# Patient Record
Sex: MALE | Race: WHITE | NOT HISPANIC OR LATINO | Employment: OTHER | URBAN - METROPOLITAN AREA
[De-identification: names, ages, dates, MRNs, and addresses within clinical notes are randomized per-mention and may not be internally consistent; named-entity substitution may affect disease eponyms.]

---

## 2019-03-05 ENCOUNTER — HOSPITAL ENCOUNTER (OUTPATIENT)
Dept: LAB | Facility: HOSPITAL | Age: 84
Discharge: HOME | End: 2019-03-05
Attending: INTERNAL MEDICINE

## 2019-03-05 LAB
ANION GAP SERPL CALC-SCNC: 21 MMOL/L (ref 8–19)
BUN SERPL-MCNC: 30 MG/DL (ref 5–25)
BUN/CREAT SERPL: 17 {RATIO} (ref 6–20)
CALCIUM SERPL-MCNC: 8.9 MG/DL (ref 8.7–10.4)
CHLORIDE SERPL-SCNC: 99 MMOL/L (ref 99–111)
CONV CO2: 21 MMOL/L (ref 22–32)
CREAT UR-MCNC: 1.77 MG/DL (ref 0.7–1.2)
EST. AVERAGE GLUCOSE BLD GHB EST-MCNC: 209 MG/DL
GFR SERPLBLD BASED ON 1.73 SQ M-ARVRAT: 34 ML/MIN/{1.73_M2}
GLUCOSE SERPL-MCNC: 332 MG/DL (ref 70–99)
HBA1C MFR BLD: 8.9 % (ref 3.5–5.7)
OSMOLALITY SERPL CALC.SUM OF ELEC: 301 MOSM/KG (ref 273–304)
POTASSIUM SERPL-SCNC: 4.5 MMOL/L (ref 3.5–5.3)
SODIUM SERPL-SCNC: 136 MMOL/L (ref 135–147)

## 2019-04-15 ENCOUNTER — HOSPITAL ENCOUNTER (OUTPATIENT)
Dept: LAB | Facility: HOSPITAL | Age: 84
Discharge: HOME | End: 2019-04-15
Attending: INTERNAL MEDICINE

## 2019-04-15 LAB
ANION GAP SERPL CALC-SCNC: 22 MMOL/L (ref 8–19)
BUN SERPL-MCNC: 27 MG/DL (ref 5–25)
BUN/CREAT SERPL: 15 {RATIO} (ref 6–20)
CALCIUM SERPL-MCNC: 9.1 MG/DL (ref 8.7–10.4)
CHLORIDE SERPL-SCNC: 100 MMOL/L (ref 99–111)
CONV CO2: 21 MMOL/L (ref 22–32)
CREAT UR-MCNC: 1.77 MG/DL (ref 0.7–1.2)
EST. AVERAGE GLUCOSE BLD GHB EST-MCNC: 200 MG/DL
GFR SERPLBLD BASED ON 1.73 SQ M-ARVRAT: 34 ML/MIN/{1.73_M2}
GLUCOSE SERPL-MCNC: 301 MG/DL (ref 70–99)
HBA1C MFR BLD: 8.6 % (ref 3.5–5.7)
OSMOLALITY SERPL CALC.SUM OF ELEC: 302 MOSM/KG (ref 273–304)
POTASSIUM SERPL-SCNC: 4.8 MMOL/L (ref 3.5–5.3)
SODIUM SERPL-SCNC: 138 MMOL/L (ref 135–147)

## 2019-04-16 LAB — FRUCTOSAMINE SERPL-SCNC: 270 UMOL/L (ref 0–285)

## 2019-05-06 ENCOUNTER — HOSPITAL ENCOUNTER (OUTPATIENT)
Dept: LAB | Facility: HOSPITAL | Age: 84
Discharge: HOME | End: 2019-05-06
Attending: INTERNAL MEDICINE

## 2019-05-06 LAB
ALBUMIN SERPL-MCNC: 4 G/DL (ref 3.5–5)
ALBUMIN/GLOB SERPL: 1.2 {RATIO} (ref 1.4–2.6)
ALP SERPL-CCNC: 76 U/L (ref 56–155)
ALT SERPL-CCNC: 17 U/L (ref 10–40)
ANION GAP SERPL CALC-SCNC: 23 MMOL/L (ref 8–19)
AST SERPL-CCNC: 40 U/L (ref 15–50)
BILIRUB SERPL-MCNC: 0.52 MG/DL (ref 0.2–1.3)
BUN SERPL-MCNC: 26 MG/DL (ref 5–25)
BUN/CREAT SERPL: 15 {RATIO} (ref 6–20)
CALCIUM SERPL-MCNC: 8.9 MG/DL (ref 8.7–10.4)
CHLORIDE SERPL-SCNC: 105 MMOL/L (ref 99–111)
CHOLEST SERPL-MCNC: 143 MG/DL (ref 107–200)
CHOLEST/HDLC SERPL: 3.3 {RATIO} (ref 3–6)
CONV CO2: 18 MMOL/L (ref 22–32)
CONV TOTAL PROTEIN: 7.3 G/DL (ref 6.3–8.2)
CREAT UR-MCNC: 1.68 MG/DL (ref 0.7–1.2)
GFR SERPLBLD BASED ON 1.73 SQ M-ARVRAT: 37 ML/MIN/{1.73_M2}
GLOBULIN UR ELPH-MCNC: 3.3 G/DL (ref 2–3.5)
GLUCOSE SERPL-MCNC: 201 MG/DL (ref 70–99)
HDLC SERPL-MCNC: 43 MG/DL (ref 40–60)
LDLC SERPL CALC-MCNC: 33 MG/DL (ref 70–100)
OSMOLALITY SERPL CALC.SUM OF ELEC: 302 MOSM/KG (ref 273–304)
POTASSIUM SERPL-SCNC: 4.6 MMOL/L (ref 3.5–5.3)
SODIUM SERPL-SCNC: 141 MMOL/L (ref 135–147)
TRIGL SERPL-MCNC: 335 MG/DL (ref 40–150)
VLDLC SERPL-MCNC: 67 MG/DL (ref 5–37)

## 2019-06-03 ENCOUNTER — HOSPITAL ENCOUNTER (OUTPATIENT)
Dept: LAB | Facility: HOSPITAL | Age: 84
Discharge: HOME | End: 2019-06-03
Attending: INTERNAL MEDICINE

## 2019-06-03 LAB
25(OH)D3 SERPL-MCNC: 26.7 NG/ML (ref 30–100)
ALBUMIN SERPL-MCNC: 3.9 G/DL (ref 3.5–5)
ANION GAP SERPL CALC-SCNC: 21 MMOL/L (ref 8–19)
ANION GAP SERPL CALC-SCNC: 24 MMOL/L (ref 8–19)
BASOPHILS # BLD AUTO: 0.07 10*3/UL (ref 0–0.2)
BASOPHILS NFR BLD AUTO: 0.9 % (ref 0–3)
BUN SERPL-MCNC: 27 MG/DL (ref 5–25)
BUN SERPL-MCNC: 27 MG/DL (ref 5–25)
BUN/CREAT SERPL: 14 {RATIO} (ref 6–20)
BUN/CREAT SERPL: 15 {RATIO} (ref 6–20)
CALCIUM SERPL-MCNC: 8.5 MG/DL (ref 8.7–10.4)
CALCIUM SERPL-MCNC: 8.6 MG/DL (ref 8.7–10.4)
CHLORIDE SERPL-SCNC: 104 MMOL/L (ref 99–111)
CHLORIDE SERPL-SCNC: 106 MMOL/L (ref 99–111)
CONV ABS IMM GRAN: 0.02 10*3/UL (ref 0–0.2)
CONV CO2: 16 MMOL/L (ref 22–32)
CONV CO2: 18 MMOL/L (ref 22–32)
CONV IMMATURE GRAN: 0.3 % (ref 0–1.8)
CREAT UR-MCNC: 1.85 MG/DL (ref 0.7–1.2)
CREAT UR-MCNC: 1.88 MG/DL (ref 0.7–1.2)
DEPRECATED RDW RBC AUTO: 52.1 FL (ref 35.1–43.9)
EOSINOPHIL # BLD AUTO: 0.35 10*3/UL (ref 0–0.7)
EOSINOPHIL # BLD AUTO: 4.7 % (ref 0–7)
ERYTHROCYTE [DISTWIDTH] IN BLOOD BY AUTOMATED COUNT: 15.4 % (ref 11.6–14.4)
EST. AVERAGE GLUCOSE BLD GHB EST-MCNC: 180 MG/DL
GFR SERPLBLD BASED ON 1.73 SQ M-ARVRAT: 32 ML/MIN/{1.73_M2}
GFR SERPLBLD BASED ON 1.73 SQ M-ARVRAT: 33 ML/MIN/{1.73_M2}
GLUCOSE SERPL-MCNC: 304 MG/DL (ref 70–99)
GLUCOSE SERPL-MCNC: 307 MG/DL (ref 70–99)
HBA1C MFR BLD: 11.6 G/DL (ref 14–18)
HBA1C MFR BLD: 7.9 % (ref 3.5–5.7)
HCT VFR BLD AUTO: 37.7 % (ref 42–52)
LYMPHOCYTES # BLD AUTO: 2.56 10*3/UL (ref 1–5)
MCH RBC QN AUTO: 28.4 PG (ref 27–31)
MCHC RBC AUTO-ENTMCNC: 30.8 G/DL (ref 33–37)
MCV RBC AUTO: 92.2 FL (ref 80–96)
MONOCYTES # BLD AUTO: 0.57 10*3/UL (ref 0.2–1.2)
MONOCYTES NFR BLD AUTO: 7.6 % (ref 3–10)
NEUTROPHILS # BLD AUTO: 3.91 10*3/UL (ref 2–8)
NEUTROPHILS NFR BLD AUTO: 52.3 % (ref 30–85)
NRBC CBCN: 0 % (ref 0–0.7)
OSMOLALITY SERPL CALC.SUM OF ELEC: 303 MOSM/KG (ref 273–304)
PHOSPHATE SERPL-MCNC: 3.1 MG/DL (ref 2.4–4.5)
PLATELET # BLD AUTO: 234 10*3/UL (ref 130–400)
PMV BLD AUTO: 9.8 FL (ref 9.4–12.4)
POTASSIUM SERPL-SCNC: 5 MMOL/L (ref 3.5–5.3)
POTASSIUM SERPL-SCNC: 5.1 MMOL/L (ref 3.5–5.3)
RBC # BLD AUTO: 4.09 10*6/UL (ref 4.7–6.1)
SODIUM SERPL-SCNC: 138 MMOL/L (ref 135–147)
SODIUM SERPL-SCNC: 141 MMOL/L (ref 135–147)
VARIANT LYMPHS NFR BLD MANUAL: 34.2 % (ref 20–45)
WBC # BLD AUTO: 7.48 10*3/UL (ref 4.8–10.8)

## 2019-06-04 LAB — FRUCTOSAMINE SERPL-SCNC: 251 UMOL/L (ref 0–285)

## 2019-09-10 ENCOUNTER — HOSPITAL ENCOUNTER (OUTPATIENT)
Dept: LAB | Facility: HOSPITAL | Age: 84
Discharge: HOME | End: 2019-09-10
Attending: INTERNAL MEDICINE

## 2019-09-10 LAB
ANION GAP SERPL CALC-SCNC: 28 MMOL/L (ref 8–19)
BASOPHILS # BLD AUTO: 0.05 10*3/UL (ref 0–0.2)
BASOPHILS NFR BLD AUTO: 0.6 % (ref 0–3)
BUN SERPL-MCNC: 28 MG/DL (ref 5–25)
BUN/CREAT SERPL: 16 {RATIO} (ref 6–20)
CALCIUM SERPL-MCNC: 9.6 MG/DL (ref 8.7–10.4)
CHLORIDE SERPL-SCNC: 110 MMOL/L (ref 99–111)
CHOLEST SERPL-MCNC: 146 MG/DL (ref 107–200)
CHOLEST/HDLC SERPL: 3.6 {RATIO} (ref 3–6)
CONV ABS IMM GRAN: 0.04 10*3/UL (ref 0–0.2)
CONV CO2: 15 MMOL/L (ref 22–32)
CONV IMMATURE GRAN: 0.5 % (ref 0–1.8)
CREAT UR-MCNC: 1.76 MG/DL (ref 0.7–1.2)
DEPRECATED RDW RBC AUTO: 47.5 FL (ref 35.1–43.9)
EOSINOPHIL # BLD AUTO: 0.43 10*3/UL (ref 0–0.7)
EOSINOPHIL # BLD AUTO: 5.5 % (ref 0–7)
ERYTHROCYTE [DISTWIDTH] IN BLOOD BY AUTOMATED COUNT: 14.5 % (ref 11.6–14.4)
EST. AVERAGE GLUCOSE BLD GHB EST-MCNC: 186 MG/DL
FERRITIN SERPL-MCNC: 168 NG/ML (ref 30–300)
GFR SERPLBLD BASED ON 1.73 SQ M-ARVRAT: 35 ML/MIN/{1.73_M2}
GLUCOSE SERPL-MCNC: 169 MG/DL (ref 70–99)
HBA1C MFR BLD: 8.1 % (ref 3.5–5.7)
HCT VFR BLD AUTO: 39.3 % (ref 42–52)
HDLC SERPL-MCNC: 41 MG/DL (ref 40–60)
HGB BLD-MCNC: 12.5 G/DL (ref 14–18)
IRON SATN MFR SERPL: 19 % (ref 20–55)
IRON SERPL-MCNC: 77 UG/DL (ref 70–180)
LDLC SERPL CALC-MCNC: 38 MG/DL (ref 70–100)
LYMPHOCYTES # BLD AUTO: 3.1 10*3/UL (ref 1–5)
LYMPHOCYTES NFR BLD AUTO: 39.4 % (ref 20–45)
MCH RBC QN AUTO: 28.8 PG (ref 27–31)
MCHC RBC AUTO-ENTMCNC: 31.8 G/DL (ref 33–37)
MCV RBC AUTO: 90.6 FL (ref 80–96)
MONOCYTES # BLD AUTO: 0.6 10*3/UL (ref 0.2–1.2)
MONOCYTES NFR BLD AUTO: 7.6 % (ref 3–10)
NEUTROPHILS # BLD AUTO: 3.65 10*3/UL (ref 2–8)
NEUTROPHILS NFR BLD AUTO: 46.4 % (ref 30–85)
NRBC CBCN: 0 % (ref 0–0.7)
OSMOLALITY SERPL CALC.SUM OF ELEC: 315 MOSM/KG (ref 273–304)
PLATELET # BLD AUTO: 246 10*3/UL (ref 130–400)
PMV BLD AUTO: 9.4 FL (ref 9.4–12.4)
POTASSIUM SERPL-SCNC: 4.8 MMOL/L (ref 3.5–5.3)
RBC # BLD AUTO: 4.34 10*6/UL (ref 4.7–6.1)
SODIUM SERPL-SCNC: 148 MMOL/L (ref 135–147)
TIBC SERPL-MCNC: 399 UG/DL (ref 245–450)
TRANSFERRIN SERPL-MCNC: 279 MG/DL (ref 215–365)
TRIGL SERPL-MCNC: 335 MG/DL (ref 40–150)
VLDLC SERPL-MCNC: 67 MG/DL (ref 5–37)
WBC # BLD AUTO: 7.87 10*3/UL (ref 4.8–10.8)

## 2019-12-03 ENCOUNTER — HOSPITAL ENCOUNTER (OUTPATIENT)
Dept: LAB | Facility: HOSPITAL | Age: 84
Discharge: HOME | End: 2019-12-03
Attending: INTERNAL MEDICINE

## 2019-12-03 LAB
25(OH)D3 SERPL-MCNC: 28.4 NG/ML (ref 30–100)
ALBUMIN SERPL-MCNC: 3.7 G/DL (ref 3.5–5)
ANION GAP SERPL CALC-SCNC: 18 MMOL/L (ref 8–19)
ANION GAP SERPL CALC-SCNC: 21 MMOL/L (ref 8–19)
APPEARANCE UR: CLEAR
BASOPHILS # BLD AUTO: 0.05 10*3/UL (ref 0–0.2)
BASOPHILS NFR BLD AUTO: 0.6 % (ref 0–3)
BILIRUB UR QL: NEGATIVE
BUN SERPL-MCNC: 28 MG/DL (ref 5–25)
BUN SERPL-MCNC: 28 MG/DL (ref 5–25)
BUN/CREAT SERPL: 15 {RATIO} (ref 6–20)
BUN/CREAT SERPL: 17 {RATIO} (ref 6–20)
CALCIUM SERPL-MCNC: 9.1 MG/DL (ref 8.7–10.4)
CALCIUM SERPL-MCNC: 9.4 MG/DL (ref 8.7–10.4)
CHLORIDE SERPL-SCNC: 102 MMOL/L (ref 99–111)
CHLORIDE SERPL-SCNC: 102 MMOL/L (ref 99–111)
COLOR UR: YELLOW
CONV ABS IMM GRAN: 0.04 10*3/UL (ref 0–0.2)
CONV CO2: 21 MMOL/L (ref 22–32)
CONV CO2: 21 MMOL/L (ref 22–32)
CONV COLLECTION SOURCE (UA): ABNORMAL
CONV CREATININE URINE, RANDOM: 247.3 MG/DL (ref 10–300)
CONV IMMATURE GRAN: 0.5 % (ref 0–1.8)
CONV UROBILINOGEN IN URINE BY AUTOMATED TEST STRIP: 0.2 {EHRLICHU}/DL (ref 0.1–1)
CREAT UR-MCNC: 1.64 MG/DL (ref 0.7–1.2)
CREAT UR-MCNC: 1.85 MG/DL (ref 0.7–1.2)
DEPRECATED RDW RBC AUTO: 47.2 FL (ref 35.1–43.9)
EOSINOPHIL # BLD AUTO: 0.4 10*3/UL (ref 0–0.7)
EOSINOPHIL # BLD AUTO: 4.8 % (ref 0–7)
ERYTHROCYTE [DISTWIDTH] IN BLOOD BY AUTOMATED COUNT: 14.5 % (ref 11.6–14.4)
EST. AVERAGE GLUCOSE BLD GHB EST-MCNC: 212 MG/DL
GFR SERPLBLD BASED ON 1.73 SQ M-ARVRAT: 33 ML/MIN/{1.73_M2}
GFR SERPLBLD BASED ON 1.73 SQ M-ARVRAT: 38 ML/MIN/{1.73_M2}
GLUCOSE SERPL-MCNC: 241 MG/DL (ref 70–99)
GLUCOSE SERPL-MCNC: 243 MG/DL (ref 70–99)
GLUCOSE UR QL: 100 MG/DL
HBA1C MFR BLD: 9 % (ref 3.5–5.7)
HCT VFR BLD AUTO: 38.7 % (ref 42–52)
HGB BLD-MCNC: 12.2 G/DL (ref 14–18)
HGB UR QL STRIP: NEGATIVE
KETONES UR QL STRIP: ABNORMAL MG/DL
LEUKOCYTE ESTERASE UR QL STRIP: NEGATIVE
LYMPHOCYTES # BLD AUTO: 2.11 10*3/UL (ref 1–5)
LYMPHOCYTES NFR BLD AUTO: 25.5 % (ref 20–45)
MCH RBC QN AUTO: 28.3 PG (ref 27–31)
MCHC RBC AUTO-ENTMCNC: 31.5 G/DL (ref 33–37)
MCV RBC AUTO: 89.8 FL (ref 80–96)
MONOCYTES # BLD AUTO: 0.54 10*3/UL (ref 0.2–1.2)
MONOCYTES NFR BLD AUTO: 6.5 % (ref 3–10)
NEUTROPHILS # BLD AUTO: 5.15 10*3/UL (ref 2–8)
NEUTROPHILS NFR BLD AUTO: 62.1 % (ref 30–85)
NITRITE UR QL STRIP: NEGATIVE
NRBC CBCN: 0 % (ref 0–0.7)
OSMOLALITY SERPL CALC.SUM OF ELEC: 297 MOSM/KG (ref 273–304)
PH UR STRIP.AUTO: 5 [PH] (ref 5–8)
PHOSPHATE SERPL-MCNC: 3.7 MG/DL (ref 2.4–4.5)
PLATELET # BLD AUTO: 242 10*3/UL (ref 130–400)
PMV BLD AUTO: 9.5 FL (ref 9.4–12.4)
POTASSIUM SERPL-SCNC: 4.4 MMOL/L (ref 3.5–5.3)
POTASSIUM SERPL-SCNC: 4.5 MMOL/L (ref 3.5–5.3)
PROT UR QL: ABNORMAL MG/DL
PROT UR-MCNC: 21.6 MG/DL
PTH-INTACT SERPL-MCNC: 60 PG/ML (ref 11.1–79.5)
RBC # BLD AUTO: 4.31 10*6/UL (ref 4.7–6.1)
SODIUM SERPL-SCNC: 137 MMOL/L (ref 135–147)
SODIUM SERPL-SCNC: 139 MMOL/L (ref 135–147)
SP GR UR: 1.02 (ref 1–1.03)
WBC # BLD AUTO: 8.29 10*3/UL (ref 4.8–10.8)

## 2019-12-12 ENCOUNTER — HOSPITAL ENCOUNTER (OUTPATIENT)
Dept: GENERAL RADIOLOGY | Facility: HOSPITAL | Age: 84
Discharge: HOME | End: 2019-12-12
Attending: INTERNAL MEDICINE

## 2020-01-06 ENCOUNTER — HOSPITAL ENCOUNTER (OUTPATIENT)
Dept: LAB | Facility: HOSPITAL | Age: 85
Discharge: HOME | End: 2020-01-06
Attending: INTERNAL MEDICINE

## 2020-01-06 LAB
ALBUMIN SERPL-MCNC: 4 G/DL (ref 3.5–5)
ALBUMIN/GLOB SERPL: 1.2 {RATIO} (ref 1.4–2.6)
ALP SERPL-CCNC: 82 U/L (ref 56–155)
ALT SERPL-CCNC: 13 U/L (ref 10–40)
ANION GAP SERPL CALC-SCNC: 19 MMOL/L (ref 8–19)
AST SERPL-CCNC: 23 U/L (ref 15–50)
BILIRUB SERPL-MCNC: 0.33 MG/DL (ref 0.2–1.3)
BUN SERPL-MCNC: 25 MG/DL (ref 5–25)
BUN/CREAT SERPL: 17 {RATIO} (ref 6–20)
CALCIUM SERPL-MCNC: 9.2 MG/DL (ref 8.7–10.4)
CHLORIDE SERPL-SCNC: 109 MMOL/L (ref 99–111)
CHOLEST SERPL-MCNC: 153 MG/DL (ref 107–200)
CHOLEST/HDLC SERPL: 3.4 {RATIO} (ref 3–6)
CONV CO2: 17 MMOL/L (ref 22–32)
CONV TOTAL PROTEIN: 7.3 G/DL (ref 6.3–8.2)
CREAT UR-MCNC: 1.51 MG/DL (ref 0.7–1.2)
GFR SERPLBLD BASED ON 1.73 SQ M-ARVRAT: 42 ML/MIN/{1.73_M2}
GLOBULIN UR ELPH-MCNC: 3.3 G/DL (ref 2–3.5)
GLUCOSE SERPL-MCNC: 85 MG/DL (ref 70–99)
HDLC SERPL-MCNC: 45 MG/DL (ref 40–60)
LDLC SERPL CALC-MCNC: 57 MG/DL (ref 70–100)
OSMOLALITY SERPL CALC.SUM OF ELEC: 294 MOSM/KG (ref 273–304)
POTASSIUM SERPL-SCNC: 5.1 MMOL/L (ref 3.5–5.3)
SODIUM SERPL-SCNC: 140 MMOL/L (ref 135–147)
TRIGL SERPL-MCNC: 253 MG/DL (ref 40–150)
VLDLC SERPL-MCNC: 51 MG/DL (ref 5–37)

## 2020-04-27 ENCOUNTER — HOSPITAL ENCOUNTER (OUTPATIENT)
Dept: LAB | Facility: HOSPITAL | Age: 85
Discharge: HOME | End: 2020-04-27
Attending: INTERNAL MEDICINE

## 2020-04-27 LAB
ANION GAP SERPL CALC-SCNC: 23 MMOL/L (ref 8–19)
BUN SERPL-MCNC: 29 MG/DL (ref 5–25)
BUN/CREAT SERPL: 17 {RATIO} (ref 6–20)
CALCIUM SERPL-MCNC: 9.1 MG/DL (ref 8.7–10.4)
CHLORIDE SERPL-SCNC: 104 MMOL/L (ref 99–111)
CONV CO2: 18 MMOL/L (ref 22–32)
CREAT UR-MCNC: 1.73 MG/DL (ref 0.7–1.2)
EST. AVERAGE GLUCOSE BLD GHB EST-MCNC: 212 MG/DL
GFR SERPLBLD BASED ON 1.73 SQ M-ARVRAT: 35 ML/MIN/{1.73_M2}
GLUCOSE SERPL-MCNC: 279 MG/DL (ref 70–99)
HBA1C MFR BLD: 9 % (ref 3.5–5.7)
OSMOLALITY SERPL CALC.SUM OF ELEC: 302 MOSM/KG (ref 273–304)
POTASSIUM SERPL-SCNC: 6.5 MMOL/L (ref 3.5–5.3)
SODIUM SERPL-SCNC: 138 MMOL/L (ref 135–147)

## 2020-04-28 ENCOUNTER — HOSPITAL ENCOUNTER (OUTPATIENT)
Dept: LAB | Facility: HOSPITAL | Age: 85
Discharge: HOME | End: 2020-04-28
Attending: INTERNAL MEDICINE

## 2020-04-28 LAB
ANION GAP SERPL CALC-SCNC: 19 MMOL/L (ref 8–19)
BUN SERPL-MCNC: 34 MG/DL (ref 5–25)
BUN/CREAT SERPL: 17 {RATIO} (ref 6–20)
CALCIUM SERPL-MCNC: 9.4 MG/DL (ref 8.7–10.4)
CHLORIDE SERPL-SCNC: 104 MMOL/L (ref 99–111)
CONV CO2: 21 MMOL/L (ref 22–32)
CREAT UR-MCNC: 2.04 MG/DL (ref 0.7–1.2)
GFR SERPLBLD BASED ON 1.73 SQ M-ARVRAT: 29 ML/MIN/{1.73_M2}
GLUCOSE SERPL-MCNC: 208 MG/DL (ref 70–99)
OSMOLALITY SERPL CALC.SUM OF ELEC: 300 MOSM/KG (ref 273–304)
POTASSIUM SERPL-SCNC: 6.2 MMOL/L (ref 3.5–5.3)
SODIUM SERPL-SCNC: 138 MMOL/L (ref 135–147)

## 2020-05-13 ENCOUNTER — HOSPITAL ENCOUNTER (OUTPATIENT)
Dept: LAB | Facility: HOSPITAL | Age: 85
Discharge: HOME | End: 2020-05-13
Attending: INTERNAL MEDICINE

## 2020-05-13 LAB
ANION GAP SERPL CALC-SCNC: 28 MMOL/L (ref 8–19)
BUN SERPL-MCNC: 38 MG/DL (ref 5–25)
BUN/CREAT SERPL: 17 {RATIO} (ref 6–20)
CALCIUM SERPL-MCNC: 9.4 MG/DL (ref 8.7–10.4)
CHLORIDE SERPL-SCNC: 103 MMOL/L (ref 99–111)
CONV CO2: 19 MMOL/L (ref 22–32)
CREAT UR-MCNC: 2.26 MG/DL (ref 0.7–1.2)
GFR SERPLBLD BASED ON 1.73 SQ M-ARVRAT: 25 ML/MIN/{1.73_M2}
GLUCOSE SERPL-MCNC: 407 MG/DL (ref 70–99)
OSMOLALITY SERPL CALC.SUM OF ELEC: 326 MOSM/KG (ref 273–304)
POTASSIUM SERPL-SCNC: 4.7 MMOL/L (ref 3.5–5.3)
SODIUM SERPL-SCNC: 145 MMOL/L (ref 135–147)

## 2020-06-05 ENCOUNTER — HOSPITAL ENCOUNTER (OUTPATIENT)
Dept: LAB | Facility: HOSPITAL | Age: 85
Discharge: HOME | End: 2020-06-05
Attending: INTERNAL MEDICINE

## 2020-06-05 LAB
25(OH)D3 SERPL-MCNC: 28.1 NG/ML (ref 30–100)
ALBUMIN SERPL-MCNC: 3.8 G/DL (ref 3.5–5)
ANION GAP SERPL CALC-SCNC: 21 MMOL/L (ref 8–19)
APPEARANCE UR: CLEAR
BASOPHILS # BLD AUTO: 0.06 10*3/UL (ref 0–0.2)
BASOPHILS NFR BLD AUTO: 0.8 % (ref 0–3)
BILIRUB UR QL: NEGATIVE
BUN SERPL-MCNC: 26 MG/DL (ref 5–25)
BUN/CREAT SERPL: 13 {RATIO} (ref 6–20)
CALCIUM SERPL-MCNC: 9.2 MG/DL (ref 8.7–10.4)
CHLORIDE SERPL-SCNC: 100 MMOL/L (ref 99–111)
COLOR UR: YELLOW
CONV ABS IMM GRAN: 0.03 10*3/UL (ref 0–0.2)
CONV CO2: 20 MMOL/L (ref 22–32)
CONV COLLECTION SOURCE (UA): ABNORMAL
CONV CREATININE URINE, RANDOM: 59.8 MG/DL (ref 10–300)
CONV IMMATURE GRAN: 0.4 % (ref 0–1.8)
CONV UROBILINOGEN IN URINE BY AUTOMATED TEST STRIP: 0.2 {EHRLICHU}/DL (ref 0.1–1)
CREAT UR-MCNC: 1.93 MG/DL (ref 0.7–1.2)
DEPRECATED RDW RBC AUTO: 48.2 FL (ref 35.1–43.9)
EOSINOPHIL # BLD AUTO: 0.35 10*3/UL (ref 0–0.7)
EOSINOPHIL # BLD AUTO: 4.4 % (ref 0–7)
ERYTHROCYTE [DISTWIDTH] IN BLOOD BY AUTOMATED COUNT: 14.2 % (ref 11.6–14.4)
GFR SERPLBLD BASED ON 1.73 SQ M-ARVRAT: 31 ML/MIN/{1.73_M2}
GLUCOSE SERPL-MCNC: 345 MG/DL (ref 70–99)
GLUCOSE UR QL: >=1000 MG/DL
HCT VFR BLD AUTO: 39.1 % (ref 42–52)
HGB BLD-MCNC: 12.1 G/DL (ref 14–18)
HGB UR QL STRIP: NEGATIVE
KETONES UR QL STRIP: NEGATIVE MG/DL
LEUKOCYTE ESTERASE UR QL STRIP: NEGATIVE
LYMPHOCYTES # BLD AUTO: 2.72 10*3/UL (ref 1–5)
LYMPHOCYTES NFR BLD AUTO: 34 % (ref 20–45)
MCH RBC QN AUTO: 28.9 PG (ref 27–31)
MCHC RBC AUTO-ENTMCNC: 30.9 G/DL (ref 33–37)
MCV RBC AUTO: 93.3 FL (ref 80–96)
MONOCYTES # BLD AUTO: 0.61 10*3/UL (ref 0.2–1.2)
MONOCYTES NFR BLD AUTO: 7.6 % (ref 3–10)
NEUTROPHILS # BLD AUTO: 4.22 10*3/UL (ref 2–8)
NEUTROPHILS NFR BLD AUTO: 52.8 % (ref 30–85)
NITRITE UR QL STRIP: NEGATIVE
NRBC CBCN: 0 % (ref 0–0.7)
PH UR STRIP.AUTO: 5 [PH] (ref 5–8)
PHOSPHATE SERPL-MCNC: 3.8 MG/DL (ref 2.4–4.5)
PLATELET # BLD AUTO: 222 10*3/UL (ref 130–400)
PMV BLD AUTO: 10.1 FL (ref 9.4–12.4)
POTASSIUM SERPL-SCNC: 4.4 MMOL/L (ref 3.5–5.3)
PROT UR QL: NEGATIVE MG/DL
PROT UR-MCNC: 8.3 MG/DL
PTH-INTACT SERPL-MCNC: 71.6 PG/ML (ref 11.1–79.5)
RBC # BLD AUTO: 4.19 10*6/UL (ref 4.7–6.1)
SODIUM SERPL-SCNC: 137 MMOL/L (ref 135–147)
SP GR UR: 1.01 (ref 1–1.03)
WBC # BLD AUTO: 7.99 10*3/UL (ref 4.8–10.8)

## 2020-06-15 ENCOUNTER — HOSPITAL ENCOUNTER (OUTPATIENT)
Dept: LAB | Facility: HOSPITAL | Age: 85
Discharge: HOME | End: 2020-06-15
Attending: INTERNAL MEDICINE

## 2020-06-15 LAB
ALBUMIN SERPL-MCNC: 4 G/DL (ref 3.5–5)
ALBUMIN/GLOB SERPL: 1.3 {RATIO} (ref 1.4–2.6)
ALP SERPL-CCNC: 82 U/L (ref 56–155)
ALT SERPL-CCNC: 16 U/L (ref 10–40)
ANION GAP SERPL CALC-SCNC: 26 MMOL/L (ref 8–19)
AST SERPL-CCNC: 31 U/L (ref 15–50)
BASOPHILS # BLD AUTO: 0.06 10*3/UL (ref 0–0.2)
BASOPHILS NFR BLD AUTO: 0.9 % (ref 0–3)
BILIRUB SERPL-MCNC: 0.42 MG/DL (ref 0.2–1.3)
BUN SERPL-MCNC: 25 MG/DL (ref 5–25)
BUN/CREAT SERPL: 14 {RATIO} (ref 6–20)
CALCIUM SERPL-MCNC: 9.5 MG/DL (ref 8.7–10.4)
CHLORIDE SERPL-SCNC: 103 MMOL/L (ref 99–111)
CHOLEST SERPL-MCNC: 216 MG/DL (ref 107–200)
CHOLEST/HDLC SERPL: 5.5 {RATIO} (ref 3–6)
CONV ABS IMM GRAN: 0.02 10*3/UL (ref 0–0.2)
CONV CO2: 17 MMOL/L (ref 22–32)
CONV IMMATURE GRAN: 0.3 % (ref 0–1.8)
CONV TOTAL PROTEIN: 7 G/DL (ref 6.3–8.2)
CREAT UR-MCNC: 1.78 MG/DL (ref 0.7–1.2)
DEPRECATED RDW RBC AUTO: 48.6 FL (ref 35.1–43.9)
EOSINOPHIL # BLD AUTO: 0.4 10*3/UL (ref 0–0.7)
EOSINOPHIL # BLD AUTO: 6 % (ref 0–7)
ERYTHROCYTE [DISTWIDTH] IN BLOOD BY AUTOMATED COUNT: 14.2 % (ref 11.6–14.4)
EST. AVERAGE GLUCOSE BLD GHB EST-MCNC: 246 MG/DL
GFR SERPLBLD BASED ON 1.73 SQ M-ARVRAT: 34 ML/MIN/{1.73_M2}
GLOBULIN UR ELPH-MCNC: 3 G/DL (ref 2–3.5)
GLUCOSE SERPL-MCNC: 219 MG/DL (ref 70–99)
HBA1C MFR BLD: 10.2 % (ref 3.5–5.7)
HCT VFR BLD AUTO: 40.4 % (ref 42–52)
HDLC SERPL-MCNC: 39 MG/DL (ref 40–60)
HGB BLD-MCNC: 12.9 G/DL (ref 14–18)
LDLC SERPL CALC-MCNC: 117 MG/DL (ref 70–100)
LYMPHOCYTES # BLD AUTO: 2.58 10*3/UL (ref 1–5)
LYMPHOCYTES NFR BLD AUTO: 38.4 % (ref 20–45)
MCH RBC QN AUTO: 29.8 PG (ref 27–31)
MCHC RBC AUTO-ENTMCNC: 31.9 G/DL (ref 33–37)
MCV RBC AUTO: 93.3 FL (ref 80–96)
MONOCYTES # BLD AUTO: 0.44 10*3/UL (ref 0.2–1.2)
MONOCYTES NFR BLD AUTO: 6.5 % (ref 3–10)
NEUTROPHILS # BLD AUTO: 3.22 10*3/UL (ref 2–8)
NEUTROPHILS NFR BLD AUTO: 47.9 % (ref 30–85)
NRBC CBCN: 0 % (ref 0–0.7)
OSMOLALITY SERPL CALC.SUM OF ELEC: 305 MOSM/KG (ref 273–304)
PLATELET # BLD AUTO: 236 10*3/UL (ref 130–400)
PMV BLD AUTO: 9.9 FL (ref 9.4–12.4)
POTASSIUM SERPL-SCNC: 4.3 MMOL/L (ref 3.5–5.3)
RBC # BLD AUTO: 4.33 10*6/UL (ref 4.7–6.1)
SODIUM SERPL-SCNC: 142 MMOL/L (ref 135–147)
TRIGL SERPL-MCNC: 457 MG/DL (ref 40–150)
WBC # BLD AUTO: 6.72 10*3/UL (ref 4.8–10.8)

## 2020-09-24 ENCOUNTER — HOSPITAL ENCOUNTER (OUTPATIENT)
Dept: OTHER | Facility: HOSPITAL | Age: 85
Discharge: HOME | End: 2020-09-24
Attending: INTERNAL MEDICINE

## 2020-09-24 LAB
ANION GAP SERPL CALC-SCNC: 18 MMOL/L (ref 8–19)
BASOPHILS # BLD AUTO: 0.05 10*3/UL (ref 0–0.2)
BASOPHILS NFR BLD AUTO: 0.7 % (ref 0–3)
BNP SERPL-MCNC: 3705 PG/ML (ref 0–1800)
BUN SERPL-MCNC: 28 MG/DL (ref 5–25)
BUN/CREAT SERPL: 19 {RATIO} (ref 6–20)
CALCIUM SERPL-MCNC: 8.6 MG/DL (ref 8.7–10.4)
CHLORIDE SERPL-SCNC: 102 MMOL/L (ref 99–111)
CONV ABS IMM GRAN: 0.02 10*3/UL (ref 0–0.2)
CONV CO2: 24 MMOL/L (ref 22–32)
CONV IMMATURE GRAN: 0.3 % (ref 0–1.8)
CREAT UR-MCNC: 1.5 MG/DL (ref 0.7–1.2)
DEPRECATED RDW RBC AUTO: 57.4 FL (ref 35.1–43.9)
EOSINOPHIL # BLD AUTO: 0.36 10*3/UL (ref 0–0.7)
EOSINOPHIL # BLD AUTO: 4.8 % (ref 0–7)
ERYTHROCYTE [DISTWIDTH] IN BLOOD BY AUTOMATED COUNT: 17.3 % (ref 11.6–14.4)
EST. AVERAGE GLUCOSE BLD GHB EST-MCNC: 166 MG/DL
FOLATE SERPL-MCNC: 9.3 NG/ML (ref 4.8–20)
GFR SERPLBLD BASED ON 1.73 SQ M-ARVRAT: 42 ML/MIN/{1.73_M2}
GLUCOSE SERPL-MCNC: 230 MG/DL (ref 70–99)
HBA1C MFR BLD: 7.4 % (ref 3.5–5.7)
HCT VFR BLD AUTO: 36.5 % (ref 42–52)
HGB BLD-MCNC: 10.8 G/DL (ref 14–18)
IRON SATN MFR SERPL: 15 % (ref 20–55)
IRON SERPL-MCNC: 36 UG/DL (ref 70–180)
LYMPHOCYTES # BLD AUTO: 2.22 10*3/UL (ref 1–5)
LYMPHOCYTES NFR BLD AUTO: 29.4 % (ref 20–45)
MCH RBC QN AUTO: 26.8 PG (ref 27–31)
MCHC RBC AUTO-ENTMCNC: 29.6 G/DL (ref 33–37)
MCV RBC AUTO: 90.6 FL (ref 80–96)
MONOCYTES # BLD AUTO: 0.57 10*3/UL (ref 0.2–1.2)
MONOCYTES NFR BLD AUTO: 7.5 % (ref 3–10)
NEUTROPHILS # BLD AUTO: 4.34 10*3/UL (ref 2–8)
NEUTROPHILS NFR BLD AUTO: 57.3 % (ref 30–85)
NRBC CBCN: 0 % (ref 0–0.7)
OSMOLALITY SERPL CALC.SUM OF ELEC: 303 MOSM/KG (ref 273–304)
PLATELET # BLD AUTO: 248 10*3/UL (ref 130–400)
PMV BLD AUTO: 9.4 FL (ref 9.4–12.4)
POTASSIUM SERPL-SCNC: 4.2 MMOL/L (ref 3.5–5.3)
RBC # BLD AUTO: 4.03 10*6/UL (ref 4.7–6.1)
SODIUM SERPL-SCNC: 140 MMOL/L (ref 135–147)
TIBC SERPL-MCNC: 243 UG/DL (ref 245–450)
TRANSFERRIN SERPL-MCNC: 170 MG/DL (ref 215–365)
VIT B12 SERPL-MCNC: >2000 PG/ML (ref 211–911)
WBC # BLD AUTO: 7.56 10*3/UL (ref 4.8–10.8)

## 2020-10-01 ENCOUNTER — HOSPITAL ENCOUNTER (OUTPATIENT)
Dept: OTHER | Facility: HOSPITAL | Age: 85
Discharge: HOME | End: 2020-10-01
Attending: INTERNAL MEDICINE

## 2020-10-01 LAB
ANION GAP SERPL CALC-SCNC: 17 MMOL/L (ref 8–19)
BASOPHILS # BLD AUTO: 0.04 10*3/UL (ref 0–0.2)
BASOPHILS NFR BLD AUTO: 0.7 % (ref 0–3)
BNP SERPL-MCNC: 3975 PG/ML (ref 0–1800)
BUN SERPL-MCNC: 21 MG/DL (ref 5–25)
BUN/CREAT SERPL: 14 {RATIO} (ref 6–20)
CALCIUM SERPL-MCNC: 9 MG/DL (ref 8.7–10.4)
CHLORIDE SERPL-SCNC: 100 MMOL/L (ref 99–111)
CONV ABS IMM GRAN: 0.02 10*3/UL (ref 0–0.2)
CONV CO2: 24 MMOL/L (ref 22–32)
CONV IMMATURE GRAN: 0.4 % (ref 0–1.8)
CREAT UR-MCNC: 1.48 MG/DL (ref 0.7–1.2)
DEPRECATED RDW RBC AUTO: 55.5 FL (ref 35.1–43.9)
EOSINOPHIL # BLD AUTO: 0.41 10*3/UL (ref 0–0.7)
EOSINOPHIL # BLD AUTO: 7.6 % (ref 0–7)
ERYTHROCYTE [DISTWIDTH] IN BLOOD BY AUTOMATED COUNT: 17.2 % (ref 11.6–14.4)
FERRITIN SERPL-MCNC: 481 NG/ML (ref 30–300)
GFR SERPLBLD BASED ON 1.73 SQ M-ARVRAT: 42 ML/MIN/{1.73_M2}
GLUCOSE SERPL-MCNC: 188 MG/DL (ref 70–99)
HCT VFR BLD AUTO: 36.6 % (ref 42–52)
HGB BLD-MCNC: 11.1 G/DL (ref 14–18)
IRON SATN MFR SERPL: 22 % (ref 20–55)
IRON SERPL-MCNC: 64 UG/DL (ref 70–180)
LYMPHOCYTES # BLD AUTO: 1.92 10*3/UL (ref 1–5)
LYMPHOCYTES NFR BLD AUTO: 35.5 % (ref 20–45)
MCH RBC QN AUTO: 26.7 PG (ref 27–31)
MCHC RBC AUTO-ENTMCNC: 30.3 G/DL (ref 33–37)
MCV RBC AUTO: 88.2 FL (ref 80–96)
MONOCYTES # BLD AUTO: 0.4 10*3/UL (ref 0.2–1.2)
MONOCYTES NFR BLD AUTO: 7.4 % (ref 3–10)
NEUTROPHILS # BLD AUTO: 2.62 10*3/UL (ref 2–8)
NEUTROPHILS NFR BLD AUTO: 48.4 % (ref 30–85)
NRBC CBCN: 0 % (ref 0–0.7)
OSMOLALITY SERPL CALC.SUM OF ELEC: 292 MOSM/KG (ref 273–304)
PLATELET # BLD AUTO: 243 10*3/UL (ref 130–400)
PMV BLD AUTO: 8.8 FL (ref 9.4–12.4)
POTASSIUM SERPL-SCNC: 4 MMOL/L (ref 3.5–5.3)
RBC # BLD AUTO: 4.15 10*6/UL (ref 4.7–6.1)
SODIUM SERPL-SCNC: 137 MMOL/L (ref 135–147)
TIBC SERPL-MCNC: 289 UG/DL (ref 245–450)
TRANSFERRIN SERPL-MCNC: 202 MG/DL (ref 215–365)
WBC # BLD AUTO: 5.41 10*3/UL (ref 4.8–10.8)

## 2020-11-13 ENCOUNTER — HOSPITAL ENCOUNTER (OUTPATIENT)
Dept: OTHER | Facility: HOSPITAL | Age: 85
Discharge: HOME | End: 2020-11-13
Attending: INTERNAL MEDICINE

## 2020-11-13 LAB
BASOPHILS # BLD AUTO: 0.05 10*3/UL (ref 0–0.2)
BASOPHILS NFR BLD AUTO: 1 % (ref 0–3)
CONV ABS IMM GRAN: 0.01 10*3/UL (ref 0–0.2)
CONV IMMATURE GRAN: 0.2 % (ref 0–1.8)
DEPRECATED RDW RBC AUTO: 56.1 FL (ref 35.1–43.9)
EOSINOPHIL # BLD AUTO: 0.35 10*3/UL (ref 0–0.7)
EOSINOPHIL # BLD AUTO: 7 % (ref 0–7)
ERYTHROCYTE [DISTWIDTH] IN BLOOD BY AUTOMATED COUNT: 17.3 % (ref 11.6–14.4)
EST. AVERAGE GLUCOSE BLD GHB EST-MCNC: 194 MG/DL
FOLATE SERPL-MCNC: 6.8 NG/ML (ref 4.8–20)
HBA1C MFR BLD: 8.4 % (ref 3.5–5.7)
HCT VFR BLD AUTO: 36.2 % (ref 42–52)
HGB BLD-MCNC: 11.1 G/DL (ref 14–18)
LYMPHOCYTES # BLD AUTO: 1.6 10*3/UL (ref 1–5)
LYMPHOCYTES NFR BLD AUTO: 31.8 % (ref 20–45)
MCH RBC QN AUTO: 27.2 PG (ref 27–31)
MCHC RBC AUTO-ENTMCNC: 30.7 G/DL (ref 33–37)
MCV RBC AUTO: 88.7 FL (ref 80–96)
MONOCYTES # BLD AUTO: 0.36 10*3/UL (ref 0.2–1.2)
MONOCYTES NFR BLD AUTO: 7.2 % (ref 3–10)
NEUTROPHILS # BLD AUTO: 2.66 10*3/UL (ref 2–8)
NEUTROPHILS NFR BLD AUTO: 52.8 % (ref 30–85)
NRBC CBCN: 0 % (ref 0–0.7)
PLATELET # BLD AUTO: 201 10*3/UL (ref 130–400)
PMV BLD AUTO: 9.4 FL (ref 9.4–12.4)
RBC # BLD AUTO: 4.08 10*6/UL (ref 4.7–6.1)
VIT B12 SERPL-MCNC: 547 PG/ML (ref 211–911)
WBC # BLD AUTO: 5.03 10*3/UL (ref 4.8–10.8)

## 2020-11-14 LAB
ANION GAP SERPL CALC-SCNC: 17 MMOL/L (ref 8–19)
BNP SERPL-MCNC: 5211 PG/ML (ref 0–1800)
BUN SERPL-MCNC: 27 MG/DL (ref 5–25)
BUN/CREAT SERPL: 16 {RATIO} (ref 6–20)
CALCIUM SERPL-MCNC: 8.8 MG/DL (ref 8.7–10.4)
CHLORIDE SERPL-SCNC: 104 MMOL/L (ref 99–111)
CONV CO2: 23 MMOL/L (ref 22–32)
CREAT UR-MCNC: 1.64 MG/DL (ref 0.7–1.2)
FERRITIN SERPL-MCNC: 287 NG/ML (ref 30–300)
GFR SERPLBLD BASED ON 1.73 SQ M-ARVRAT: 37 ML/MIN/{1.73_M2}
GLUCOSE SERPL-MCNC: 197 MG/DL (ref 70–99)
IRON SATN MFR SERPL: 16 % (ref 20–55)
IRON SERPL-MCNC: 47 UG/DL (ref 70–180)
OSMOLALITY SERPL CALC.SUM OF ELEC: 301 MOSM/KG (ref 273–304)
POTASSIUM SERPL-SCNC: 4.2 MMOL/L (ref 3.5–5.3)
SODIUM SERPL-SCNC: 140 MMOL/L (ref 135–147)
TIBC SERPL-MCNC: 295 UG/DL (ref 245–450)
TRANSFERRIN SERPL-MCNC: 206 MG/DL (ref 215–365)

## 2020-11-25 ENCOUNTER — HOSPITAL ENCOUNTER (OUTPATIENT)
Dept: OTHER | Facility: HOSPITAL | Age: 85
Discharge: HOME | End: 2020-11-25
Attending: INTERNAL MEDICINE

## 2020-11-25 LAB
ANION GAP SERPL CALC-SCNC: 18 MMOL/L (ref 8–19)
BNP SERPL-MCNC: 6577 PG/ML (ref 0–1800)
BUN SERPL-MCNC: 22 MG/DL (ref 5–25)
BUN/CREAT SERPL: 13 {RATIO} (ref 6–20)
CALCIUM SERPL-MCNC: 9.4 MG/DL (ref 8.7–10.4)
CHLORIDE SERPL-SCNC: 104 MMOL/L (ref 99–111)
CONV CO2: 23 MMOL/L (ref 22–32)
CREAT UR-MCNC: 1.68 MG/DL (ref 0.7–1.2)
GFR SERPLBLD BASED ON 1.73 SQ M-ARVRAT: 36 ML/MIN/{1.73_M2}
GLUCOSE SERPL-MCNC: 201 MG/DL (ref 70–99)
OSMOLALITY SERPL CALC.SUM OF ELEC: 301 MOSM/KG (ref 273–304)
POTASSIUM SERPL-SCNC: 4.3 MMOL/L (ref 3.5–5.3)
SODIUM SERPL-SCNC: 141 MMOL/L (ref 135–147)

## 2020-12-17 ENCOUNTER — HOSPITAL ENCOUNTER (OUTPATIENT)
Dept: OTHER | Facility: HOSPITAL | Age: 85
Discharge: HOME | End: 2020-12-17
Attending: INTERNAL MEDICINE

## 2020-12-17 LAB
ANION GAP SERPL CALC-SCNC: 16 MMOL/L (ref 8–19)
BNP SERPL-MCNC: 8674 PG/ML (ref 0–1800)
BUN SERPL-MCNC: 31 MG/DL (ref 5–25)
BUN/CREAT SERPL: 18 {RATIO} (ref 6–20)
CALCIUM SERPL-MCNC: 8.9 MG/DL (ref 8.7–10.4)
CHLORIDE SERPL-SCNC: 104 MMOL/L (ref 99–111)
CONV CO2: 24 MMOL/L (ref 22–32)
CREAT UR-MCNC: 1.71 MG/DL (ref 0.7–1.2)
EST. AVERAGE GLUCOSE BLD GHB EST-MCNC: 169 MG/DL
GFR SERPLBLD BASED ON 1.73 SQ M-ARVRAT: 35 ML/MIN/{1.73_M2}
GLUCOSE SERPL-MCNC: 217 MG/DL (ref 70–99)
HBA1C MFR BLD: 7.5 % (ref 3.5–5.7)
OSMOLALITY SERPL CALC.SUM OF ELEC: 303 MOSM/KG (ref 273–304)
POTASSIUM SERPL-SCNC: 4 MMOL/L (ref 3.5–5.3)
SODIUM SERPL-SCNC: 140 MMOL/L (ref 135–147)

## 2020-12-30 ENCOUNTER — HOSPITAL ENCOUNTER (OUTPATIENT)
Dept: OTHER | Facility: HOSPITAL | Age: 85
Discharge: HOME | End: 2020-12-30
Attending: INTERNAL MEDICINE

## 2020-12-30 LAB
FOLATE SERPL-MCNC: 9.3 NG/ML (ref 4.8–20)
VIT B12 SERPL-MCNC: 605 PG/ML (ref 211–911)

## 2021-01-21 ENCOUNTER — HOSPITAL ENCOUNTER (OUTPATIENT)
Dept: OTHER | Facility: HOSPITAL | Age: 86
Discharge: HOME | End: 2021-01-21
Attending: INTERNAL MEDICINE

## 2021-01-21 LAB
ALBUMIN SERPL-MCNC: 3.4 G/DL (ref 3.5–5)
ALBUMIN/GLOB SERPL: 1 {RATIO} (ref 1.4–2.6)
ALP SERPL-CCNC: 71 U/L (ref 56–155)
ALT SERPL-CCNC: <5 U/L (ref 10–40)
ANION GAP SERPL CALC-SCNC: 17 MMOL/L (ref 8–19)
AST SERPL-CCNC: 15 U/L (ref 15–50)
BILIRUB SERPL-MCNC: 0.24 MG/DL (ref 0.2–1.3)
BNP SERPL-MCNC: ABNORMAL PG/ML (ref 0–1800)
BUN SERPL-MCNC: 41 MG/DL (ref 5–25)
BUN/CREAT SERPL: 23 {RATIO} (ref 6–20)
CALCIUM SERPL-MCNC: 9.5 MG/DL (ref 8.7–10.4)
CHLORIDE SERPL-SCNC: 104 MMOL/L (ref 99–111)
CHOLEST SERPL-MCNC: 102 MG/DL (ref 107–200)
CHOLEST/HDLC SERPL: 2.4 {RATIO} (ref 3–6)
CONV CO2: 24 MMOL/L (ref 22–32)
CONV TOTAL PROTEIN: 6.9 G/DL (ref 6.3–8.2)
CREAT UR-MCNC: 1.75 MG/DL (ref 0.7–1.2)
GFR SERPLBLD BASED ON 1.73 SQ M-ARVRAT: 34 ML/MIN/{1.73_M2}
GLOBULIN UR ELPH-MCNC: 3.5 G/DL (ref 2–3.5)
GLUCOSE SERPL-MCNC: 202 MG/DL (ref 70–99)
HDLC SERPL-MCNC: 43 MG/DL (ref 40–60)
LDLC SERPL CALC-MCNC: 29 MG/DL (ref 70–100)
OSMOLALITY SERPL CALC.SUM OF ELEC: 306 MOSM/KG (ref 273–304)
POTASSIUM SERPL-SCNC: 4.6 MMOL/L (ref 3.5–5.3)
SODIUM SERPL-SCNC: 140 MMOL/L (ref 135–147)
TRIGL SERPL-MCNC: 150 MG/DL (ref 40–150)
VLDLC SERPL-MCNC: 30 MG/DL (ref 5–37)

## 2021-02-04 ENCOUNTER — HOSPITAL ENCOUNTER (OUTPATIENT)
Dept: OTHER | Facility: HOSPITAL | Age: 86
Discharge: HOME | End: 2021-02-04
Attending: INTERNAL MEDICINE

## 2021-02-04 LAB
ALBUMIN SERPL-MCNC: 3.4 G/DL (ref 3.5–5)
ALBUMIN/GLOB SERPL: 1 {RATIO} (ref 1.4–2.6)
ALP SERPL-CCNC: 64 U/L (ref 56–155)
ALT SERPL-CCNC: <5 U/L (ref 10–40)
ANION GAP SERPL CALC-SCNC: 16 MMOL/L (ref 8–19)
AST SERPL-CCNC: 16 U/L (ref 15–50)
BASOPHILS # BLD AUTO: 0.04 10*3/UL (ref 0–0.2)
BASOPHILS NFR BLD AUTO: 0.7 % (ref 0–3)
BILIRUB SERPL-MCNC: 0.53 MG/DL (ref 0.2–1.3)
BNP SERPL-MCNC: ABNORMAL PG/ML (ref 0–1800)
BUN SERPL-MCNC: 30 MG/DL (ref 5–25)
BUN/CREAT SERPL: 17 {RATIO} (ref 6–20)
CALCIUM SERPL-MCNC: 9.4 MG/DL (ref 8.7–10.4)
CHLORIDE SERPL-SCNC: 103 MMOL/L (ref 99–111)
CHOLEST SERPL-MCNC: 127 MG/DL (ref 107–200)
CHOLEST/HDLC SERPL: 3 {RATIO} (ref 3–6)
CONV ABS IMM GRAN: 0.02 10*3/UL (ref 0–0.2)
CONV CO2: 27 MMOL/L (ref 22–32)
CONV IMMATURE GRAN: 0.4 % (ref 0–1.8)
CONV TOTAL PROTEIN: 6.7 G/DL (ref 6.3–8.2)
CREAT UR-MCNC: 1.79 MG/DL (ref 0.7–1.2)
DEPRECATED RDW RBC AUTO: 51 FL (ref 35.1–43.9)
EOSINOPHIL # BLD AUTO: 0.32 10*3/UL (ref 0–0.7)
EOSINOPHIL # BLD AUTO: 5.8 % (ref 0–7)
ERYTHROCYTE [DISTWIDTH] IN BLOOD BY AUTOMATED COUNT: 16 % (ref 11.6–14.4)
EST. AVERAGE GLUCOSE BLD GHB EST-MCNC: 180 MG/DL
FERRITIN SERPL-MCNC: 211 NG/ML (ref 30–300)
GFR SERPLBLD BASED ON 1.73 SQ M-ARVRAT: 34 ML/MIN/{1.73_M2}
GLOBULIN UR ELPH-MCNC: 3.3 G/DL (ref 2–3.5)
GLUCOSE SERPL-MCNC: 143 MG/DL (ref 70–99)
HBA1C MFR BLD: 7.9 % (ref 3.5–5.7)
HCT VFR BLD AUTO: 37.3 % (ref 42–52)
HDLC SERPL-MCNC: 42 MG/DL (ref 40–60)
HGB BLD-MCNC: 11.6 G/DL (ref 14–18)
IRON SATN MFR SERPL: 16 % (ref 20–55)
IRON SERPL-MCNC: 49 UG/DL (ref 70–180)
LDLC SERPL CALC-MCNC: 48 MG/DL (ref 70–100)
LYMPHOCYTES # BLD AUTO: 1.49 10*3/UL (ref 1–5)
LYMPHOCYTES NFR BLD AUTO: 26.9 % (ref 20–45)
MCH RBC QN AUTO: 27.4 PG (ref 27–31)
MCHC RBC AUTO-ENTMCNC: 31.1 G/DL (ref 33–37)
MCV RBC AUTO: 88.2 FL (ref 80–96)
MONOCYTES # BLD AUTO: 0.48 10*3/UL (ref 0.2–1.2)
MONOCYTES NFR BLD AUTO: 8.7 % (ref 3–10)
NEUTROPHILS # BLD AUTO: 3.18 10*3/UL (ref 2–8)
NEUTROPHILS NFR BLD AUTO: 57.5 % (ref 30–85)
NRBC CBCN: 0 % (ref 0–0.7)
OSMOLALITY SERPL CALC.SUM OF ELEC: 303 MOSM/KG (ref 273–304)
PLATELET # BLD AUTO: 150 10*3/UL (ref 130–400)
PMV BLD AUTO: 10.1 FL (ref 9.4–12.4)
POTASSIUM SERPL-SCNC: 4.1 MMOL/L (ref 3.5–5.3)
RBC # BLD AUTO: 4.23 10*6/UL (ref 4.7–6.1)
SODIUM SERPL-SCNC: 142 MMOL/L (ref 135–147)
TIBC SERPL-MCNC: 315 UG/DL (ref 245–450)
TRANSFERRIN SERPL-MCNC: 220 MG/DL (ref 215–365)
TRIGL SERPL-MCNC: 186 MG/DL (ref 40–150)
VLDLC SERPL-MCNC: 37 MG/DL (ref 5–37)
WBC # BLD AUTO: 5.53 10*3/UL (ref 4.8–10.8)

## 2021-02-17 ENCOUNTER — HOSPITAL ENCOUNTER (OUTPATIENT)
Dept: OTHER | Facility: HOSPITAL | Age: 86
Discharge: HOME | End: 2021-02-17
Attending: INTERNAL MEDICINE

## 2021-02-17 LAB
ALBUMIN SERPL-MCNC: 3.6 G/DL (ref 3.5–5)
ALBUMIN/GLOB SERPL: 1 {RATIO} (ref 1.4–2.6)
ALP SERPL-CCNC: 83 U/L (ref 56–155)
ALT SERPL-CCNC: <5 U/L (ref 10–40)
ANION GAP SERPL CALC-SCNC: 16 MMOL/L (ref 8–19)
AST SERPL-CCNC: 19 U/L (ref 15–50)
BASOPHILS # BLD AUTO: 0.07 10*3/UL (ref 0–0.2)
BASOPHILS NFR BLD AUTO: 1.1 % (ref 0–3)
BILIRUB SERPL-MCNC: 0.45 MG/DL (ref 0.2–1.3)
BNP SERPL-MCNC: ABNORMAL PG/ML (ref 0–1800)
BUN SERPL-MCNC: 36 MG/DL (ref 5–25)
BUN/CREAT SERPL: 18 {RATIO} (ref 6–20)
CALCIUM SERPL-MCNC: 9.1 MG/DL (ref 8.7–10.4)
CHLORIDE SERPL-SCNC: 100 MMOL/L (ref 99–111)
CHOLEST SERPL-MCNC: 154 MG/DL (ref 107–200)
CHOLEST/HDLC SERPL: 4.1 {RATIO} (ref 3–6)
CONV ABS IMM GRAN: 0.02 10*3/UL (ref 0–0.2)
CONV CO2: 25 MMOL/L (ref 22–32)
CONV IMMATURE GRAN: 0.3 % (ref 0–1.8)
CONV TOTAL PROTEIN: 7.3 G/DL (ref 6.3–8.2)
CREAT UR-MCNC: 1.97 MG/DL (ref 0.7–1.2)
DEPRECATED RDW RBC AUTO: 50.3 FL (ref 35.1–43.9)
EOSINOPHIL # BLD AUTO: 0.33 10*3/UL (ref 0–0.7)
EOSINOPHIL # BLD AUTO: 5.3 % (ref 0–7)
ERYTHROCYTE [DISTWIDTH] IN BLOOD BY AUTOMATED COUNT: 15.9 % (ref 11.6–14.4)
EST. AVERAGE GLUCOSE BLD GHB EST-MCNC: 194 MG/DL
FERRITIN SERPL-MCNC: 259 NG/ML (ref 30–300)
GFR SERPLBLD BASED ON 1.73 SQ M-ARVRAT: 30 ML/MIN/{1.73_M2}
GLOBULIN UR ELPH-MCNC: 3.7 G/DL (ref 2–3.5)
GLUCOSE SERPL-MCNC: 205 MG/DL (ref 70–99)
HBA1C MFR BLD: 8.4 % (ref 3.5–5.7)
HCT VFR BLD AUTO: 38.7 % (ref 42–52)
HDLC SERPL-MCNC: 38 MG/DL (ref 40–60)
HGB BLD-MCNC: 11.9 G/DL (ref 14–18)
IRON SATN MFR SERPL: 22 % (ref 20–55)
IRON SERPL-MCNC: 72 UG/DL (ref 70–180)
LDLC SERPL CALC-MCNC: 71 MG/DL (ref 70–100)
LYMPHOCYTES # BLD AUTO: 1.82 10*3/UL (ref 1–5)
LYMPHOCYTES NFR BLD AUTO: 29.2 % (ref 20–45)
MCH RBC QN AUTO: 27 PG (ref 27–31)
MCHC RBC AUTO-ENTMCNC: 30.7 G/DL (ref 33–37)
MCV RBC AUTO: 87.8 FL (ref 80–96)
MONOCYTES # BLD AUTO: 0.51 10*3/UL (ref 0.2–1.2)
MONOCYTES NFR BLD AUTO: 8.2 % (ref 3–10)
NEUTROPHILS # BLD AUTO: 3.48 10*3/UL (ref 2–8)
NEUTROPHILS NFR BLD AUTO: 55.9 % (ref 30–85)
NRBC CBCN: 0 % (ref 0–0.7)
OSMOLALITY SERPL CALC.SUM OF ELEC: 298 MOSM/KG (ref 273–304)
PLATELET # BLD AUTO: 215 10*3/UL (ref 130–400)
PMV BLD AUTO: 9.5 FL (ref 9.4–12.4)
POTASSIUM SERPL-SCNC: 4.3 MMOL/L (ref 3.5–5.3)
RBC # BLD AUTO: 4.41 10*6/UL (ref 4.7–6.1)
SODIUM SERPL-SCNC: 137 MMOL/L (ref 135–147)
TIBC SERPL-MCNC: 322 UG/DL (ref 245–450)
TRANSFERRIN SERPL-MCNC: 225 MG/DL (ref 215–365)
TRIGL SERPL-MCNC: 223 MG/DL (ref 40–150)
VLDLC SERPL-MCNC: 45 MG/DL (ref 5–37)
WBC # BLD AUTO: 6.23 10*3/UL (ref 4.8–10.8)

## 2021-02-24 ENCOUNTER — HOSPITAL ENCOUNTER (OUTPATIENT)
Dept: OTHER | Facility: HOSPITAL | Age: 86
Discharge: HOME | End: 2021-02-24
Attending: INTERNAL MEDICINE

## 2021-02-24 ENCOUNTER — HOSPITAL ENCOUNTER (OUTPATIENT)
Dept: VACCINE CLINIC | Facility: HOSPITAL | Age: 86
Discharge: HOME | End: 2021-02-24
Attending: INTERNAL MEDICINE

## 2021-02-24 LAB
ANION GAP SERPL CALC-SCNC: 19 MMOL/L (ref 8–19)
BNP SERPL-MCNC: ABNORMAL PG/ML (ref 0–1800)
BUN SERPL-MCNC: 35 MG/DL (ref 5–25)
BUN/CREAT SERPL: 18 {RATIO} (ref 6–20)
CALCIUM SERPL-MCNC: 8.4 MG/DL (ref 8.7–10.4)
CHLORIDE SERPL-SCNC: 97 MMOL/L (ref 99–111)
CONV CO2: 23 MMOL/L (ref 22–32)
CREAT UR-MCNC: 1.97 MG/DL (ref 0.7–1.2)
GFR SERPLBLD BASED ON 1.73 SQ M-ARVRAT: 30 ML/MIN/{1.73_M2}
GLUCOSE SERPL-MCNC: 451 MG/DL (ref 70–99)
OSMOLALITY SERPL CALC.SUM OF ELEC: 308 MOSM/KG (ref 273–304)
POTASSIUM SERPL-SCNC: 4.4 MMOL/L (ref 3.5–5.3)
SODIUM SERPL-SCNC: 135 MMOL/L (ref 135–147)

## 2021-03-11 ENCOUNTER — HOSPITAL ENCOUNTER (OUTPATIENT)
Dept: OTHER | Facility: HOSPITAL | Age: 86
Discharge: HOME | End: 2021-03-11
Attending: INTERNAL MEDICINE

## 2021-03-11 LAB
ALBUMIN SERPL-MCNC: 3.5 G/DL (ref 3.5–5)
ALBUMIN/GLOB SERPL: 0.9 {RATIO} (ref 1.4–2.6)
ALP SERPL-CCNC: 95 U/L (ref 56–155)
ALT SERPL-CCNC: <5 U/L (ref 10–40)
ANION GAP SERPL CALC-SCNC: 17 MMOL/L (ref 8–19)
AST SERPL-CCNC: 16 U/L (ref 15–50)
BASOPHILS # BLD AUTO: 0.06 10*3/UL (ref 0–0.2)
BASOPHILS NFR BLD AUTO: 0.8 % (ref 0–3)
BILIRUB SERPL-MCNC: 0.59 MG/DL (ref 0.2–1.3)
BNP SERPL-MCNC: 6746 PG/ML (ref 0–1800)
BUN SERPL-MCNC: 36 MG/DL (ref 5–25)
BUN/CREAT SERPL: 18 {RATIO} (ref 6–20)
CALCIUM SERPL-MCNC: 9.6 MG/DL (ref 8.7–10.4)
CHLORIDE SERPL-SCNC: 94 MMOL/L (ref 99–111)
CHOLEST SERPL-MCNC: 164 MG/DL (ref 107–200)
CHOLEST/HDLC SERPL: 4.8 {RATIO} (ref 3–6)
CONV ABS IMM GRAN: 0.02 10*3/UL (ref 0–0.2)
CONV CO2: 26 MMOL/L (ref 22–32)
CONV IMMATURE GRAN: 0.3 % (ref 0–1.8)
CONV TOTAL PROTEIN: 7.2 G/DL (ref 6.3–8.2)
CREAT UR-MCNC: 2 MG/DL (ref 0.7–1.2)
DEPRECATED RDW RBC AUTO: 47.2 FL (ref 35.1–43.9)
EOSINOPHIL # BLD AUTO: 0.32 10*3/UL (ref 0–0.7)
EOSINOPHIL # BLD AUTO: 4.2 % (ref 0–7)
ERYTHROCYTE [DISTWIDTH] IN BLOOD BY AUTOMATED COUNT: 15.5 % (ref 11.6–14.4)
EST. AVERAGE GLUCOSE BLD GHB EST-MCNC: 235 MG/DL
GFR SERPLBLD BASED ON 1.73 SQ M-ARVRAT: 29 ML/MIN/{1.73_M2}
GLOBULIN UR ELPH-MCNC: 3.7 G/DL (ref 2–3.5)
GLUCOSE SERPL-MCNC: 356 MG/DL (ref 70–99)
HBA1C MFR BLD: 9.8 % (ref 3.5–5.7)
HCT VFR BLD AUTO: 41.3 % (ref 42–52)
HDLC SERPL-MCNC: 34 MG/DL (ref 40–60)
HGB BLD-MCNC: 13.1 G/DL (ref 14–18)
IRON SATN MFR SERPL: 19 % (ref 20–55)
IRON SERPL-MCNC: 55 UG/DL (ref 70–180)
LDLC SERPL CALC-MCNC: 60 MG/DL (ref 70–100)
LYMPHOCYTES # BLD AUTO: 1.81 10*3/UL (ref 1–5)
LYMPHOCYTES NFR BLD AUTO: 23.7 % (ref 20–45)
MCH RBC QN AUTO: 27 PG (ref 27–31)
MCHC RBC AUTO-ENTMCNC: 31.7 G/DL (ref 33–37)
MCV RBC AUTO: 85 FL (ref 80–96)
MONOCYTES # BLD AUTO: 0.71 10*3/UL (ref 0.2–1.2)
MONOCYTES NFR BLD AUTO: 9.3 % (ref 3–10)
NEUTROPHILS # BLD AUTO: 4.73 10*3/UL (ref 2–8)
NEUTROPHILS NFR BLD AUTO: 61.7 % (ref 30–85)
NRBC CBCN: 0 % (ref 0–0.7)
OSMOLALITY SERPL CALC.SUM OF ELEC: 299 MOSM/KG (ref 273–304)
PLATELET # BLD AUTO: 198 10*3/UL (ref 130–400)
PMV BLD AUTO: 9.7 FL (ref 9.4–12.4)
POTASSIUM SERPL-SCNC: 4.4 MMOL/L (ref 3.5–5.3)
RBC # BLD AUTO: 4.86 10*6/UL (ref 4.7–6.1)
SODIUM SERPL-SCNC: 133 MMOL/L (ref 135–147)
TIBC SERPL-MCNC: 295 UG/DL (ref 245–450)
TRANSFERRIN SERPL-MCNC: 206 MG/DL (ref 215–365)
TRIGL SERPL-MCNC: 348 MG/DL (ref 40–150)
VLDLC SERPL-MCNC: 70 MG/DL (ref 5–37)
WBC # BLD AUTO: 7.65 10*3/UL (ref 4.8–10.8)

## 2021-03-26 ENCOUNTER — HOSPITAL ENCOUNTER (OUTPATIENT)
Dept: VACCINE CLINIC | Facility: HOSPITAL | Age: 86
Discharge: HOME | End: 2021-03-26
Attending: INTERNAL MEDICINE

## 2021-05-12 ENCOUNTER — HOSPITAL ENCOUNTER (OUTPATIENT)
Dept: OTHER | Facility: HOSPITAL | Age: 86
Discharge: HOME | End: 2021-05-12
Attending: INTERNAL MEDICINE

## 2021-05-12 LAB
ANION GAP SERPL CALC-SCNC: 17 MMOL/L (ref 8–19)
BNP SERPL-MCNC: 5113 PG/ML (ref 0–1800)
BUN SERPL-MCNC: 36 MG/DL (ref 5–25)
BUN/CREAT SERPL: 18 {RATIO} (ref 6–20)
CALCIUM SERPL-MCNC: 9.2 MG/DL (ref 8.7–10.4)
CHLORIDE SERPL-SCNC: 99 MMOL/L (ref 99–111)
CONV CO2: 25 MMOL/L (ref 22–32)
CREAT UR-MCNC: 1.97 MG/DL (ref 0.7–1.2)
EST. AVERAGE GLUCOSE BLD GHB EST-MCNC: 278 MG/DL
GFR SERPLBLD BASED ON 1.73 SQ M-ARVRAT: 30 ML/MIN/{1.73_M2}
GLUCOSE SERPL-MCNC: 172 MG/DL (ref 70–99)
HBA1C MFR BLD: 11.3 % (ref 3.5–5.7)
OSMOLALITY SERPL CALC.SUM OF ELEC: 296 MOSM/KG (ref 273–304)
POTASSIUM SERPL-SCNC: 4.3 MMOL/L (ref 3.5–5.3)
SODIUM SERPL-SCNC: 137 MMOL/L (ref 135–147)

## 2021-06-29 ENCOUNTER — TRANSCRIBE ORDERS (OUTPATIENT)
Dept: LAB | Facility: HOSPITAL | Age: 86
End: 2021-06-29

## 2021-06-29 ENCOUNTER — LAB (OUTPATIENT)
Dept: LAB | Facility: HOSPITAL | Age: 86
End: 2021-06-29

## 2021-06-29 DIAGNOSIS — E11.22 TYPE 2 DIABETES MELLITUS WITH STAGE 2 CHRONIC KIDNEY DISEASE, UNSPECIFIED WHETHER LONG TERM INSULIN USE (HCC): ICD-10-CM

## 2021-06-29 DIAGNOSIS — N18.2 TYPE 2 DIABETES MELLITUS WITH STAGE 2 CHRONIC KIDNEY DISEASE, UNSPECIFIED WHETHER LONG TERM INSULIN USE (HCC): ICD-10-CM

## 2021-06-29 DIAGNOSIS — N18.2 TYPE 2 DIABETES MELLITUS WITH STAGE 2 CHRONIC KIDNEY DISEASE, UNSPECIFIED WHETHER LONG TERM INSULIN USE (HCC): Primary | ICD-10-CM

## 2021-06-29 DIAGNOSIS — E11.22 TYPE 2 DIABETES MELLITUS WITH STAGE 2 CHRONIC KIDNEY DISEASE, UNSPECIFIED WHETHER LONG TERM INSULIN USE (HCC): Primary | ICD-10-CM

## 2021-06-29 LAB
ANION GAP SERPL CALCULATED.3IONS-SCNC: 14.3 MMOL/L (ref 5–15)
BUN SERPL-MCNC: 36 MG/DL (ref 8–23)
BUN/CREAT SERPL: 16.9 (ref 7–25)
CALCIUM SPEC-SCNC: 8.6 MG/DL (ref 8.6–10.5)
CHLORIDE SERPL-SCNC: 98 MMOL/L (ref 98–107)
CO2 SERPL-SCNC: 23.7 MMOL/L (ref 22–29)
CREAT SERPL-MCNC: 2.13 MG/DL (ref 0.76–1.27)
GFR SERPL CREATININE-BSD FRML MDRD: 30 ML/MIN/1.73
GLUCOSE SERPL-MCNC: 277 MG/DL (ref 65–99)
HBA1C MFR BLD: 8.87 % (ref 4.8–5.6)
POTASSIUM SERPL-SCNC: 4.3 MMOL/L (ref 3.5–5.2)
SODIUM SERPL-SCNC: 136 MMOL/L (ref 136–145)

## 2021-06-29 PROCEDURE — 83036 HEMOGLOBIN GLYCOSYLATED A1C: CPT

## 2021-06-29 PROCEDURE — 80048 BASIC METABOLIC PNL TOTAL CA: CPT

## 2021-06-29 PROCEDURE — 36415 COLL VENOUS BLD VENIPUNCTURE: CPT

## 2021-06-29 PROCEDURE — 82985 ASSAY OF GLYCATED PROTEIN: CPT

## 2021-06-30 LAB — FRUCTOSAMINE SERPL-SCNC: 279 UMOL/L (ref 0–285)

## 2021-07-08 ENCOUNTER — CLINICAL SUPPORT (OUTPATIENT)
Dept: INTERNAL MEDICINE | Facility: CLINIC | Age: 86
End: 2021-07-08

## 2021-07-08 DIAGNOSIS — E53.8 B12 DEFICIENCY: Primary | ICD-10-CM

## 2021-07-08 PROCEDURE — 96372 THER/PROPH/DIAG INJ SC/IM: CPT | Performed by: INTERNAL MEDICINE

## 2021-07-08 RX ORDER — CYANOCOBALAMIN 1000 UG/ML
1000 INJECTION, SOLUTION INTRAMUSCULAR; SUBCUTANEOUS
Status: DISCONTINUED | OUTPATIENT
Start: 2021-07-08 | End: 2022-03-26

## 2021-07-08 RX ADMIN — CYANOCOBALAMIN 1000 MCG: 1000 INJECTION, SOLUTION INTRAMUSCULAR; SUBCUTANEOUS at 17:38

## 2021-07-09 DIAGNOSIS — I10 ESSENTIAL HYPERTENSION: Primary | ICD-10-CM

## 2021-07-09 RX ORDER — NIFEDIPINE 60 MG/1
60 TABLET, EXTENDED RELEASE ORAL DAILY
Qty: 90 TABLET | Refills: 0 | Status: SHIPPED | OUTPATIENT
Start: 2021-07-09 | End: 2021-11-09 | Stop reason: HOSPADM

## 2021-07-09 RX ORDER — NIFEDIPINE 60 MG/1
TABLET, EXTENDED RELEASE ORAL
COMMUNITY
Start: 2021-07-07 | End: 2021-07-09 | Stop reason: SDUPTHER

## 2021-07-22 ENCOUNTER — CLINICAL SUPPORT (OUTPATIENT)
Dept: INTERNAL MEDICINE | Facility: CLINIC | Age: 86
End: 2021-07-22

## 2021-07-22 DIAGNOSIS — E53.8 VITAMIN B12 DEFICIENCY: ICD-10-CM

## 2021-07-22 PROCEDURE — 96372 THER/PROPH/DIAG INJ SC/IM: CPT | Performed by: INTERNAL MEDICINE

## 2021-07-22 RX ADMIN — CYANOCOBALAMIN 1000 MCG: 1000 INJECTION, SOLUTION INTRAMUSCULAR; SUBCUTANEOUS at 09:10

## 2021-07-27 ENCOUNTER — TRANSCRIBE ORDERS (OUTPATIENT)
Dept: LAB | Facility: HOSPITAL | Age: 86
End: 2021-07-27

## 2021-07-27 ENCOUNTER — LAB (OUTPATIENT)
Dept: LAB | Facility: HOSPITAL | Age: 86
End: 2021-07-27

## 2021-07-27 DIAGNOSIS — E55.9 VITAMIN D DEFICIENCY, UNSPECIFIED: ICD-10-CM

## 2021-07-27 DIAGNOSIS — N18.31 STAGE 3A CHRONIC KIDNEY DISEASE (HCC): Primary | ICD-10-CM

## 2021-07-27 DIAGNOSIS — N18.31 STAGE 3A CHRONIC KIDNEY DISEASE (HCC): ICD-10-CM

## 2021-07-27 LAB
25(OH)D3 SERPL-MCNC: 30.4 NG/ML
ALBUMIN SERPL-MCNC: 3.7 G/DL (ref 3.5–5.2)
ANION GAP SERPL CALCULATED.3IONS-SCNC: 13.9 MMOL/L (ref 5–15)
BASOPHILS # BLD AUTO: 0.06 10*3/MM3 (ref 0–0.2)
BASOPHILS NFR BLD AUTO: 0.8 % (ref 0–1.5)
BILIRUB UR QL STRIP: NEGATIVE
BUN SERPL-MCNC: 37 MG/DL (ref 8–23)
BUN/CREAT SERPL: 19 (ref 7–25)
CALCIUM SPEC-SCNC: 8.6 MG/DL (ref 8.6–10.5)
CHLORIDE SERPL-SCNC: 101 MMOL/L (ref 98–107)
CLARITY UR: CLEAR
CO2 SERPL-SCNC: 22.1 MMOL/L (ref 22–29)
COLOR UR: YELLOW
CREAT SERPL-MCNC: 1.95 MG/DL (ref 0.76–1.27)
CREAT UR-MCNC: 141 MG/DL
DEPRECATED RDW RBC AUTO: 47.4 FL (ref 37–54)
EOSINOPHIL # BLD AUTO: 0.37 10*3/MM3 (ref 0–0.4)
EOSINOPHIL NFR BLD AUTO: 4.7 % (ref 0.3–6.2)
ERYTHROCYTE [DISTWIDTH] IN BLOOD BY AUTOMATED COUNT: 14.6 % (ref 12.3–15.4)
GFR SERPL CREATININE-BSD FRML MDRD: 33 ML/MIN/1.73
GLUCOSE SERPL-MCNC: 263 MG/DL (ref 65–99)
GLUCOSE UR STRIP-MCNC: NEGATIVE MG/DL
HCT VFR BLD AUTO: 35.4 % (ref 37.5–51)
HGB BLD-MCNC: 11.5 G/DL (ref 13–17.7)
HGB UR QL STRIP.AUTO: NEGATIVE
IMM GRANULOCYTES # BLD AUTO: 0.05 10*3/MM3 (ref 0–0.05)
IMM GRANULOCYTES NFR BLD AUTO: 0.6 % (ref 0–0.5)
KETONES UR QL STRIP: NEGATIVE
LEUKOCYTE ESTERASE UR QL STRIP.AUTO: NEGATIVE
LYMPHOCYTES # BLD AUTO: 1.85 10*3/MM3 (ref 0.7–3.1)
LYMPHOCYTES NFR BLD AUTO: 23.5 % (ref 19.6–45.3)
MCH RBC QN AUTO: 28.8 PG (ref 26.6–33)
MCHC RBC AUTO-ENTMCNC: 32.5 G/DL (ref 31.5–35.7)
MCV RBC AUTO: 88.7 FL (ref 79–97)
MONOCYTES # BLD AUTO: 0.57 10*3/MM3 (ref 0.1–0.9)
MONOCYTES NFR BLD AUTO: 7.2 % (ref 5–12)
NEUTROPHILS NFR BLD AUTO: 4.97 10*3/MM3 (ref 1.7–7)
NEUTROPHILS NFR BLD AUTO: 63.2 % (ref 42.7–76)
NITRITE UR QL STRIP: NEGATIVE
NRBC BLD AUTO-RTO: 0.1 /100 WBC (ref 0–0.2)
PH UR STRIP.AUTO: 5.5 [PH] (ref 5–8)
PHOSPHATE SERPL-MCNC: 3.7 MG/DL (ref 2.5–4.5)
PLATELET # BLD AUTO: 192 10*3/MM3 (ref 140–450)
PMV BLD AUTO: 9.2 FL (ref 6–12)
POTASSIUM SERPL-SCNC: 4 MMOL/L (ref 3.5–5.2)
PROT UR QL STRIP: NEGATIVE
PROT UR-MCNC: 8 MG/DL
PTH-INTACT SERPL-MCNC: 79.5 PG/ML (ref 15–65)
RBC # BLD AUTO: 3.99 10*6/MM3 (ref 4.14–5.8)
SODIUM SERPL-SCNC: 137 MMOL/L (ref 136–145)
SP GR UR STRIP: 1.02 (ref 1–1.03)
UROBILINOGEN UR QL STRIP: NORMAL
WBC # BLD AUTO: 7.87 10*3/MM3 (ref 3.4–10.8)

## 2021-07-27 PROCEDURE — 85025 COMPLETE CBC W/AUTO DIFF WBC: CPT

## 2021-07-27 PROCEDURE — 36415 COLL VENOUS BLD VENIPUNCTURE: CPT

## 2021-07-27 PROCEDURE — 80069 RENAL FUNCTION PANEL: CPT

## 2021-07-27 PROCEDURE — 82570 ASSAY OF URINE CREATININE: CPT

## 2021-07-27 PROCEDURE — 83970 ASSAY OF PARATHORMONE: CPT

## 2021-07-27 PROCEDURE — 82306 VITAMIN D 25 HYDROXY: CPT

## 2021-07-27 PROCEDURE — 81003 URINALYSIS AUTO W/O SCOPE: CPT

## 2021-07-27 PROCEDURE — 84156 ASSAY OF PROTEIN URINE: CPT

## 2021-07-27 RX ORDER — FUROSEMIDE 40 MG/1
TABLET ORAL
Qty: 60 TABLET | Refills: 0 | Status: SHIPPED | OUTPATIENT
Start: 2021-07-27 | End: 2021-08-23

## 2021-07-28 RX ORDER — LANCETS 32 GAUGE
EACH MISCELLANEOUS
COMMUNITY
Start: 2021-06-11 | End: 2021-09-08

## 2021-07-28 RX ORDER — ISOSORBIDE MONONITRATE 30 MG/1
30 TABLET, EXTENDED RELEASE ORAL EVERY MORNING
COMMUNITY
Start: 2021-07-05 | End: 2021-10-04

## 2021-07-28 RX ORDER — CYANOCOBALAMIN 1000 UG/ML
INJECTION, SOLUTION INTRAMUSCULAR; SUBCUTANEOUS
COMMUNITY
Start: 2021-06-01 | End: 2021-10-28

## 2021-07-28 RX ORDER — FERROUS SULFATE 325(65) MG
1 TABLET ORAL DAILY
COMMUNITY
Start: 2021-06-21 | End: 2021-09-14

## 2021-07-28 RX ORDER — ERGOCALCIFEROL 1.25 MG/1
50000 CAPSULE ORAL WEEKLY
COMMUNITY
Start: 2021-06-07 | End: 2022-03-26

## 2021-07-28 RX ORDER — INSULIN GLARGINE 100 [IU]/ML
INJECTION, SOLUTION SUBCUTANEOUS
COMMUNITY
Start: 2021-07-05 | End: 2021-09-17

## 2021-07-28 RX ORDER — GLIMEPIRIDE 2 MG/1
TABLET ORAL
COMMUNITY
Start: 2021-06-07 | End: 2021-08-17

## 2021-07-28 RX ORDER — METFORMIN HYDROCHLORIDE 500 MG/1
500 TABLET, EXTENDED RELEASE ORAL 2 TIMES DAILY
COMMUNITY
Start: 2021-05-19 | End: 2021-12-22 | Stop reason: HOSPADM

## 2021-07-28 RX ORDER — PRAVASTATIN SODIUM 40 MG
40 TABLET ORAL DAILY
COMMUNITY
Start: 2021-05-24 | End: 2021-08-23

## 2021-07-28 RX ORDER — FLUOROURACIL 50 MG/G
CREAM TOPICAL
COMMUNITY
Start: 2021-04-23 | End: 2021-10-28

## 2021-07-28 RX ORDER — DOXYCYCLINE 100 MG/1
CAPSULE ORAL
COMMUNITY
Start: 2021-04-29 | End: 2021-08-18

## 2021-07-28 RX ORDER — HYDRALAZINE HYDROCHLORIDE 50 MG/1
50 TABLET, FILM COATED ORAL 2 TIMES DAILY
COMMUNITY
Start: 2021-07-05 | End: 2021-10-04

## 2021-07-28 RX ORDER — PEN NEEDLE, DIABETIC 32GX 5/32"
NEEDLE, DISPOSABLE MISCELLANEOUS
COMMUNITY
Start: 2021-06-04 | End: 2021-10-28

## 2021-08-05 ENCOUNTER — TRANSCRIBE ORDERS (OUTPATIENT)
Dept: LAB | Facility: HOSPITAL | Age: 86
End: 2021-08-05

## 2021-08-05 ENCOUNTER — LAB (OUTPATIENT)
Dept: LAB | Facility: HOSPITAL | Age: 86
End: 2021-08-05

## 2021-08-05 DIAGNOSIS — N18.32 CHRONIC KIDNEY DISEASE (CKD) STAGE G3B/A1, MODERATELY DECREASED GLOMERULAR FILTRATION RATE (GFR) BETWEEN 30-44 ML/MIN/1.73 SQUARE METER AND ALBUMINURIA CREATININE RATIO LESS THAN 30 MG/G (CMS/H* (HCC): ICD-10-CM

## 2021-08-05 DIAGNOSIS — E11.22 TYPE 2 DIABETES MELLITUS WITH ESRD (END-STAGE RENAL DISEASE) (HCC): ICD-10-CM

## 2021-08-05 DIAGNOSIS — N18.6 TYPE 2 DIABETES MELLITUS WITH ESRD (END-STAGE RENAL DISEASE) (HCC): Primary | ICD-10-CM

## 2021-08-05 DIAGNOSIS — E11.22 TYPE 2 DIABETES MELLITUS WITH ESRD (END-STAGE RENAL DISEASE) (HCC): Primary | ICD-10-CM

## 2021-08-05 DIAGNOSIS — E78.2 MIXED HYPERLIPIDEMIA: ICD-10-CM

## 2021-08-05 DIAGNOSIS — I50.32 CHRONIC DIASTOLIC HEART FAILURE (HCC): ICD-10-CM

## 2021-08-05 DIAGNOSIS — N18.6 TYPE 2 DIABETES MELLITUS WITH ESRD (END-STAGE RENAL DISEASE) (HCC): ICD-10-CM

## 2021-08-05 LAB
ALBUMIN SERPL-MCNC: 3.7 G/DL (ref 3.5–5.2)
ALBUMIN/GLOB SERPL: 1 G/DL
ALP SERPL-CCNC: 66 U/L (ref 39–117)
ALT SERPL W P-5'-P-CCNC: 5 U/L (ref 1–41)
ANION GAP SERPL CALCULATED.3IONS-SCNC: 15.2 MMOL/L (ref 5–15)
AST SERPL-CCNC: 15 U/L (ref 1–40)
BILIRUB SERPL-MCNC: 0.4 MG/DL (ref 0–1.2)
BUN SERPL-MCNC: 30 MG/DL (ref 8–23)
BUN/CREAT SERPL: 17.2 (ref 7–25)
CALCIUM SPEC-SCNC: 9.3 MG/DL (ref 8.6–10.5)
CHLORIDE SERPL-SCNC: 99 MMOL/L (ref 98–107)
CHOLEST SERPL-MCNC: 155 MG/DL (ref 0–200)
CO2 SERPL-SCNC: 24.8 MMOL/L (ref 22–29)
CREAT SERPL-MCNC: 1.74 MG/DL (ref 0.76–1.27)
DEPRECATED RDW RBC AUTO: 46.2 FL (ref 37–54)
EOSINOPHIL # BLD MANUAL: 0.64 10*3/MM3 (ref 0–0.4)
EOSINOPHIL NFR BLD MANUAL: 8.7 % (ref 0.3–6.2)
ERYTHROCYTE [DISTWIDTH] IN BLOOD BY AUTOMATED COUNT: 14.5 % (ref 12.3–15.4)
GFR SERPL CREATININE-BSD FRML MDRD: 37 ML/MIN/1.73
GLOBULIN UR ELPH-MCNC: 3.6 GM/DL
GLUCOSE SERPL-MCNC: 102 MG/DL (ref 65–99)
HBA1C MFR BLD: 7.7 % (ref 4.8–5.6)
HCT VFR BLD AUTO: 36.3 % (ref 37.5–51)
HDLC SERPL-MCNC: 43 MG/DL (ref 40–60)
HGB BLD-MCNC: 11.8 G/DL (ref 13–17.7)
LDLC SERPL CALC-MCNC: 75 MG/DL (ref 0–100)
LDLC/HDLC SERPL: 1.57 {RATIO}
LYMPHOCYTES # BLD MANUAL: 1.75 10*3/MM3 (ref 0.7–3.1)
LYMPHOCYTES NFR BLD MANUAL: 23.9 % (ref 19.6–45.3)
LYMPHOCYTES NFR BLD MANUAL: 5.4 % (ref 5–12)
MCH RBC QN AUTO: 28.7 PG (ref 26.6–33)
MCHC RBC AUTO-ENTMCNC: 32.5 G/DL (ref 31.5–35.7)
MCV RBC AUTO: 88.3 FL (ref 79–97)
MONOCYTES # BLD AUTO: 0.4 10*3/MM3 (ref 0.1–0.9)
NEUTROPHILS # BLD AUTO: 4.55 10*3/MM3 (ref 1.7–7)
NEUTROPHILS NFR BLD MANUAL: 62 % (ref 42.7–76)
NT-PROBNP SERPL-MCNC: 5280 PG/ML (ref 0–1800)
PLAT MORPH BLD: NORMAL
PLATELET # BLD AUTO: 287 10*3/MM3 (ref 140–450)
PMV BLD AUTO: 9.1 FL (ref 6–12)
POTASSIUM SERPL-SCNC: 4.1 MMOL/L (ref 3.5–5.2)
PROT SERPL-MCNC: 7.3 G/DL (ref 6–8.5)
RBC # BLD AUTO: 4.11 10*6/MM3 (ref 4.14–5.8)
RBC MORPH BLD: NORMAL
SODIUM SERPL-SCNC: 139 MMOL/L (ref 136–145)
TRIGL SERPL-MCNC: 222 MG/DL (ref 0–150)
VLDLC SERPL-MCNC: 37 MG/DL (ref 5–40)
WBC # BLD AUTO: 7.34 10*3/MM3 (ref 3.4–10.8)
WBC MORPH BLD: NORMAL

## 2021-08-05 PROCEDURE — 85025 COMPLETE CBC W/AUTO DIFF WBC: CPT

## 2021-08-05 PROCEDURE — 80053 COMPREHEN METABOLIC PANEL: CPT

## 2021-08-05 PROCEDURE — 83880 ASSAY OF NATRIURETIC PEPTIDE: CPT

## 2021-08-05 PROCEDURE — 80061 LIPID PANEL: CPT

## 2021-08-05 PROCEDURE — 83036 HEMOGLOBIN GLYCOSYLATED A1C: CPT

## 2021-08-05 PROCEDURE — 36415 COLL VENOUS BLD VENIPUNCTURE: CPT

## 2021-08-05 PROCEDURE — 85007 BL SMEAR W/DIFF WBC COUNT: CPT

## 2021-08-06 ENCOUNTER — CLINICAL SUPPORT (OUTPATIENT)
Dept: INTERNAL MEDICINE | Facility: CLINIC | Age: 86
End: 2021-08-06

## 2021-08-06 DIAGNOSIS — E53.8 VITAMIN B12 DEFICIENCY: Primary | ICD-10-CM

## 2021-08-06 PROCEDURE — 96372 THER/PROPH/DIAG INJ SC/IM: CPT | Performed by: INTERNAL MEDICINE

## 2021-08-06 RX ADMIN — CYANOCOBALAMIN 1000 MCG: 1000 INJECTION, SOLUTION INTRAMUSCULAR; SUBCUTANEOUS at 10:33

## 2021-08-15 PROBLEM — Z79.4 TYPE 2 DIABETES MELLITUS WITHOUT COMPLICATION, WITH LONG-TERM CURRENT USE OF INSULIN (HCC): Status: ACTIVE | Noted: 2021-08-15

## 2021-08-15 PROBLEM — E11.9 TYPE 2 DIABETES MELLITUS WITHOUT COMPLICATION, WITH LONG-TERM CURRENT USE OF INSULIN: Status: ACTIVE | Noted: 2021-08-15

## 2021-08-15 PROBLEM — I50.32 CHRONIC DIASTOLIC HEART FAILURE (HCC): Status: ACTIVE | Noted: 2021-08-15

## 2021-08-15 PROBLEM — I50.42 CHRONIC COMBINED SYSTOLIC (CONGESTIVE) AND DIASTOLIC (CONGESTIVE) HEART FAILURE (HCC): Status: ACTIVE | Noted: 2021-08-15

## 2021-08-15 PROBLEM — N18.32 STAGE 3B CHRONIC KIDNEY DISEASE (HCC): Status: ACTIVE | Noted: 2021-08-15

## 2021-08-15 PROBLEM — E11.621 ULCER OF FOOT DUE TO TYPE 2 DIABETES MELLITUS (HCC): Status: ACTIVE | Noted: 2021-08-15

## 2021-08-15 PROBLEM — L97.529 NON-PRESSURE CHRONIC ULCER OF OTHER PART OF LEFT FOOT WITH UNSPECIFIED SEVERITY: Status: ACTIVE | Noted: 2021-08-15

## 2021-08-15 PROBLEM — L97.509 ULCER OF FOOT DUE TO TYPE 2 DIABETES MELLITUS (HCC): Status: ACTIVE | Noted: 2021-08-15

## 2021-08-15 PROBLEM — I25.10 ATHEROSCLEROSIS OF NATIVE CORONARY ARTERY OF NATIVE HEART WITHOUT ANGINA PECTORIS: Status: ACTIVE | Noted: 2021-08-15

## 2021-08-15 PROBLEM — M15.9 PRIMARY OSTEOARTHRITIS INVOLVING MULTIPLE JOINTS: Status: ACTIVE | Noted: 2021-08-15

## 2021-08-15 PROBLEM — E78.2 MIXED HYPERLIPIDEMIA: Status: ACTIVE | Noted: 2021-08-15

## 2021-08-15 NOTE — PROGRESS NOTES
"Chief Complaint  Diabetes and Follow-up (Pt states that his right hand is sore. Look swollen, but doesn't remember doing anything. )    Subjective          Betty Givens presents to [facility]     History of Present Illness     86-year-old male with multiple medical problems including hypertension, hyperlipidemia, diabetes mellitus on insulin, to name a few unfortunately, who is coming in for routine 3-month follow-up.  We will review his labs and evaluate any new concerns he may have.    Review of Systems   Constitutional: Negative for appetite change, fatigue and fever.   HENT: Negative for congestion and ear pain.    Eyes: Negative for blurred vision.   Respiratory: Negative for cough, chest tightness, shortness of breath and wheezing.    Cardiovascular: Negative for chest pain, palpitations and leg swelling.   Gastrointestinal: Negative for abdominal pain.   Genitourinary: Negative for difficulty urinating, dysuria and hematuria.   Musculoskeletal: Negative for arthralgias and gait problem.   Skin: Negative for skin lesions.   Neurological: Negative for syncope, memory problem and confusion.   Psychiatric/Behavioral: Negative for self-injury and depressed mood.       Objective   Vital Signs:   /71   Pulse 68   Temp 97.3 °F (36.3 °C)   Ht 185.4 cm (73\")   Wt 118 kg (260 lb)   SpO2 94%   BMI 34.30 kg/m²           Physical Exam  Vitals and nursing note reviewed.   Constitutional:       General: He is not in acute distress.     Appearance: Normal appearance. He is not toxic-appearing.   HENT:      Head: Atraumatic.      Right Ear: External ear normal.      Left Ear: External ear normal.      Nose: Nose normal.      Mouth/Throat:      Mouth: Mucous membranes are moist.   Eyes:      General:         Right eye: No discharge.         Left eye: No discharge.      Extraocular Movements: Extraocular movements intact.      Pupils: Pupils are equal, round, and reactive to " light.   Cardiovascular:      Rate and Rhythm: Normal rate and regular rhythm.      Pulses: Normal pulses.      Heart sounds: Normal heart sounds. No murmur heard.   No gallop.    Pulmonary:      Effort: Pulmonary effort is normal. No respiratory distress.      Breath sounds: No wheezing, rhonchi or rales.   Abdominal:      General: There is no distension.      Palpations: Abdomen is soft. There is no mass.      Tenderness: There is no abdominal tenderness. There is no guarding.   Musculoskeletal:         General: No swelling or tenderness.      Cervical back: No tenderness.      Right lower leg: No edema.      Left lower leg: No edema.   Skin:     General: Skin is warm and dry.      Findings: No rash.   Neurological:      General: No focal deficit present.      Mental Status: He is alert and oriented to person, place, and time. Mental status is at baseline.      Motor: No weakness.      Gait: Gait normal.   Psychiatric:         Mood and Affect: Mood normal.         Thought Content: Thought content normal.          Result Review :   The following data was reviewed by: Mauro Cuellar MD on 08/18/2021:                  Assessment and Plan    Diagnoses and all orders for this visit:    1. Type 2 diabetes mellitus without complication, with long-term current use of insulin (CMS/[facility]) (Primary)  Overview:  A1c was over 11 earlier this year, is been gradually improving, presently is 7.7.  Patient is on 4 agents for this, needs to continue as is for now, perhaps we could try to wean Actos later in regards to his fluid retention.  I would need to review his prior notes for so.    Orders:  -     Hemoglobin A1c; Future  -     Basic Metabolic Panel; Future    2. Stage 3b chronic kidney disease (CMS/[facility])  Overview:  Patient is followed by Dr. Alvarez's group, his GFR was 30 earlier in the year, it is presently 37.  Continue same plan of care.      3. Mixed hyperlipidemia  Overview:  Patient's LDL 75 is at goal as of his August 2021  appointment.  He is maintained on low-dose statin therapy with pravastatin 40 mg.  Continue same.      4. Essential hypertension  Overview:  Blood pressures well controlled as of his August 2021 appointment.  He is on 4 agents in addition to Lasix.  Continues all going forward.      5. Atherosclerosis of native coronary artery of native heart without angina pectoris  Overview:  Patient with no ischemia on Imdur and nifedipine as well as beta-blocker.  Continue same meds.      6. Chronic combined systolic (congestive) and diastolic (congestive) heart failure (CMS/HCC)  Overview:  BNP is stable in the 5000 ballpark.  He is maintained on Lasix 40 mg daily.  Additionally he is on ACE inhibitor and beta-blocker.  He was evaluated by Dr. Montero in just last week, and no changes were made to his regimen.  Continues all same meds going forward.    Orders:  -     BNP; Future    7. Primary osteoarthritis involving multiple joints    8. Iron deficiency anemia secondary to inadequate dietary iron intake  Overview:  Hemoglobin of 11.8 is stable.  This is likely related to his chronic kidney disease.  We will get iron studies on return to office.    Orders:  -     CBC & Differential; Future  -     Iron Profile; Future  -     Ferritin; Future    9. Other fatigue  Overview:  Need repeat thyroid level on return to office.    Orders:  -     TSH; Future  -     T4, free; Future    10. Acute idiopathic gout of right hand  Overview:  Patient swelling in his right hand thumb area, does not recall any particular trauma.  There is no evidence of any cellulitis.  We will give a short course of colchicine and get a uric acid level when he comes back.    Orders:  -     Uric acid; Future  -     XR Hand 3+ View Right; Future    Other orders  -     colchicine 0.6 MG tablet; Take 1 tablet by mouth 2 (Two) Times a Day for 14 days.  Dispense: 28 tablet; Refill: 0       --  --  VISIT 5/21:  [facility] 5/21 = d/w pt in office all  forms; no new discovery; Edin neg.  HOSP F/U 9/20:  1) ANASARCA with failure to respond to out-pt diuretics; get labs on admit, then f/u on bid lasix...improving already=legs and RUE; labs fine, so continue same lasix=dose increased per cardiology...lasix held this AM and will f/u labs in AM=resume PO lasix now 7/24...volume sounds better; defer to HH RN...sister says he has no edema...HH says no edema as of 12/20 OV...MASSIVE EDEMA AS OF 1/21 OV; I already increased him to 40 mg qd, but BNP 17K, so will try bid and have close f/u labs---> legs are great 3/21, so will leave meds as is=ditto 5/21.  2) A/C COMBINED HF is likely contributing to the above; will get BNP and repeat ECHO this admit...BNP 3500 with ECHO pending; lasix as above...ECHO with EF 47%; no WMA; f/u BNP noted, but likely due to the bump in renal fxn=sats fine; f/u labs at [facility]...no orthopena; needs labs=3.7K is stable 9/20...3.9K is stable as of 10/20 OV; no dyspnea... we increased to 20/40 alt for BNP 5-6K, so repeat this week as of 12/20...as above, 40 bid...BNP was as high 17K and now is 6.7K, so conitnue same dosing=no labored resp in office---> 5.1K as of 5/21 OV is fine.    3) DM-OOC with need for SSI and d/c all PO meds and go with insulin only; ? Actos contributing to edema...increase levemir...BS better this AM=same meds at [facility]...is back on orals and Lantus only; he will not be able to do humalog at home=continue amaryl; A1C 7.4 in 9/20 = great...BS is fine per her, but A1C was up last month, so Amaryl increased to 2 mg...BS in the 60's in the AM; I d/w lower Lantus to 25 U 1/21 OV...9.8 and he is high all day long now; will increase Lantus to 30 units and increase amaryl form 2 qd to 4 mg AM and 2 mg PM---> 11.3, so will try 40 units Lantus, 4 bid of amaryl, metformin 500 bid, and keep him on actos 30 as well.    4) HTN well controlled 3/21.  5) CKD3b with need for close f/u of labs=some better 7/21 with diuresis...bumped today; f/u in  AM on higher dose lasix...up 37/1.7 to 52/2.5; holding lasix today...labs better 7/24 and will resume lower dose ACEI and higher dose PO lasix at time of d/c...GFR 42% is better 9/20...42% holding...34% is hopefully due to the CHF as of 1/21 OV...29% is ok for now 3/21---> 30% in 5/21 holding.    6) DJD/GAIT DYSFXN per OT/PT---> completed this and is using RW at home; in W/C for OV only.  7) FE DEF ANEMIA=Venofer x 4 doses in [facility]...Hgb 10.8 is holding, but Fe only 36, so need PO meds now 9/20...Hgb 11.2, Fe only 64, but ferritin 480, so if it's higher on repeat, will need to stop ferrous sulfate=RTO 2/21---> Hgb 13.1 as of 3/21.  8) B12 DEF=IM B12 #4 and will give at NH q Mo now...ditto and will have  give shots=> 2K, so continue same for now---> stable 1/21.    9) LIPIDS=LDL 60 in 3/21.  --  --  OLDER NOTES:  VISIT 6/20:  [facility] 4/20 = d/w pt in office all forms; no new discovery.  H.M. ISSUES:  DM = A1C as below and saw Optho 1/18 = no diabetic retinopathy.  --  CAD/ABNL EKG=pre-op for R TKR and was to have seen cards=saw Dr Carbajal, had cath it sounds like, didn't want surgery?, and given imdur/plavix, but can't take the plavix, so I rec 2 baby aspirin to start...saw him last week and no changes made/ no CP/SOB---> stable and no changes by Dr CASANOVA per pt.   LIPIDS at goal with LDL 38 (4/15)...36 and can't lower dose since only on 2.5 qd...35 is excellent...70 with TG's 650 due to DM-OOC...430...270 with no med for this=control of BS; LDL 79, so change to lipitor since losing crestor and increase relative dose...LDL 63, but TG still 400, however sugar up again, so will not start fenofibrate just yet=3/18...LDL 52 and still working on DM control...LDL 38 and TG's = poorly controlled DM...changed to pravastatin 1/20 due to aches and she says that helped; defer at 5/20 OV=addressing below first--->117 and needs increase dose since off lipitor due to aches.  --  DM-OOC...saw diabetes educators  "already...says glipizide is different pill and worse since \"change\"...not sure what he is referring to, but will change to full dose amaryl...d/w diet also...no wt loss, no change A1C=7.8...will stop januvia given his concern and also do to the fact that needs insulin anyhow...A1C down slightly to 7.6 and has f/u with diabetes ed tomorrow, so will defer changes to them... A1C 7.6 so ok to watch for now...7.7 but AM  so no change lantus/etc...7.6 and will leave alone given age...8.1 so will increase from 19 to 25 U qd...35 units now and ? less metformin and SSI/etc on RTO given CKD...7.2 and will try lower amaryl 4 bid to 4 qd given 70's and sx's...6.8 is great, so will lower metformin now given CKD...Metformin stopped 1/17 due to worseining ckd, hypoglycemia better since being off of it...9.9 and will resume metformin at lower dose=500 bid instead of 850 tid=he likely hadn't weaned it like I said and when stopped was on tid...8.8 but is acidotic (no diarrhea/etc) so have to stop metformin again and try actos to keep it simple; increase lantus 40 units as well...7.3 is great, but wt up 30 lbs in past year, 15 since adding actos; so will stop it and try qd metformin to help with wt...on qd metformin=7.9 so will try increasing lantus to 50 since he feels better off actos...8.1, on max januvia/metformin given CKD3-4, so will increase amaryl to 1/2 bid...no better=8.3, so I rec bid metformin again and close f/u renal panels... 8.7 and he c/o lows=80's and will drink a Pepsis=I d/w sips only to bring it up; see if Bydureon covered...he says , so needs more coverage during the day for the A1C of 8.9 at 3/19 OV=increase amaryl whole AM but still just 1/2 in PM... 8.6 just 6 weeks later with a Fructosamine of 270, so no changes made 4/19 OV...7.9 is good, but may need to loose metformin...8.1 with bicarb 15 now; he is OFF metformin; will try actos again (bybernadinereoon $$)---> 9.0 and FBS up at lab; Actos to 30 and " Lantus 60...1/20 urgent visit=only on 2 mg amaryl qd but still with lows; down to 40 of Lantus as well; I d/w to stop amaryl...labs 5/20 with non-fasting BS of 400, so lows aren't an issue now=defer changes until see next A1C---> A1C is 10.2, so he needs to get back on the amaryl, and will try 1 mg.  DFU 12/19 = I drained the heck out of a small lesion L 5th toe; needs film, augmentin, and eval per podiatry...lesion healed as of 4/20; has flat feet bilaterally and previous ulcer=needs shoes.  --  HTN well controlled=on Lisinopirl/Hct per me AND PRN Mazxide per Dr Carbajal=renal panel stable, so defer...reasonably controlled after change ACEI to hydralazine due to persistent hyperkalemia---> stable 6/20.  CKD 3...22/1.6 (42%) = stable, but recall still on metformin full dose and nsaid...20/1.7 and may lower metformin to bid on RTO...26/1.5 (44%) and rec 850 bid now...d/c Metformin as of 1/17 and will have patient do Chem 7 in 2 weeks...42% stable/same, so why so acidotic...36% is holding and will see if tolerates just 500 qd of metformin given wt gain...34% ok, but bicarb down, so needs close f/u on metformin...40% with no changes made per Renal...33% and will try bid since sugars/wt heading wrong way...33% is holding and saw them this week; watching bicarb...35%, but bicarb lower 9/19...33% with better bicarb---> 34% is nice to see 6/20; will add bicarb tab since its still down (is seeing Dr Alvarez).  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  Patient is unable to safely use a cane or walker. Due to immobility related to severe DJD, a standard wheelchair is needed to assist with daily living activities inside the home. Patient has upper body strength and metal capabilities to propel the wheelchair inside the home.     GERD = c/o at 10/16 OV, so  will give PPI for 2 mo.  --  --  PSA...no more.  COLON 11/11...normal/FH+ polyps only...defer given age.  PNEUMOVAX x 2; Prevnar '15; COVID x 1 as of 3/21.  (Single, lives with sister, Manuel is his other brother)    Follow Up   Return in about 3 months (around 11/18/2021).  Patient was given instructions and counseling regarding his condition or for health maintenance advice. Please see specific information pulled into the AVS if appropriate.

## 2021-08-17 RX ORDER — GLIMEPIRIDE 2 MG/1
TABLET ORAL
Qty: 270 TABLET | Refills: 0 | Status: SHIPPED | OUTPATIENT
Start: 2021-08-17 | End: 2021-11-09 | Stop reason: HOSPADM

## 2021-08-18 ENCOUNTER — OFFICE VISIT (OUTPATIENT)
Dept: INTERNAL MEDICINE | Facility: CLINIC | Age: 86
End: 2021-08-18

## 2021-08-18 VITALS
DIASTOLIC BLOOD PRESSURE: 71 MMHG | BODY MASS INDEX: 34.46 KG/M2 | WEIGHT: 260 LBS | OXYGEN SATURATION: 94 % | SYSTOLIC BLOOD PRESSURE: 129 MMHG | HEART RATE: 68 BPM | HEIGHT: 73 IN | TEMPERATURE: 97.3 F

## 2021-08-18 DIAGNOSIS — E78.2 MIXED HYPERLIPIDEMIA: ICD-10-CM

## 2021-08-18 DIAGNOSIS — D50.8 IRON DEFICIENCY ANEMIA SECONDARY TO INADEQUATE DIETARY IRON INTAKE: ICD-10-CM

## 2021-08-18 DIAGNOSIS — I10 ESSENTIAL HYPERTENSION: ICD-10-CM

## 2021-08-18 DIAGNOSIS — R53.83 OTHER FATIGUE: ICD-10-CM

## 2021-08-18 DIAGNOSIS — N18.32 STAGE 3B CHRONIC KIDNEY DISEASE (HCC): ICD-10-CM

## 2021-08-18 DIAGNOSIS — I50.42 CHRONIC COMBINED SYSTOLIC (CONGESTIVE) AND DIASTOLIC (CONGESTIVE) HEART FAILURE (HCC): ICD-10-CM

## 2021-08-18 DIAGNOSIS — M15.9 PRIMARY OSTEOARTHRITIS INVOLVING MULTIPLE JOINTS: ICD-10-CM

## 2021-08-18 DIAGNOSIS — E11.9 TYPE 2 DIABETES MELLITUS WITHOUT COMPLICATION, WITH LONG-TERM CURRENT USE OF INSULIN (HCC): Primary | ICD-10-CM

## 2021-08-18 DIAGNOSIS — M10.041 ACUTE IDIOPATHIC GOUT OF RIGHT HAND: ICD-10-CM

## 2021-08-18 DIAGNOSIS — I25.10 ATHEROSCLEROSIS OF NATIVE CORONARY ARTERY OF NATIVE HEART WITHOUT ANGINA PECTORIS: ICD-10-CM

## 2021-08-18 DIAGNOSIS — Z79.4 TYPE 2 DIABETES MELLITUS WITHOUT COMPLICATION, WITH LONG-TERM CURRENT USE OF INSULIN (HCC): Primary | ICD-10-CM

## 2021-08-18 PROCEDURE — 96372 THER/PROPH/DIAG INJ SC/IM: CPT | Performed by: INTERNAL MEDICINE

## 2021-08-18 PROCEDURE — 99214 OFFICE O/P EST MOD 30 MIN: CPT | Performed by: INTERNAL MEDICINE

## 2021-08-18 RX ORDER — LISINOPRIL 10 MG/1
10 TABLET ORAL EVERY 24 HOURS
COMMUNITY
End: 2021-12-22 | Stop reason: HOSPADM

## 2021-08-18 RX ORDER — METOPROLOL TARTRATE 100 MG/1
100 TABLET ORAL 2 TIMES DAILY
COMMUNITY
Start: 2021-07-27 | End: 2021-10-25

## 2021-08-18 RX ORDER — ASPIRIN 81 MG/1
81 TABLET ORAL DAILY
COMMUNITY
End: 2022-03-26

## 2021-08-18 RX ORDER — MECLIZINE HYDROCHLORIDE 25 MG/1
TABLET ORAL
COMMUNITY
Start: 2021-04-06 | End: 2021-10-28

## 2021-08-18 RX ORDER — PIOGLITAZONEHYDROCHLORIDE 30 MG/1
30 TABLET ORAL DAILY
COMMUNITY
Start: 2021-07-20 | End: 2021-10-19

## 2021-08-18 RX ORDER — COLCHICINE 0.6 MG/1
0.6 TABLET ORAL 2 TIMES DAILY
Qty: 28 TABLET | Refills: 0 | Status: SHIPPED | OUTPATIENT
Start: 2021-08-18 | End: 2021-09-01

## 2021-08-18 RX ADMIN — CYANOCOBALAMIN 1000 MCG: 1000 INJECTION, SOLUTION INTRAMUSCULAR; SUBCUTANEOUS at 11:23

## 2021-08-23 ENCOUNTER — HOSPITAL ENCOUNTER (OUTPATIENT)
Dept: GENERAL RADIOLOGY | Facility: HOSPITAL | Age: 86
Discharge: HOME | End: 2021-08-23
Admitting: INTERNAL MEDICINE

## 2021-08-23 DIAGNOSIS — M10.041 ACUTE IDIOPATHIC GOUT OF RIGHT HAND: ICD-10-CM

## 2021-08-23 PROCEDURE — 73130 X-RAY EXAM OF HAND: CPT | Performed by: RADIOLOGY

## 2021-08-23 PROCEDURE — 73130 X-RAY EXAM OF HAND: CPT

## 2021-08-23 RX ORDER — FUROSEMIDE 40 MG/1
TABLET ORAL
Qty: 180 TABLET | Refills: 0 | Status: ON HOLD | OUTPATIENT
Start: 2021-08-23 | End: 2021-12-22 | Stop reason: SDUPTHER

## 2021-08-23 RX ORDER — PRAVASTATIN SODIUM 40 MG
TABLET ORAL
Qty: 90 TABLET | Refills: 0 | Status: SHIPPED | OUTPATIENT
Start: 2021-08-23 | End: 2022-03-26

## 2021-09-07 DIAGNOSIS — E11.65 TYPE 2 DIABETES MELLITUS WITH HYPERGLYCEMIA (HCC): ICD-10-CM

## 2021-09-08 DIAGNOSIS — E11.65 TYPE 2 DIABETES MELLITUS WITH HYPERGLYCEMIA, WITH LONG-TERM CURRENT USE OF INSULIN (HCC): Primary | ICD-10-CM

## 2021-09-08 DIAGNOSIS — Z79.4 TYPE 2 DIABETES MELLITUS WITH HYPERGLYCEMIA, WITH LONG-TERM CURRENT USE OF INSULIN (HCC): Primary | ICD-10-CM

## 2021-09-08 RX ORDER — LANCETS 32 GAUGE
EACH MISCELLANEOUS
Qty: 100 EACH | Refills: 5 | Status: ON HOLD | OUTPATIENT
Start: 2021-09-08 | End: 2022-03-21

## 2021-09-14 RX ORDER — FERROUS SULFATE 325(65) MG
TABLET ORAL
Qty: 90 TABLET | Refills: 0 | Status: SHIPPED | OUTPATIENT
Start: 2021-09-14 | End: 2021-12-22 | Stop reason: HOSPADM

## 2021-09-15 ENCOUNTER — CLINICAL SUPPORT (OUTPATIENT)
Dept: INTERNAL MEDICINE | Facility: CLINIC | Age: 86
End: 2021-09-15

## 2021-09-15 DIAGNOSIS — E53.8 VITAMIN B12 DEFICIENCY: ICD-10-CM

## 2021-09-15 PROCEDURE — 96372 THER/PROPH/DIAG INJ SC/IM: CPT | Performed by: INTERNAL MEDICINE

## 2021-09-15 RX ADMIN — CYANOCOBALAMIN 1000 MCG: 1000 INJECTION, SOLUTION INTRAMUSCULAR; SUBCUTANEOUS at 09:01

## 2021-09-17 RX ORDER — INSULIN GLARGINE 100 [IU]/ML
40 INJECTION, SOLUTION SUBCUTANEOUS DAILY
Qty: 12 PEN | Refills: 0 | Status: SHIPPED | OUTPATIENT
Start: 2021-09-17 | End: 2021-10-25

## 2021-10-01 ENCOUNTER — CLINICAL SUPPORT (OUTPATIENT)
Dept: INTERNAL MEDICINE | Facility: CLINIC | Age: 86
End: 2021-10-01

## 2021-10-01 DIAGNOSIS — E53.8 VITAMIN B12 DEFICIENCY: ICD-10-CM

## 2021-10-01 PROCEDURE — 90662 IIV NO PRSV INCREASED AG IM: CPT | Performed by: INTERNAL MEDICINE

## 2021-10-01 PROCEDURE — G0008 ADMIN INFLUENZA VIRUS VAC: HCPCS | Performed by: INTERNAL MEDICINE

## 2021-10-01 PROCEDURE — 96372 THER/PROPH/DIAG INJ SC/IM: CPT | Performed by: INTERNAL MEDICINE

## 2021-10-01 RX ORDER — INFLUENZA A VIRUS A/MICHIGAN/45/2015 X-275 (H1N1) ANTIGEN (FORMALDEHYDE INACTIVATED), INFLUENZA A VIRUS A/SINGAPORE/INFIMH-16-0019/2016 IVR-186 (H3N2) ANTIGEN (FORMALDEHYDE INACTIVATED), INFLUENZA B VIRUS B/PHUKET/3073/2013 ANTIGEN (FORMALDEHYDE INACTIVATED), AND INFLUENZA B VIRUS B/MARYLAND/15/2016 BX-69A ANTIGEN (FORMALDEHYDE INACTIVATED) 60; 60; 60; 60 UG/.7ML; UG/.7ML; UG/.7ML; UG/.7ML
0.7 INJECTION, SUSPENSION INTRAMUSCULAR ONCE
Qty: 0.7 ML | Refills: 0 | Status: SHIPPED | OUTPATIENT
Start: 2021-10-01 | End: 2021-10-01

## 2021-10-01 RX ADMIN — CYANOCOBALAMIN 1000 MCG: 1000 INJECTION, SOLUTION INTRAMUSCULAR; SUBCUTANEOUS at 09:29

## 2021-10-04 RX ORDER — HYDRALAZINE HYDROCHLORIDE 50 MG/1
TABLET, FILM COATED ORAL
Qty: 180 TABLET | Refills: 0 | Status: SHIPPED | OUTPATIENT
Start: 2021-10-04 | End: 2022-03-26

## 2021-10-04 RX ORDER — ISOSORBIDE MONONITRATE 30 MG/1
TABLET, EXTENDED RELEASE ORAL
Qty: 90 TABLET | Refills: 0 | Status: SHIPPED | OUTPATIENT
Start: 2021-10-04 | End: 2022-03-26

## 2021-10-19 RX ORDER — PIOGLITAZONEHYDROCHLORIDE 30 MG/1
TABLET ORAL
Qty: 90 TABLET | Refills: 1 | Status: SHIPPED | OUTPATIENT
Start: 2021-10-19 | End: 2021-11-09 | Stop reason: HOSPADM

## 2021-10-25 RX ORDER — INSULIN GLARGINE 100 [IU]/ML
INJECTION, SOLUTION SUBCUTANEOUS
Qty: 15 ML | Refills: 2 | Status: ON HOLD | OUTPATIENT
Start: 2021-10-25 | End: 2021-12-22 | Stop reason: SDUPTHER

## 2021-10-25 RX ORDER — METOPROLOL TARTRATE 100 MG/1
TABLET ORAL
Qty: 180 TABLET | Refills: 0 | Status: SHIPPED | OUTPATIENT
Start: 2021-10-25 | End: 2021-11-09 | Stop reason: HOSPADM

## 2021-10-28 ENCOUNTER — APPOINTMENT (OUTPATIENT)
Dept: GENERAL RADIOLOGY | Facility: HOSPITAL | Age: 86
End: 2021-10-28

## 2021-10-28 ENCOUNTER — HOSPITAL ENCOUNTER (INPATIENT)
Facility: HOSPITAL | Age: 86
LOS: 12 days | Discharge: NURSING, REHAB OR LONG-TERM CARE | End: 2021-11-09
Attending: EMERGENCY MEDICINE | Admitting: INTERNAL MEDICINE

## 2021-10-28 DIAGNOSIS — Z78.9 DECREASED ACTIVITIES OF DAILY LIVING (ADL): ICD-10-CM

## 2021-10-28 DIAGNOSIS — E53.8 B12 DEFICIENCY: ICD-10-CM

## 2021-10-28 DIAGNOSIS — L97.509 ULCER OF FOOT DUE TO TYPE 2 DIABETES MELLITUS (HCC): ICD-10-CM

## 2021-10-28 DIAGNOSIS — R26.2 DIFFICULTY IN WALKING: ICD-10-CM

## 2021-10-28 DIAGNOSIS — E11.621 ULCER OF FOOT DUE TO TYPE 2 DIABETES MELLITUS (HCC): ICD-10-CM

## 2021-10-28 DIAGNOSIS — I50.9 ACUTE CONGESTIVE HEART FAILURE, UNSPECIFIED HEART FAILURE TYPE (HCC): Primary | ICD-10-CM

## 2021-10-28 LAB
ALBUMIN SERPL-MCNC: 3.4 G/DL (ref 3.5–5.2)
ALBUMIN/GLOB SERPL: 0.9 G/DL
ALP SERPL-CCNC: 79 U/L (ref 39–117)
ALT SERPL W P-5'-P-CCNC: 6 U/L (ref 1–41)
ANION GAP SERPL CALCULATED.3IONS-SCNC: 14.3 MMOL/L (ref 5–15)
AST SERPL-CCNC: 13 U/L (ref 1–40)
BASOPHILS # BLD AUTO: 0.04 10*3/MM3 (ref 0–0.2)
BASOPHILS NFR BLD AUTO: 0.5 % (ref 0–1.5)
BILIRUB SERPL-MCNC: 0.5 MG/DL (ref 0–1.2)
BUN SERPL-MCNC: 54 MG/DL (ref 8–23)
BUN/CREAT SERPL: 23.9 (ref 7–25)
CALCIUM SPEC-SCNC: 9.1 MG/DL (ref 8.6–10.5)
CHLORIDE SERPL-SCNC: 93 MMOL/L (ref 98–107)
CO2 SERPL-SCNC: 23.7 MMOL/L (ref 22–29)
CREAT SERPL-MCNC: 2.26 MG/DL (ref 0.76–1.27)
DEPRECATED RDW RBC AUTO: 53.2 FL (ref 37–54)
EOSINOPHIL # BLD AUTO: 0.08 10*3/MM3 (ref 0–0.4)
EOSINOPHIL NFR BLD AUTO: 1 % (ref 0.3–6.2)
ERYTHROCYTE [DISTWIDTH] IN BLOOD BY AUTOMATED COUNT: 16.5 % (ref 12.3–15.4)
GFR SERPL CREATININE-BSD FRML MDRD: 28 ML/MIN/1.73
GLOBULIN UR ELPH-MCNC: 3.8 GM/DL
GLUCOSE SERPL-MCNC: 292 MG/DL (ref 65–99)
HCT VFR BLD AUTO: 33 % (ref 37.5–51)
HGB BLD-MCNC: 10.5 G/DL (ref 13–17.7)
HOLD SPECIMEN: NORMAL
HOLD SPECIMEN: NORMAL
IMM GRANULOCYTES # BLD AUTO: 0.03 10*3/MM3 (ref 0–0.05)
IMM GRANULOCYTES NFR BLD AUTO: 0.4 % (ref 0–0.5)
LYMPHOCYTES # BLD AUTO: 1.46 10*3/MM3 (ref 0.7–3.1)
LYMPHOCYTES NFR BLD AUTO: 18.6 % (ref 19.6–45.3)
MCH RBC QN AUTO: 27.8 PG (ref 26.6–33)
MCHC RBC AUTO-ENTMCNC: 31.8 G/DL (ref 31.5–35.7)
MCV RBC AUTO: 87.3 FL (ref 79–97)
MONOCYTES # BLD AUTO: 0.73 10*3/MM3 (ref 0.1–0.9)
MONOCYTES NFR BLD AUTO: 9.3 % (ref 5–12)
NEUTROPHILS NFR BLD AUTO: 5.5 10*3/MM3 (ref 1.7–7)
NEUTROPHILS NFR BLD AUTO: 70.2 % (ref 42.7–76)
NRBC BLD AUTO-RTO: 0 /100 WBC (ref 0–0.2)
NT-PROBNP SERPL-MCNC: ABNORMAL PG/ML (ref 0–1800)
PLATELET # BLD AUTO: 262 10*3/MM3 (ref 140–450)
PMV BLD AUTO: 9.1 FL (ref 6–12)
POTASSIUM SERPL-SCNC: 4.3 MMOL/L (ref 3.5–5.2)
PROT SERPL-MCNC: 7.2 G/DL (ref 6–8.5)
RBC # BLD AUTO: 3.78 10*6/MM3 (ref 4.14–5.8)
SODIUM SERPL-SCNC: 131 MMOL/L (ref 136–145)
TROPONIN T SERPL-MCNC: 0.04 NG/ML (ref 0–0.03)
WBC # BLD AUTO: 7.84 10*3/MM3 (ref 3.4–10.8)
WHOLE BLOOD HOLD SPECIMEN: NORMAL
WHOLE BLOOD HOLD SPECIMEN: NORMAL

## 2021-10-28 PROCEDURE — 83880 ASSAY OF NATRIURETIC PEPTIDE: CPT | Performed by: EMERGENCY MEDICINE

## 2021-10-28 PROCEDURE — 99284 EMERGENCY DEPT VISIT MOD MDM: CPT

## 2021-10-28 PROCEDURE — 84484 ASSAY OF TROPONIN QUANT: CPT | Performed by: EMERGENCY MEDICINE

## 2021-10-28 PROCEDURE — 25010000002 FUROSEMIDE PER 20 MG: Performed by: EMERGENCY MEDICINE

## 2021-10-28 PROCEDURE — 85025 COMPLETE CBC W/AUTO DIFF WBC: CPT | Performed by: EMERGENCY MEDICINE

## 2021-10-28 PROCEDURE — 93005 ELECTROCARDIOGRAM TRACING: CPT | Performed by: EMERGENCY MEDICINE

## 2021-10-28 PROCEDURE — 80053 COMPREHEN METABOLIC PANEL: CPT | Performed by: EMERGENCY MEDICINE

## 2021-10-28 PROCEDURE — 71045 X-RAY EXAM CHEST 1 VIEW: CPT

## 2021-10-28 PROCEDURE — 36415 COLL VENOUS BLD VENIPUNCTURE: CPT

## 2021-10-28 RX ORDER — FUROSEMIDE 10 MG/ML
40 INJECTION INTRAMUSCULAR; INTRAVENOUS ONCE
Status: COMPLETED | OUTPATIENT
Start: 2021-10-28 | End: 2021-10-28

## 2021-10-28 RX ORDER — ACETAMINOPHEN 650 MG/1
650 SUPPOSITORY RECTAL EVERY 4 HOURS PRN
Status: DISCONTINUED | OUTPATIENT
Start: 2021-10-28 | End: 2021-11-09 | Stop reason: HOSPADM

## 2021-10-28 RX ORDER — METOPROLOL TARTRATE 50 MG/1
100 TABLET, FILM COATED ORAL 2 TIMES DAILY
Status: DISCONTINUED | OUTPATIENT
Start: 2021-10-29 | End: 2021-11-05

## 2021-10-28 RX ORDER — ASPIRIN 81 MG/1
81 TABLET ORAL DAILY
Status: DISCONTINUED | OUTPATIENT
Start: 2021-10-29 | End: 2021-11-09 | Stop reason: HOSPADM

## 2021-10-28 RX ORDER — NITROGLYCERIN 0.4 MG/1
0.4 TABLET SUBLINGUAL
Status: DISCONTINUED | OUTPATIENT
Start: 2021-10-28 | End: 2021-11-09 | Stop reason: HOSPADM

## 2021-10-28 RX ORDER — HYDRALAZINE HYDROCHLORIDE 50 MG/1
50 TABLET, FILM COATED ORAL 3 TIMES DAILY
Status: DISCONTINUED | OUTPATIENT
Start: 2021-10-29 | End: 2021-11-09 | Stop reason: HOSPADM

## 2021-10-28 RX ORDER — DEXTROSE MONOHYDRATE 100 MG/ML
25 INJECTION, SOLUTION INTRAVENOUS
Status: DISCONTINUED | OUTPATIENT
Start: 2021-10-28 | End: 2021-11-09 | Stop reason: HOSPADM

## 2021-10-28 RX ORDER — ACETAMINOPHEN 325 MG/1
650 TABLET ORAL EVERY 4 HOURS PRN
Status: DISCONTINUED | OUTPATIENT
Start: 2021-10-28 | End: 2021-11-09 | Stop reason: HOSPADM

## 2021-10-28 RX ORDER — ONDANSETRON 2 MG/ML
4 INJECTION INTRAMUSCULAR; INTRAVENOUS EVERY 6 HOURS PRN
Status: DISCONTINUED | OUTPATIENT
Start: 2021-10-28 | End: 2021-11-09 | Stop reason: HOSPADM

## 2021-10-28 RX ORDER — CYANOCOBALAMIN 1000 UG/ML
1000 INJECTION, SOLUTION INTRAMUSCULAR; SUBCUTANEOUS
Status: DISCONTINUED | OUTPATIENT
Start: 2021-10-29 | End: 2021-11-09 | Stop reason: HOSPADM

## 2021-10-28 RX ORDER — FUROSEMIDE 10 MG/ML
40 INJECTION INTRAMUSCULAR; INTRAVENOUS EVERY 8 HOURS
Status: DISCONTINUED | OUTPATIENT
Start: 2021-10-29 | End: 2021-10-31

## 2021-10-28 RX ORDER — SODIUM CHLORIDE 0.9 % (FLUSH) 0.9 %
10 SYRINGE (ML) INJECTION EVERY 12 HOURS SCHEDULED
Status: DISCONTINUED | OUTPATIENT
Start: 2021-10-29 | End: 2021-11-09 | Stop reason: HOSPADM

## 2021-10-28 RX ORDER — HEPARIN SODIUM 5000 [USP'U]/ML
5000 INJECTION, SOLUTION INTRAVENOUS; SUBCUTANEOUS EVERY 8 HOURS SCHEDULED
Status: DISCONTINUED | OUTPATIENT
Start: 2021-10-29 | End: 2021-11-09 | Stop reason: HOSPADM

## 2021-10-28 RX ORDER — SODIUM CHLORIDE 0.9 % (FLUSH) 0.9 %
10 SYRINGE (ML) INJECTION AS NEEDED
Status: DISCONTINUED | OUTPATIENT
Start: 2021-10-28 | End: 2021-11-09 | Stop reason: HOSPADM

## 2021-10-28 RX ORDER — NICOTINE POLACRILEX 4 MG
15 LOZENGE BUCCAL
Status: DISCONTINUED | OUTPATIENT
Start: 2021-10-28 | End: 2021-11-09 | Stop reason: HOSPADM

## 2021-10-28 RX ORDER — ACETAMINOPHEN 160 MG/5ML
650 SOLUTION ORAL EVERY 4 HOURS PRN
Status: DISCONTINUED | OUTPATIENT
Start: 2021-10-28 | End: 2021-11-09 | Stop reason: HOSPADM

## 2021-10-28 RX ORDER — ISOSORBIDE MONONITRATE 30 MG/1
30 TABLET, EXTENDED RELEASE ORAL EVERY MORNING
Status: DISCONTINUED | OUTPATIENT
Start: 2021-10-29 | End: 2021-11-09 | Stop reason: HOSPADM

## 2021-10-28 RX ADMIN — FUROSEMIDE 40 MG: 10 INJECTION, SOLUTION INTRAMUSCULAR; INTRAVENOUS at 21:04

## 2021-10-29 ENCOUNTER — APPOINTMENT (OUTPATIENT)
Dept: CARDIOLOGY | Facility: HOSPITAL | Age: 86
End: 2021-10-29

## 2021-10-29 LAB
25(OH)D3 SERPL-MCNC: 48.4 NG/ML (ref 30–100)
ANION GAP SERPL CALCULATED.3IONS-SCNC: 15.6 MMOL/L (ref 5–15)
ASCENDING AORTA: 3.6 CM
BASOPHILS # BLD AUTO: 0.03 10*3/MM3 (ref 0–0.2)
BASOPHILS NFR BLD AUTO: 0.4 % (ref 0–1.5)
BH CV ECHO MEAS - AO ROOT DIAM: 3.7 CM
BH CV ECHO MEAS - EDV(MOD-SP2): 125 ML
BH CV ECHO MEAS - EDV(MOD-SP4): 160 ML
BH CV ECHO MEAS - EF(MOD-BP): 37 %
BH CV ECHO MEAS - ESV(MOD-SP2): 82 ML
BH CV ECHO MEAS - ESV(MOD-SP4): 100 ML
BH CV ECHO MEAS - IVSD: 1.2 CM
BH CV ECHO MEAS - LA DIMENSION(2D): 4 CM
BH CV ECHO MEAS - LAT PEAK E' VEL: 5 CM/SEC
BH CV ECHO MEAS - LVIDD: 5.7 CM
BH CV ECHO MEAS - LVIDS: 5 CM
BH CV ECHO MEAS - LVOT DIAM: 2 CM
BH CV ECHO MEAS - LVPWD: 0.9 CM
BH CV ECHO MEAS - MED PEAK E' VEL: 3.37 CM/SEC
BH CV ECHO MEAS - MV A MAX VEL: 90 CM/SEC
BH CV ECHO MEAS - MV DEC TIME: 165 MSEC
BH CV ECHO MEAS - MV E MAX VEL: 61 CM/SEC
BH CV ECHO MEAS - MV E/A: 0.7
BH CV ECHO MEAS - RAP SYSTOLE: 3 MMHG
BH CV ECHO MEAS - RVDD: 2.7 CM
BH CV ECHO MEAS - RVSP: 21 MMHG
BH CV ECHO MEAS - TR MAX PG: 18 MMHG
BH CV ECHO MEAS - TR MAX VEL: 213 CM/SEC
BH CV ECHO MEASUREMENTS AVERAGE E/E' RATIO: 14.58
BUN SERPL-MCNC: 52 MG/DL (ref 8–23)
BUN/CREAT SERPL: 26 (ref 7–25)
CALCIUM SPEC-SCNC: 9.3 MG/DL (ref 8.6–10.5)
CHLORIDE SERPL-SCNC: 96 MMOL/L (ref 98–107)
CHOLEST SERPL-MCNC: 112 MG/DL (ref 0–200)
CO2 SERPL-SCNC: 23.4 MMOL/L (ref 22–29)
CREAT SERPL-MCNC: 2 MG/DL (ref 0.76–1.27)
DEPRECATED RDW RBC AUTO: 53 FL (ref 37–54)
EOSINOPHIL # BLD AUTO: 0.18 10*3/MM3 (ref 0–0.4)
EOSINOPHIL NFR BLD AUTO: 2.5 % (ref 0.3–6.2)
ERYTHROCYTE [DISTWIDTH] IN BLOOD BY AUTOMATED COUNT: 16.5 % (ref 12.3–15.4)
FOLATE SERPL-MCNC: 7.35 NG/ML (ref 4.78–24.2)
GFR SERPL CREATININE-BSD FRML MDRD: 32 ML/MIN/1.73
GLUCOSE BLDC GLUCOMTR-MCNC: 128 MG/DL (ref 70–99)
GLUCOSE BLDC GLUCOMTR-MCNC: 136 MG/DL (ref 70–99)
GLUCOSE BLDC GLUCOMTR-MCNC: 166 MG/DL (ref 70–99)
GLUCOSE BLDC GLUCOMTR-MCNC: 220 MG/DL (ref 70–99)
GLUCOSE BLDC GLUCOMTR-MCNC: 228 MG/DL (ref 70–99)
GLUCOSE SERPL-MCNC: 130 MG/DL (ref 65–99)
HBA1C MFR BLD: 8.1 % (ref 4.8–5.6)
HCT VFR BLD AUTO: 35.1 % (ref 37.5–51)
HDLC SERPL-MCNC: 41 MG/DL (ref 40–60)
HGB BLD-MCNC: 11.1 G/DL (ref 13–17.7)
IMM GRANULOCYTES # BLD AUTO: 0.03 10*3/MM3 (ref 0–0.05)
IMM GRANULOCYTES NFR BLD AUTO: 0.4 % (ref 0–0.5)
IRON 24H UR-MRATE: 30 MCG/DL (ref 59–158)
IRON SATN MFR SERPL: 12 % (ref 20–50)
IVRT: 95 MSEC
LDLC SERPL CALC-MCNC: 48 MG/DL (ref 0–100)
LDLC/HDLC SERPL: 1.11 {RATIO}
LEFT ATRIUM VOLUME INDEX: 46.1 ML/M2
LYMPHOCYTES # BLD AUTO: 1.6 10*3/MM3 (ref 0.7–3.1)
LYMPHOCYTES NFR BLD AUTO: 22.2 % (ref 19.6–45.3)
MAGNESIUM SERPL-MCNC: 1.8 MG/DL (ref 1.6–2.4)
MAXIMAL PREDICTED HEART RATE: 134 BPM
MCH RBC QN AUTO: 27.8 PG (ref 26.6–33)
MCHC RBC AUTO-ENTMCNC: 31.6 G/DL (ref 31.5–35.7)
MCV RBC AUTO: 87.8 FL (ref 79–97)
MONOCYTES # BLD AUTO: 0.64 10*3/MM3 (ref 0.1–0.9)
MONOCYTES NFR BLD AUTO: 8.9 % (ref 5–12)
NEUTROPHILS NFR BLD AUTO: 4.72 10*3/MM3 (ref 1.7–7)
NEUTROPHILS NFR BLD AUTO: 65.6 % (ref 42.7–76)
NRBC BLD AUTO-RTO: 0 /100 WBC (ref 0–0.2)
PLATELET # BLD AUTO: 275 10*3/MM3 (ref 140–450)
PMV BLD AUTO: 9.1 FL (ref 6–12)
POTASSIUM SERPL-SCNC: 3.6 MMOL/L (ref 3.5–5.2)
QT INTERVAL: 420 MS
RBC # BLD AUTO: 4 10*6/MM3 (ref 4.14–5.8)
SARS-COV-2 N GENE RESP QL NAA+PROBE: NOT DETECTED
SODIUM SERPL-SCNC: 135 MMOL/L (ref 136–145)
STRESS TARGET HR: 114 BPM
TIBC SERPL-MCNC: 241 MCG/DL (ref 298–536)
TRANSFERRIN SERPL-MCNC: 162 MG/DL (ref 200–360)
TRIGL SERPL-MCNC: 127 MG/DL (ref 0–150)
TROPONIN T SERPL-MCNC: 0.04 NG/ML (ref 0–0.03)
TSH SERPL DL<=0.05 MIU/L-ACNC: 1.96 UIU/ML (ref 0.27–4.2)
VIT B12 BLD-MCNC: 676 PG/ML (ref 211–946)
VLDLC SERPL-MCNC: 23 MG/DL (ref 5–40)
WBC # BLD AUTO: 7.2 10*3/MM3 (ref 3.4–10.8)

## 2021-10-29 PROCEDURE — 83036 HEMOGLOBIN GLYCOSYLATED A1C: CPT | Performed by: INTERNAL MEDICINE

## 2021-10-29 PROCEDURE — 25010000002 IRON SUCROSE PER 1 MG: Performed by: INTERNAL MEDICINE

## 2021-10-29 PROCEDURE — 25010000002 HEPARIN (PORCINE) PER 1000 UNITS: Performed by: INTERNAL MEDICINE

## 2021-10-29 PROCEDURE — 83540 ASSAY OF IRON: CPT | Performed by: INTERNAL MEDICINE

## 2021-10-29 PROCEDURE — 94799 UNLISTED PULMONARY SVC/PX: CPT

## 2021-10-29 PROCEDURE — 25010000002 CYANOCOBALAMIN PER 1000 MCG: Performed by: INTERNAL MEDICINE

## 2021-10-29 PROCEDURE — 84484 ASSAY OF TROPONIN QUANT: CPT | Performed by: INTERNAL MEDICINE

## 2021-10-29 PROCEDURE — 82962 GLUCOSE BLOOD TEST: CPT

## 2021-10-29 PROCEDURE — 80048 BASIC METABOLIC PNL TOTAL CA: CPT | Performed by: INTERNAL MEDICINE

## 2021-10-29 PROCEDURE — 84443 ASSAY THYROID STIM HORMONE: CPT | Performed by: INTERNAL MEDICINE

## 2021-10-29 PROCEDURE — 82306 VITAMIN D 25 HYDROXY: CPT | Performed by: INTERNAL MEDICINE

## 2021-10-29 PROCEDURE — 82607 VITAMIN B-12: CPT | Performed by: INTERNAL MEDICINE

## 2021-10-29 PROCEDURE — 83735 ASSAY OF MAGNESIUM: CPT | Performed by: INTERNAL MEDICINE

## 2021-10-29 PROCEDURE — 99223 1ST HOSP IP/OBS HIGH 75: CPT | Performed by: INTERNAL MEDICINE

## 2021-10-29 PROCEDURE — 63710000001 INSULIN LISPRO (HUMAN) PER 5 UNITS: Performed by: INTERNAL MEDICINE

## 2021-10-29 PROCEDURE — 93306 TTE W/DOPPLER COMPLETE: CPT

## 2021-10-29 PROCEDURE — 25010000002 FUROSEMIDE PER 20 MG: Performed by: INTERNAL MEDICINE

## 2021-10-29 PROCEDURE — 87635 SARS-COV-2 COVID-19 AMP PRB: CPT | Performed by: INTERNAL MEDICINE

## 2021-10-29 PROCEDURE — 36415 COLL VENOUS BLD VENIPUNCTURE: CPT | Performed by: INTERNAL MEDICINE

## 2021-10-29 PROCEDURE — 63710000001 INSULIN DETEMIR PER 5 UNITS: Performed by: INTERNAL MEDICINE

## 2021-10-29 PROCEDURE — 25010000002 SULFUR HEXAFLUORIDE MICROSPH 60.7-25 MG RECONSTITUTED SUSPENSION: Performed by: INTERNAL MEDICINE

## 2021-10-29 PROCEDURE — 82746 ASSAY OF FOLIC ACID SERUM: CPT | Performed by: INTERNAL MEDICINE

## 2021-10-29 PROCEDURE — 84466 ASSAY OF TRANSFERRIN: CPT | Performed by: INTERNAL MEDICINE

## 2021-10-29 PROCEDURE — 80061 LIPID PANEL: CPT | Performed by: INTERNAL MEDICINE

## 2021-10-29 PROCEDURE — 85025 COMPLETE CBC W/AUTO DIFF WBC: CPT | Performed by: INTERNAL MEDICINE

## 2021-10-29 RX ORDER — FAMOTIDINE 20 MG/1
20 TABLET, FILM COATED ORAL NIGHTLY
Status: DISCONTINUED | OUTPATIENT
Start: 2021-10-29 | End: 2021-11-09 | Stop reason: HOSPADM

## 2021-10-29 RX ORDER — ESCITALOPRAM OXALATE 10 MG/1
5 TABLET ORAL DAILY
Status: DISCONTINUED | OUTPATIENT
Start: 2021-10-30 | End: 2021-11-09 | Stop reason: HOSPADM

## 2021-10-29 RX ORDER — POTASSIUM CHLORIDE 750 MG/1
40 CAPSULE, EXTENDED RELEASE ORAL ONCE
Status: COMPLETED | OUTPATIENT
Start: 2021-10-29 | End: 2021-10-29

## 2021-10-29 RX ORDER — POTASSIUM CHLORIDE 750 MG/1
20 CAPSULE, EXTENDED RELEASE ORAL 3 TIMES DAILY
Status: DISCONTINUED | OUTPATIENT
Start: 2021-10-29 | End: 2021-10-31

## 2021-10-29 RX ORDER — GABAPENTIN 100 MG/1
100 CAPSULE ORAL 3 TIMES DAILY
Status: DISCONTINUED | OUTPATIENT
Start: 2021-10-29 | End: 2021-11-09 | Stop reason: HOSPADM

## 2021-10-29 RX ADMIN — POTASSIUM CHLORIDE 40 MEQ: 750 CAPSULE, EXTENDED RELEASE ORAL at 18:36

## 2021-10-29 RX ADMIN — IRON SUCROSE 200 MG: 20 INJECTION, SOLUTION INTRAVENOUS at 19:01

## 2021-10-29 RX ADMIN — GABAPENTIN 100 MG: 100 CAPSULE ORAL at 21:10

## 2021-10-29 RX ADMIN — SODIUM CHLORIDE, PRESERVATIVE FREE 10 ML: 5 INJECTION INTRAVENOUS at 00:38

## 2021-10-29 RX ADMIN — METOPROLOL TARTRATE 100 MG: 50 TABLET, FILM COATED ORAL at 00:28

## 2021-10-29 RX ADMIN — HEPARIN SODIUM 5000 UNITS: 5000 INJECTION, SOLUTION INTRAVENOUS; SUBCUTANEOUS at 13:26

## 2021-10-29 RX ADMIN — SODIUM CHLORIDE, PRESERVATIVE FREE 10 ML: 5 INJECTION INTRAVENOUS at 10:00

## 2021-10-29 RX ADMIN — INSULIN LISPRO 4 UNITS: 100 INJECTION, SOLUTION INTRAVENOUS; SUBCUTANEOUS at 12:26

## 2021-10-29 RX ADMIN — HYDRALAZINE HYDROCHLORIDE 50 MG: 50 TABLET, FILM COATED ORAL at 21:41

## 2021-10-29 RX ADMIN — POTASSIUM CHLORIDE 20 MEQ: 750 CAPSULE, EXTENDED RELEASE ORAL at 21:10

## 2021-10-29 RX ADMIN — SULFUR HEXAFLUORIDE 5 ML: KIT at 13:56

## 2021-10-29 RX ADMIN — HEPARIN SODIUM 5000 UNITS: 5000 INJECTION, SOLUTION INTRAVENOUS; SUBCUTANEOUS at 06:06

## 2021-10-29 RX ADMIN — SODIUM CHLORIDE, PRESERVATIVE FREE 10 ML: 5 INJECTION INTRAVENOUS at 21:11

## 2021-10-29 RX ADMIN — HYDRALAZINE HYDROCHLORIDE 50 MG: 50 TABLET, FILM COATED ORAL at 09:59

## 2021-10-29 RX ADMIN — FAMOTIDINE 20 MG: 20 TABLET, FILM COATED ORAL at 21:10

## 2021-10-29 RX ADMIN — FUROSEMIDE 40 MG: 10 INJECTION, SOLUTION INTRAMUSCULAR; INTRAVENOUS at 13:26

## 2021-10-29 RX ADMIN — INSULIN DETEMIR 30 UNITS: 100 INJECTION, SOLUTION SUBCUTANEOUS at 21:40

## 2021-10-29 RX ADMIN — HYDRALAZINE HYDROCHLORIDE 50 MG: 50 TABLET, FILM COATED ORAL at 17:13

## 2021-10-29 RX ADMIN — FUROSEMIDE 40 MG: 10 INJECTION, SOLUTION INTRAMUSCULAR; INTRAVENOUS at 21:10

## 2021-10-29 RX ADMIN — HEPARIN SODIUM 5000 UNITS: 5000 INJECTION, SOLUTION INTRAVENOUS; SUBCUTANEOUS at 21:11

## 2021-10-29 RX ADMIN — ACETAMINOPHEN 650 MG: 325 TABLET ORAL at 01:41

## 2021-10-29 RX ADMIN — HEPARIN SODIUM 5000 UNITS: 5000 INJECTION, SOLUTION INTRAVENOUS; SUBCUTANEOUS at 00:13

## 2021-10-29 RX ADMIN — METOPROLOL TARTRATE 100 MG: 50 TABLET, FILM COATED ORAL at 09:59

## 2021-10-29 RX ADMIN — FUROSEMIDE 40 MG: 10 INJECTION, SOLUTION INTRAMUSCULAR; INTRAVENOUS at 04:10

## 2021-10-29 RX ADMIN — INSULIN DETEMIR 30 UNITS: 100 INJECTION, SOLUTION SUBCUTANEOUS at 00:27

## 2021-10-29 RX ADMIN — ASPIRIN 81 MG: 81 TABLET, COATED ORAL at 09:59

## 2021-10-29 RX ADMIN — ACETAMINOPHEN 650 MG: 325 TABLET ORAL at 15:42

## 2021-10-29 RX ADMIN — INSULIN LISPRO 2 UNITS: 100 INJECTION, SOLUTION INTRAVENOUS; SUBCUTANEOUS at 17:13

## 2021-10-29 RX ADMIN — HYDRALAZINE HYDROCHLORIDE 50 MG: 50 TABLET, FILM COATED ORAL at 00:23

## 2021-10-29 RX ADMIN — METOPROLOL TARTRATE 100 MG: 50 TABLET, FILM COATED ORAL at 21:10

## 2021-10-29 RX ADMIN — ISOSORBIDE MONONITRATE 30 MG: 30 TABLET ORAL at 06:06

## 2021-10-29 RX ADMIN — CYANOCOBALAMIN 1000 MCG: 1000 INJECTION, SOLUTION INTRAMUSCULAR at 09:59

## 2021-10-30 LAB
ANION GAP SERPL CALCULATED.3IONS-SCNC: 12.5 MMOL/L (ref 5–15)
BILIRUB UR QL STRIP: NEGATIVE
BUN SERPL-MCNC: 55 MG/DL (ref 8–23)
BUN/CREAT SERPL: 27.2 (ref 7–25)
CALCIUM SPEC-SCNC: 9.2 MG/DL (ref 8.6–10.5)
CHLORIDE SERPL-SCNC: 96 MMOL/L (ref 98–107)
CLARITY UR: CLEAR
CO2 SERPL-SCNC: 25.5 MMOL/L (ref 22–29)
COLOR UR: YELLOW
CREAT SERPL-MCNC: 2.02 MG/DL (ref 0.76–1.27)
GFR SERPL CREATININE-BSD FRML MDRD: 31 ML/MIN/1.73
GLUCOSE BLDC GLUCOMTR-MCNC: 123 MG/DL (ref 70–99)
GLUCOSE BLDC GLUCOMTR-MCNC: 168 MG/DL (ref 70–99)
GLUCOSE BLDC GLUCOMTR-MCNC: 172 MG/DL (ref 70–99)
GLUCOSE BLDC GLUCOMTR-MCNC: 240 MG/DL (ref 70–99)
GLUCOSE SERPL-MCNC: 139 MG/DL (ref 65–99)
GLUCOSE UR STRIP-MCNC: NEGATIVE MG/DL
HGB UR QL STRIP.AUTO: NEGATIVE
KETONES UR QL STRIP: NEGATIVE
L PNEUMO1 AG UR QL IA: NEGATIVE
LEUKOCYTE ESTERASE UR QL STRIP.AUTO: NEGATIVE
NITRITE UR QL STRIP: NEGATIVE
PH UR STRIP.AUTO: 6 [PH] (ref 5–8)
POTASSIUM SERPL-SCNC: 4.1 MMOL/L (ref 3.5–5.2)
PROT UR QL STRIP: NEGATIVE
S PNEUM AG SPEC QL LA: NEGATIVE
SODIUM SERPL-SCNC: 134 MMOL/L (ref 136–145)
SP GR UR STRIP: 1.01 (ref 1–1.03)
UROBILINOGEN UR QL STRIP: NORMAL

## 2021-10-30 PROCEDURE — 25010000002 HEPARIN (PORCINE) PER 1000 UNITS: Performed by: INTERNAL MEDICINE

## 2021-10-30 PROCEDURE — 97165 OT EVAL LOW COMPLEX 30 MIN: CPT

## 2021-10-30 PROCEDURE — 25010000002 FUROSEMIDE PER 20 MG: Performed by: INTERNAL MEDICINE

## 2021-10-30 PROCEDURE — 63710000001 INSULIN LISPRO (HUMAN) PER 5 UNITS: Performed by: INTERNAL MEDICINE

## 2021-10-30 PROCEDURE — 25010000002 CEFTRIAXONE PER 250 MG: Performed by: INTERNAL MEDICINE

## 2021-10-30 PROCEDURE — 82962 GLUCOSE BLOOD TEST: CPT

## 2021-10-30 PROCEDURE — 81003 URINALYSIS AUTO W/O SCOPE: CPT | Performed by: INTERNAL MEDICINE

## 2021-10-30 PROCEDURE — 25010000002 IRON SUCROSE PER 1 MG: Performed by: INTERNAL MEDICINE

## 2021-10-30 PROCEDURE — 87899 AGENT NOS ASSAY W/OPTIC: CPT | Performed by: INTERNAL MEDICINE

## 2021-10-30 PROCEDURE — 63710000001 INSULIN DETEMIR PER 5 UNITS: Performed by: INTERNAL MEDICINE

## 2021-10-30 PROCEDURE — 80048 BASIC METABOLIC PNL TOTAL CA: CPT | Performed by: INTERNAL MEDICINE

## 2021-10-30 PROCEDURE — 99233 SBSQ HOSP IP/OBS HIGH 50: CPT | Performed by: INTERNAL MEDICINE

## 2021-10-30 RX ORDER — CEFTRIAXONE SODIUM 1 G/50ML
1 INJECTION, SOLUTION INTRAVENOUS EVERY 24 HOURS
Status: COMPLETED | OUTPATIENT
Start: 2021-10-30 | End: 2021-11-05

## 2021-10-30 RX ADMIN — METOPROLOL TARTRATE 100 MG: 50 TABLET, FILM COATED ORAL at 08:12

## 2021-10-30 RX ADMIN — FAMOTIDINE 20 MG: 20 TABLET, FILM COATED ORAL at 20:32

## 2021-10-30 RX ADMIN — SODIUM CHLORIDE, PRESERVATIVE FREE 10 ML: 5 INJECTION INTRAVENOUS at 08:12

## 2021-10-30 RX ADMIN — POTASSIUM CHLORIDE 20 MEQ: 750 CAPSULE, EXTENDED RELEASE ORAL at 20:32

## 2021-10-30 RX ADMIN — HYDRALAZINE HYDROCHLORIDE 50 MG: 50 TABLET, FILM COATED ORAL at 20:50

## 2021-10-30 RX ADMIN — GABAPENTIN 100 MG: 100 CAPSULE ORAL at 08:12

## 2021-10-30 RX ADMIN — ASPIRIN 81 MG: 81 TABLET, COATED ORAL at 08:11

## 2021-10-30 RX ADMIN — ESCITALOPRAM OXALATE 5 MG: 10 TABLET ORAL at 08:12

## 2021-10-30 RX ADMIN — HYDRALAZINE HYDROCHLORIDE 50 MG: 50 TABLET, FILM COATED ORAL at 08:12

## 2021-10-30 RX ADMIN — CEFTRIAXONE SODIUM 1 G: 1 INJECTION, SOLUTION INTRAVENOUS at 16:55

## 2021-10-30 RX ADMIN — HEPARIN SODIUM 5000 UNITS: 5000 INJECTION, SOLUTION INTRAVENOUS; SUBCUTANEOUS at 22:16

## 2021-10-30 RX ADMIN — POTASSIUM CHLORIDE 20 MEQ: 750 CAPSULE, EXTENDED RELEASE ORAL at 16:55

## 2021-10-30 RX ADMIN — INSULIN DETEMIR 30 UNITS: 100 INJECTION, SOLUTION SUBCUTANEOUS at 20:49

## 2021-10-30 RX ADMIN — INSULIN LISPRO 4 UNITS: 100 INJECTION, SOLUTION INTRAVENOUS; SUBCUTANEOUS at 12:27

## 2021-10-30 RX ADMIN — INSULIN LISPRO 2 UNITS: 100 INJECTION, SOLUTION INTRAVENOUS; SUBCUTANEOUS at 16:56

## 2021-10-30 RX ADMIN — FUROSEMIDE 40 MG: 10 INJECTION, SOLUTION INTRAMUSCULAR; INTRAVENOUS at 20:32

## 2021-10-30 RX ADMIN — METOPROLOL TARTRATE 100 MG: 50 TABLET, FILM COATED ORAL at 20:32

## 2021-10-30 RX ADMIN — FUROSEMIDE 40 MG: 10 INJECTION, SOLUTION INTRAMUSCULAR; INTRAVENOUS at 13:44

## 2021-10-30 RX ADMIN — IRON SUCROSE 200 MG: 20 INJECTION, SOLUTION INTRAVENOUS at 18:27

## 2021-10-30 RX ADMIN — SODIUM CHLORIDE, PRESERVATIVE FREE 10 ML: 5 INJECTION INTRAVENOUS at 20:32

## 2021-10-30 RX ADMIN — POTASSIUM CHLORIDE 20 MEQ: 750 CAPSULE, EXTENDED RELEASE ORAL at 08:12

## 2021-10-30 RX ADMIN — HEPARIN SODIUM 5000 UNITS: 5000 INJECTION, SOLUTION INTRAVENOUS; SUBCUTANEOUS at 13:44

## 2021-10-30 RX ADMIN — GABAPENTIN 100 MG: 100 CAPSULE ORAL at 16:56

## 2021-10-30 RX ADMIN — HEPARIN SODIUM 5000 UNITS: 5000 INJECTION, SOLUTION INTRAVENOUS; SUBCUTANEOUS at 05:45

## 2021-10-30 RX ADMIN — ISOSORBIDE MONONITRATE 30 MG: 30 TABLET ORAL at 08:11

## 2021-10-30 RX ADMIN — GABAPENTIN 100 MG: 100 CAPSULE ORAL at 20:31

## 2021-10-30 RX ADMIN — HYDRALAZINE HYDROCHLORIDE 50 MG: 50 TABLET, FILM COATED ORAL at 17:21

## 2021-10-30 RX ADMIN — FUROSEMIDE 40 MG: 10 INJECTION, SOLUTION INTRAMUSCULAR; INTRAVENOUS at 05:45

## 2021-10-31 LAB
ANION GAP SERPL CALCULATED.3IONS-SCNC: 13.2 MMOL/L (ref 5–15)
BUN SERPL-MCNC: 60 MG/DL (ref 8–23)
BUN/CREAT SERPL: 27.6 (ref 7–25)
CALCIUM SPEC-SCNC: 9.1 MG/DL (ref 8.6–10.5)
CHLORIDE SERPL-SCNC: 96 MMOL/L (ref 98–107)
CO2 SERPL-SCNC: 25.8 MMOL/L (ref 22–29)
CREAT SERPL-MCNC: 2.17 MG/DL (ref 0.76–1.27)
GFR SERPL CREATININE-BSD FRML MDRD: 29 ML/MIN/1.73
GLUCOSE BLDC GLUCOMTR-MCNC: 150 MG/DL (ref 70–99)
GLUCOSE BLDC GLUCOMTR-MCNC: 201 MG/DL (ref 70–99)
GLUCOSE BLDC GLUCOMTR-MCNC: 212 MG/DL (ref 70–99)
GLUCOSE BLDC GLUCOMTR-MCNC: 247 MG/DL (ref 70–99)
GLUCOSE SERPL-MCNC: 165 MG/DL (ref 65–99)
POTASSIUM SERPL-SCNC: 4.5 MMOL/L (ref 3.5–5.2)
SODIUM SERPL-SCNC: 135 MMOL/L (ref 136–145)
URATE SERPL-MCNC: 14.1 MG/DL (ref 3.4–7)

## 2021-10-31 PROCEDURE — 25010000002 HEPARIN (PORCINE) PER 1000 UNITS: Performed by: INTERNAL MEDICINE

## 2021-10-31 PROCEDURE — 25010000002 FUROSEMIDE PER 20 MG: Performed by: INTERNAL MEDICINE

## 2021-10-31 PROCEDURE — 80048 BASIC METABOLIC PNL TOTAL CA: CPT | Performed by: INTERNAL MEDICINE

## 2021-10-31 PROCEDURE — 94760 N-INVAS EAR/PLS OXIMETRY 1: CPT

## 2021-10-31 PROCEDURE — 25010000002 IRON SUCROSE PER 1 MG: Performed by: INTERNAL MEDICINE

## 2021-10-31 PROCEDURE — 63710000001 INSULIN DETEMIR PER 5 UNITS: Performed by: INTERNAL MEDICINE

## 2021-10-31 PROCEDURE — 25010000002 CEFTRIAXONE PER 250 MG: Performed by: INTERNAL MEDICINE

## 2021-10-31 PROCEDURE — 82962 GLUCOSE BLOOD TEST: CPT

## 2021-10-31 PROCEDURE — 99233 SBSQ HOSP IP/OBS HIGH 50: CPT | Performed by: INTERNAL MEDICINE

## 2021-10-31 PROCEDURE — 94799 UNLISTED PULMONARY SVC/PX: CPT

## 2021-10-31 PROCEDURE — 84550 ASSAY OF BLOOD/URIC ACID: CPT | Performed by: INTERNAL MEDICINE

## 2021-10-31 PROCEDURE — 63710000001 INSULIN LISPRO (HUMAN) PER 5 UNITS: Performed by: INTERNAL MEDICINE

## 2021-10-31 RX ORDER — COLCHICINE 0.6 MG/1
0.6 TABLET ORAL EVERY 12 HOURS SCHEDULED
Status: DISCONTINUED | OUTPATIENT
Start: 2021-10-31 | End: 2021-11-09 | Stop reason: HOSPADM

## 2021-10-31 RX ADMIN — SODIUM CHLORIDE, PRESERVATIVE FREE 10 ML: 5 INJECTION INTRAVENOUS at 21:05

## 2021-10-31 RX ADMIN — HEPARIN SODIUM 5000 UNITS: 5000 INJECTION, SOLUTION INTRAVENOUS; SUBCUTANEOUS at 21:05

## 2021-10-31 RX ADMIN — GABAPENTIN 100 MG: 100 CAPSULE ORAL at 17:11

## 2021-10-31 RX ADMIN — METOPROLOL TARTRATE 100 MG: 50 TABLET, FILM COATED ORAL at 21:05

## 2021-10-31 RX ADMIN — ASPIRIN 81 MG: 81 TABLET, COATED ORAL at 09:13

## 2021-10-31 RX ADMIN — INSULIN DETEMIR 30 UNITS: 100 INJECTION, SOLUTION SUBCUTANEOUS at 21:14

## 2021-10-31 RX ADMIN — GABAPENTIN 100 MG: 100 CAPSULE ORAL at 09:13

## 2021-10-31 RX ADMIN — INSULIN LISPRO 4 UNITS: 100 INJECTION, SOLUTION INTRAVENOUS; SUBCUTANEOUS at 17:32

## 2021-10-31 RX ADMIN — IRON SUCROSE 200 MG: 20 INJECTION, SOLUTION INTRAVENOUS at 18:55

## 2021-10-31 RX ADMIN — METOPROLOL TARTRATE 100 MG: 50 TABLET, FILM COATED ORAL at 09:13

## 2021-10-31 RX ADMIN — COLCHICINE 0.6 MG: 0.6 TABLET, FILM COATED ORAL at 21:05

## 2021-10-31 RX ADMIN — MINERAL OIL, PETROLATUM: 425; 568 OINTMENT OPHTHALMIC at 14:23

## 2021-10-31 RX ADMIN — ACETAMINOPHEN 650 MG: 325 TABLET ORAL at 21:06

## 2021-10-31 RX ADMIN — CEFTRIAXONE SODIUM 1 G: 1 INJECTION, SOLUTION INTRAVENOUS at 17:12

## 2021-10-31 RX ADMIN — FAMOTIDINE 20 MG: 20 TABLET, FILM COATED ORAL at 21:05

## 2021-10-31 RX ADMIN — INSULIN LISPRO 2 UNITS: 100 INJECTION, SOLUTION INTRAVENOUS; SUBCUTANEOUS at 09:15

## 2021-10-31 RX ADMIN — HEPARIN SODIUM 5000 UNITS: 5000 INJECTION, SOLUTION INTRAVENOUS; SUBCUTANEOUS at 14:22

## 2021-10-31 RX ADMIN — POTASSIUM CHLORIDE 20 MEQ: 750 CAPSULE, EXTENDED RELEASE ORAL at 09:13

## 2021-10-31 RX ADMIN — MINERAL OIL, PETROLATUM: 425; 568 OINTMENT OPHTHALMIC at 21:06

## 2021-10-31 RX ADMIN — SODIUM CHLORIDE, PRESERVATIVE FREE 10 ML: 5 INJECTION INTRAVENOUS at 09:15

## 2021-10-31 RX ADMIN — COLCHICINE 0.6 MG: 0.6 TABLET, FILM COATED ORAL at 14:22

## 2021-10-31 RX ADMIN — HYDRALAZINE HYDROCHLORIDE 50 MG: 50 TABLET, FILM COATED ORAL at 21:05

## 2021-10-31 RX ADMIN — HEPARIN SODIUM 5000 UNITS: 5000 INJECTION, SOLUTION INTRAVENOUS; SUBCUTANEOUS at 05:54

## 2021-10-31 RX ADMIN — GABAPENTIN 100 MG: 100 CAPSULE ORAL at 21:05

## 2021-10-31 RX ADMIN — HYDRALAZINE HYDROCHLORIDE 50 MG: 50 TABLET, FILM COATED ORAL at 17:11

## 2021-10-31 RX ADMIN — HYDRALAZINE HYDROCHLORIDE 50 MG: 50 TABLET, FILM COATED ORAL at 09:14

## 2021-10-31 RX ADMIN — ISOSORBIDE MONONITRATE 30 MG: 30 TABLET ORAL at 06:19

## 2021-10-31 RX ADMIN — FUROSEMIDE 40 MG: 10 INJECTION, SOLUTION INTRAMUSCULAR; INTRAVENOUS at 04:05

## 2021-10-31 RX ADMIN — ESCITALOPRAM OXALATE 5 MG: 10 TABLET ORAL at 09:13

## 2021-10-31 RX ADMIN — INSULIN LISPRO 4 UNITS: 100 INJECTION, SOLUTION INTRAVENOUS; SUBCUTANEOUS at 12:21

## 2021-11-01 LAB
ANION GAP SERPL CALCULATED.3IONS-SCNC: 12.9 MMOL/L (ref 5–15)
BUN SERPL-MCNC: 61 MG/DL (ref 8–23)
BUN/CREAT SERPL: 27.6 (ref 7–25)
CALCIUM SPEC-SCNC: 9 MG/DL (ref 8.6–10.5)
CHLORIDE SERPL-SCNC: 95 MMOL/L (ref 98–107)
CO2 SERPL-SCNC: 26.1 MMOL/L (ref 22–29)
CREAT SERPL-MCNC: 2.21 MG/DL (ref 0.76–1.27)
GFR SERPL CREATININE-BSD FRML MDRD: 28 ML/MIN/1.73
GLUCOSE BLDC GLUCOMTR-MCNC: 163 MG/DL (ref 70–99)
GLUCOSE BLDC GLUCOMTR-MCNC: 225 MG/DL (ref 70–99)
GLUCOSE BLDC GLUCOMTR-MCNC: 232 MG/DL (ref 70–99)
GLUCOSE BLDC GLUCOMTR-MCNC: 244 MG/DL (ref 70–99)
GLUCOSE SERPL-MCNC: 162 MG/DL (ref 65–99)
NT-PROBNP SERPL-MCNC: ABNORMAL PG/ML (ref 0–1800)
POTASSIUM SERPL-SCNC: 4.4 MMOL/L (ref 3.5–5.2)
SODIUM SERPL-SCNC: 134 MMOL/L (ref 136–145)

## 2021-11-01 PROCEDURE — 25010000002 HEPARIN (PORCINE) PER 1000 UNITS: Performed by: INTERNAL MEDICINE

## 2021-11-01 PROCEDURE — 25010000002 IRON SUCROSE PER 1 MG: Performed by: INTERNAL MEDICINE

## 2021-11-01 PROCEDURE — 25010000002 CEFTRIAXONE PER 250 MG: Performed by: INTERNAL MEDICINE

## 2021-11-01 PROCEDURE — 25010000002 FUROSEMIDE PER 20 MG: Performed by: INTERNAL MEDICINE

## 2021-11-01 PROCEDURE — 82962 GLUCOSE BLOOD TEST: CPT

## 2021-11-01 PROCEDURE — 83880 ASSAY OF NATRIURETIC PEPTIDE: CPT | Performed by: INTERNAL MEDICINE

## 2021-11-01 PROCEDURE — 63710000001 INSULIN DETEMIR PER 5 UNITS: Performed by: INTERNAL MEDICINE

## 2021-11-01 PROCEDURE — 63710000001 INSULIN LISPRO (HUMAN) PER 5 UNITS: Performed by: INTERNAL MEDICINE

## 2021-11-01 PROCEDURE — 94799 UNLISTED PULMONARY SVC/PX: CPT

## 2021-11-01 PROCEDURE — 80048 BASIC METABOLIC PNL TOTAL CA: CPT | Performed by: INTERNAL MEDICINE

## 2021-11-01 PROCEDURE — 99233 SBSQ HOSP IP/OBS HIGH 50: CPT | Performed by: INTERNAL MEDICINE

## 2021-11-01 RX ORDER — LISINOPRIL 10 MG/1
10 TABLET ORAL EVERY 24 HOURS
Status: DISCONTINUED | OUTPATIENT
Start: 2021-11-01 | End: 2021-11-05

## 2021-11-01 RX ORDER — FUROSEMIDE 10 MG/ML
40 INJECTION INTRAMUSCULAR; INTRAVENOUS EVERY 12 HOURS
Status: DISCONTINUED | OUTPATIENT
Start: 2021-11-01 | End: 2021-11-03

## 2021-11-01 RX ADMIN — IRON SUCROSE 200 MG: 20 INJECTION, SOLUTION INTRAVENOUS at 17:52

## 2021-11-01 RX ADMIN — COLCHICINE 0.6 MG: 0.6 TABLET, FILM COATED ORAL at 08:25

## 2021-11-01 RX ADMIN — HYDRALAZINE HYDROCHLORIDE 50 MG: 50 TABLET, FILM COATED ORAL at 15:36

## 2021-11-01 RX ADMIN — ESCITALOPRAM OXALATE 5 MG: 10 TABLET ORAL at 08:25

## 2021-11-01 RX ADMIN — MINERAL OIL, PETROLATUM: 425; 568 OINTMENT OPHTHALMIC at 21:08

## 2021-11-01 RX ADMIN — GABAPENTIN 100 MG: 100 CAPSULE ORAL at 15:36

## 2021-11-01 RX ADMIN — COLCHICINE 0.6 MG: 0.6 TABLET, FILM COATED ORAL at 21:08

## 2021-11-01 RX ADMIN — ISOSORBIDE MONONITRATE 30 MG: 30 TABLET ORAL at 06:06

## 2021-11-01 RX ADMIN — INSULIN LISPRO 4 UNITS: 100 INJECTION, SOLUTION INTRAVENOUS; SUBCUTANEOUS at 11:19

## 2021-11-01 RX ADMIN — HEPARIN SODIUM 5000 UNITS: 5000 INJECTION, SOLUTION INTRAVENOUS; SUBCUTANEOUS at 21:09

## 2021-11-01 RX ADMIN — MINERAL OIL, PETROLATUM: 425; 568 OINTMENT OPHTHALMIC at 08:24

## 2021-11-01 RX ADMIN — INSULIN LISPRO 4 UNITS: 100 INJECTION, SOLUTION INTRAVENOUS; SUBCUTANEOUS at 16:53

## 2021-11-01 RX ADMIN — HEPARIN SODIUM 5000 UNITS: 5000 INJECTION, SOLUTION INTRAVENOUS; SUBCUTANEOUS at 06:06

## 2021-11-01 RX ADMIN — HYDRALAZINE HYDROCHLORIDE 50 MG: 50 TABLET, FILM COATED ORAL at 21:08

## 2021-11-01 RX ADMIN — METOPROLOL TARTRATE 100 MG: 50 TABLET, FILM COATED ORAL at 08:24

## 2021-11-01 RX ADMIN — METOPROLOL TARTRATE 100 MG: 50 TABLET, FILM COATED ORAL at 21:08

## 2021-11-01 RX ADMIN — FAMOTIDINE 20 MG: 20 TABLET, FILM COATED ORAL at 21:08

## 2021-11-01 RX ADMIN — ASPIRIN 81 MG: 81 TABLET, COATED ORAL at 08:25

## 2021-11-01 RX ADMIN — HYDRALAZINE HYDROCHLORIDE 50 MG: 50 TABLET, FILM COATED ORAL at 08:25

## 2021-11-01 RX ADMIN — HEPARIN SODIUM 5000 UNITS: 5000 INJECTION, SOLUTION INTRAVENOUS; SUBCUTANEOUS at 13:38

## 2021-11-01 RX ADMIN — GABAPENTIN 100 MG: 100 CAPSULE ORAL at 08:25

## 2021-11-01 RX ADMIN — INSULIN DETEMIR 30 UNITS: 100 INJECTION, SOLUTION SUBCUTANEOUS at 21:09

## 2021-11-01 RX ADMIN — SODIUM CHLORIDE, PRESERVATIVE FREE 10 ML: 5 INJECTION INTRAVENOUS at 08:25

## 2021-11-01 RX ADMIN — INSULIN LISPRO 4 UNITS: 100 INJECTION, SOLUTION INTRAVENOUS; SUBCUTANEOUS at 08:25

## 2021-11-01 RX ADMIN — SODIUM CHLORIDE, PRESERVATIVE FREE 10 ML: 5 INJECTION INTRAVENOUS at 21:14

## 2021-11-01 RX ADMIN — LISINOPRIL 10 MG: 10 TABLET ORAL at 23:12

## 2021-11-01 RX ADMIN — FUROSEMIDE 40 MG: 10 INJECTION INTRAMUSCULAR; INTRAVENOUS at 23:11

## 2021-11-01 RX ADMIN — CEFTRIAXONE SODIUM 1 G: 1 INJECTION, SOLUTION INTRAVENOUS at 15:36

## 2021-11-01 RX ADMIN — GABAPENTIN 100 MG: 100 CAPSULE ORAL at 21:08

## 2021-11-01 NOTE — PLAN OF CARE
Goal Outcome Evaluation:           Progress: no change  Outcome Summary: Patient slept most of shift. Patient with generalized weakness, Unable to perform ADLs independently. VS WNL

## 2021-11-01 NOTE — PLAN OF CARE
Goal Outcome Evaluation:      Alert and oriented x4.  No signs or symptoms of distress noted.  Vital signs stable.  Patient with thick greenish yellow drainage and redness to left eye.  Ointment applied.  No changes in patient condition noted.

## 2021-11-02 LAB
ANION GAP SERPL CALCULATED.3IONS-SCNC: 13.4 MMOL/L (ref 5–15)
BUN SERPL-MCNC: 65 MG/DL (ref 8–23)
BUN/CREAT SERPL: 31 (ref 7–25)
CALCIUM SPEC-SCNC: 9 MG/DL (ref 8.6–10.5)
CHLORIDE SERPL-SCNC: 96 MMOL/L (ref 98–107)
CO2 SERPL-SCNC: 26.6 MMOL/L (ref 22–29)
CREAT SERPL-MCNC: 2.1 MG/DL (ref 0.76–1.27)
GFR SERPL CREATININE-BSD FRML MDRD: 30 ML/MIN/1.73
GLUCOSE BLDC GLUCOMTR-MCNC: 149 MG/DL (ref 70–99)
GLUCOSE BLDC GLUCOMTR-MCNC: 165 MG/DL (ref 70–99)
GLUCOSE BLDC GLUCOMTR-MCNC: 246 MG/DL (ref 70–99)
GLUCOSE BLDC GLUCOMTR-MCNC: 275 MG/DL (ref 70–99)
GLUCOSE SERPL-MCNC: 156 MG/DL (ref 65–99)
POTASSIUM SERPL-SCNC: 4.3 MMOL/L (ref 3.5–5.2)
SODIUM SERPL-SCNC: 136 MMOL/L (ref 136–145)

## 2021-11-02 PROCEDURE — 80048 BASIC METABOLIC PNL TOTAL CA: CPT | Performed by: INTERNAL MEDICINE

## 2021-11-02 PROCEDURE — 25010000002 IRON SUCROSE PER 1 MG: Performed by: INTERNAL MEDICINE

## 2021-11-02 PROCEDURE — 63710000001 INSULIN DETEMIR PER 5 UNITS: Performed by: INTERNAL MEDICINE

## 2021-11-02 PROCEDURE — 97161 PT EVAL LOW COMPLEX 20 MIN: CPT

## 2021-11-02 PROCEDURE — 94799 UNLISTED PULMONARY SVC/PX: CPT

## 2021-11-02 PROCEDURE — 82962 GLUCOSE BLOOD TEST: CPT

## 2021-11-02 PROCEDURE — 99232 SBSQ HOSP IP/OBS MODERATE 35: CPT | Performed by: INTERNAL MEDICINE

## 2021-11-02 PROCEDURE — 25010000002 FUROSEMIDE PER 20 MG: Performed by: INTERNAL MEDICINE

## 2021-11-02 PROCEDURE — 63710000001 INSULIN LISPRO (HUMAN) PER 5 UNITS: Performed by: INTERNAL MEDICINE

## 2021-11-02 PROCEDURE — 25010000002 HEPARIN (PORCINE) PER 1000 UNITS: Performed by: INTERNAL MEDICINE

## 2021-11-02 PROCEDURE — 25010000002 CEFTRIAXONE PER 250 MG: Performed by: INTERNAL MEDICINE

## 2021-11-02 RX ADMIN — HEPARIN SODIUM 5000 UNITS: 5000 INJECTION, SOLUTION INTRAVENOUS; SUBCUTANEOUS at 06:17

## 2021-11-02 RX ADMIN — INSULIN LISPRO 6 UNITS: 100 INJECTION, SOLUTION INTRAVENOUS; SUBCUTANEOUS at 10:55

## 2021-11-02 RX ADMIN — HYDRALAZINE HYDROCHLORIDE 50 MG: 50 TABLET, FILM COATED ORAL at 21:27

## 2021-11-02 RX ADMIN — IRON SUCROSE 200 MG: 20 INJECTION, SOLUTION INTRAVENOUS at 17:47

## 2021-11-02 RX ADMIN — INSULIN LISPRO 7 UNITS: 100 INJECTION, SOLUTION INTRAVENOUS; SUBCUTANEOUS at 10:56

## 2021-11-02 RX ADMIN — METOPROLOL TARTRATE 100 MG: 50 TABLET, FILM COATED ORAL at 08:38

## 2021-11-02 RX ADMIN — GABAPENTIN 100 MG: 100 CAPSULE ORAL at 08:38

## 2021-11-02 RX ADMIN — INSULIN LISPRO 2 UNITS: 100 INJECTION, SOLUTION INTRAVENOUS; SUBCUTANEOUS at 17:46

## 2021-11-02 RX ADMIN — HYDRALAZINE HYDROCHLORIDE 50 MG: 50 TABLET, FILM COATED ORAL at 08:38

## 2021-11-02 RX ADMIN — COLCHICINE 0.6 MG: 0.6 TABLET, FILM COATED ORAL at 21:26

## 2021-11-02 RX ADMIN — HEPARIN SODIUM 5000 UNITS: 5000 INJECTION, SOLUTION INTRAVENOUS; SUBCUTANEOUS at 13:50

## 2021-11-02 RX ADMIN — LISINOPRIL 10 MG: 10 TABLET ORAL at 21:53

## 2021-11-02 RX ADMIN — SODIUM CHLORIDE, PRESERVATIVE FREE 10 ML: 5 INJECTION INTRAVENOUS at 21:27

## 2021-11-02 RX ADMIN — FUROSEMIDE 40 MG: 10 INJECTION INTRAMUSCULAR; INTRAVENOUS at 21:55

## 2021-11-02 RX ADMIN — GABAPENTIN 100 MG: 100 CAPSULE ORAL at 15:20

## 2021-11-02 RX ADMIN — FAMOTIDINE 20 MG: 20 TABLET, FILM COATED ORAL at 21:27

## 2021-11-02 RX ADMIN — INSULIN DETEMIR 30 UNITS: 100 INJECTION, SOLUTION SUBCUTANEOUS at 21:28

## 2021-11-02 RX ADMIN — COLCHICINE 0.6 MG: 0.6 TABLET, FILM COATED ORAL at 08:38

## 2021-11-02 RX ADMIN — SODIUM CHLORIDE, PRESERVATIVE FREE 10 ML: 5 INJECTION INTRAVENOUS at 08:41

## 2021-11-02 RX ADMIN — MINERAL OIL, PETROLATUM: 425; 568 OINTMENT OPHTHALMIC at 21:33

## 2021-11-02 RX ADMIN — CEFTRIAXONE SODIUM 1 G: 1 INJECTION, SOLUTION INTRAVENOUS at 15:21

## 2021-11-02 RX ADMIN — ASPIRIN 81 MG: 81 TABLET, COATED ORAL at 08:38

## 2021-11-02 RX ADMIN — INSULIN LISPRO 7 UNITS: 100 INJECTION, SOLUTION INTRAVENOUS; SUBCUTANEOUS at 17:47

## 2021-11-02 RX ADMIN — INSULIN LISPRO 7 UNITS: 100 INJECTION, SOLUTION INTRAVENOUS; SUBCUTANEOUS at 08:38

## 2021-11-02 RX ADMIN — METOPROLOL TARTRATE 100 MG: 50 TABLET, FILM COATED ORAL at 21:26

## 2021-11-02 RX ADMIN — FUROSEMIDE 40 MG: 10 INJECTION INTRAMUSCULAR; INTRAVENOUS at 10:34

## 2021-11-02 RX ADMIN — HEPARIN SODIUM 5000 UNITS: 5000 INJECTION, SOLUTION INTRAVENOUS; SUBCUTANEOUS at 21:28

## 2021-11-02 RX ADMIN — ISOSORBIDE MONONITRATE 30 MG: 30 TABLET ORAL at 06:17

## 2021-11-02 RX ADMIN — MINERAL OIL, PETROLATUM: 425; 568 OINTMENT OPHTHALMIC at 08:40

## 2021-11-02 RX ADMIN — ESCITALOPRAM OXALATE 5 MG: 10 TABLET ORAL at 08:38

## 2021-11-02 RX ADMIN — GABAPENTIN 100 MG: 100 CAPSULE ORAL at 21:26

## 2021-11-02 NOTE — PLAN OF CARE
Goal Outcome Evaluation:  Plan of Care Reviewed With: patient           Outcome Summary: No problems or concerns this shift. Pt slept well.

## 2021-11-02 NOTE — PLAN OF CARE
Goal Outcome Evaluation:  Plan of Care Reviewed With: patient        Progress: improving  Outcome Summary: Pt demonstrates deficits in transfers, bed mobility, ambulation and strength. Skilled PT services are recommended at this time in order to address these impairments and restore functional mobility.

## 2021-11-02 NOTE — CONSULTS
"Nutrition Services    Patient Name: Betty Givens  YOB: 1935  MRN: [id number]  Admission date: 10/28/2021      CLINICAL NUTRITION ASSESSMENT      Reason for Assessment  LOS     H&P:    Past Medical History:   Diagnosis Date   • Anasarca    • Anemia, unspecified    • Atherosclerotic heart disease of native coronary artery without angina pectoris    • Chronic diastolic (congestive) heart failure (HCC)    • Chronic kidney disease, stage 3 (HCC)     moderate   • DM (diabetes mellitus) (HCC)    • Essential (primary) hypertension    • Family history of colonic polyps    • Hypercholesterolemia    • Mixed hyperlipidemia    • Non-pressure chronic ulcer of other part of left foot with unspecified severity (HCC)    • Pure hypercholesterolemia    • Tachycardia    • Type 2 diabetes mellitus with diabetic chronic kidney disease (HCC)     without complications; with foot ulcer   • Unspecified osteoarthritis, unspecified site         Current Problems:   Active Hospital Problems    Diagnosis    • Acute congestive heart failure (HCC)         Nutrition/Diet History         Narrative     Pt is LOS day 5. Pt has been wt stable x 2 months per wt history. Consistently consumes between % of meals. No chewing or swallowing difficulties noted. No N/V/D/C. No acute nutrition intervention at this time. Will continue to follow and monitor per protocol.     Anthropometrics        Current Height, Weight Height: 186 cm (73.23\")  Weight: 118 kg (260 lb 2.3 oz)   Current BMI Body mass index is 34.11 kg/m².       Weight Hx  Wt Readings from Last 30 Encounters:   10/29/21 0326 118 kg (260 lb 2.3 oz)   08/18/21 1115 118 kg (260 lb)            Wt Change Observation      Estimated/Assessed Needs       Energy Requirements    EST Needs (kcal/day) 8771-2240        Protein Requirements    EST Daily Needs (g/day) 95       Fluid Requirements     Estimated Needs (mL/day) 1770     Labs/Medications         Pertinent Labs Reviewed. "   Results from last 7 days   Lab Units 11/02/21  0550 11/01/21  0415 10/31/21  0528 10/29/21  0609 10/28/21  1742   SODIUM mmol/L 136 134* 135*   < > 131*   POTASSIUM mmol/L 4.3 4.4 4.5   < > 4.3   CHLORIDE mmol/L 96* 95* 96*   < > 93*   CO2 mmol/L 26.6 26.1 25.8   < > 23.7   BUN mg/dL 65* 61* 60*   < > 54*   CREATININE mg/dL 2.10* 2.21* 2.17*   < > 2.26*   CALCIUM mg/dL 9.0 9.0 9.1   < > 9.1   BILIRUBIN mg/dL  --   --   --   --  0.5   ALK PHOS U/L  --   --   --   --  79   ALT (SGPT) U/L  --   --   --   --  6   AST (SGOT) U/L  --   --   --   --  13   GLUCOSE mg/dL 156* 162* 165*   < > 292*    < > = values in this interval not displayed.     Results from last 7 days   Lab Units 10/29/21  0609   MAGNESIUM mg/dL 1.8   HEMOGLOBIN g/dL 11.1*   HEMATOCRIT % 35.1*   TRIGLYCERIDES mg/dL 127     COVID19   Date Value Ref Range Status   10/29/2021 Not Detected Not Detected - Ref. Range Final     Lab Results   Component Value Date    HGBA1C 8.10 (H) 10/29/2021         Pertinent Medications Reviewed.     Current Nutrition Orders & Evaluation of Intake       Oral Nutrition     Current PO Diet Diet Regular; Cardiac   Supplement No active supplement orders       Nutrition Diagnosis         Nutrition Dx Problem 1 Excessive energy intake related to excessive oral intake as evidenced by obese BMI       Nutrition Intervention         Continue cardiac diet     Medical Nutrition Therapy/Nutrition Education          Learner     Readiness Patient  Eager     Method     Response Explanation  Verbalizes understanding     Monitor/Evaluation        Monitor Per protocol, I&O, PO intake, Pertinent labs, Weight       Nutrition Discharge Plan         To be determined       Electronically signed by:  Mena Montoya RD  11/02/21 09:25 EDT

## 2021-11-02 NOTE — THERAPY EVALUATION
Acute Care - Physical Therapy Initial Evaluation   Leana     Patient Name: Betty Givens  : 1935  MRN: [id number]  Today's Date: 2021   Admit date: 10/28/2021     Referring Physician: Mauro Cuellar MD     Surgery Date:* No surgery found *             Visit Dx:     ICD-10-CM ICD-9-CM   1. Acute congestive heart failure, unspecified heart failure type (HCC)  I50.9 428.0   2. B12 deficiency  E53.8 266.2   3. Decreased activities of daily living (ADL)  Z78.9 V49.89   4. Difficulty in walking  R26.2 719.7     Patient Active Problem List   Diagnosis   • Essential hypertension   • Mixed hyperlipidemia   • Non-pressure chronic ulcer of other part of left foot with unspecified severity ([facility])   • Ulcer of foot due to type 2 diabetes mellitus ([facility])   • Type 2 diabetes mellitus without complication, with long-term current use of insulin ([facility])   • Stage 3b chronic kidney disease ([facility])   • Atherosclerosis of native coronary artery of native heart without angina pectoris   • Chronic combined systolic (congestive) and diastolic (congestive) heart failure ([facility])   • Primary osteoarthritis involving multiple joints   • Iron deficiency anemia secondary to inadequate dietary iron intake   • Other fatigue   • Acute idiopathic gout of right hand   • Acute congestive heart failure (HCC)     Past Medical History:   Diagnosis Date   • Anasarca    • Anemia, unspecified    • Atherosclerotic heart disease of native coronary artery without angina pectoris    • Chronic diastolic (congestive) heart failure ([facility])    • Chronic kidney disease, stage 3 ([facility])     moderate   • DM (diabetes mellitus) ([facility])    • Essential (primary) hypertension    • Family history of colonic polyps    • Hypercholesterolemia    • Mixed hyperlipidemia    • Non-pressure chronic ulcer of other part of left foot with unspecified severity ([facility])    • Pure hypercholesterolemia    • Tachycardia    • Type 2 diabetes mellitus with diabetic chronic kidney  disease (HCC)     without complications; with foot ulcer   • Unspecified osteoarthritis, unspecified site      Past Surgical History:   Procedure Laterality Date   • CATARACT EXTRACTION Left    • CHOLECYSTECTOMY     • EYE SURGERY Right    • SKIN CANCER DESTRUCTION  06/2019;6/2018    removal from hand; forehead      PT Assessment (last 12 hours)     PT Evaluation and Treatment     Row Name 11/02/21 1102          Physical Therapy Time and Intention    Subjective Information no complaints  -AC     Document Type evaluation  -AC     Mode of Treatment individual therapy; physical therapy  -AC     Patient Effort adequate  -AC     Symptoms Noted During/After Treatment none  -AC     Row Name 11/02/21 1102          General Information    Patient Observations alert; cooperative; agree to therapy  -AC     Prior Level of Function independent:; all household mobility  -AC     Equipment Currently Used at Home none  -AC     Existing Precautions/Restrictions fall  -AC     Row Name 11/02/21 1102          Living Environment    Current Living Arrangements home/apartment/condo  -AC     Lives With child(josé miguel), adult; other relative(s)  -AC     Row Name 11/02/21 1102          Home Use of Assistive/Adaptive Equipment    Equipment Currently Used at Home walker, rolling; wheelchair  -AC     Row Name 11/02/21 1102          Range of Motion (ROM)    Range of Motion ROM is WFL  -AC     Row Name 11/02/21 1102          Strength (Manual Muscle Testing)    Strength (Manual Muscle Testing) bilateral lower extremities  3+/5  -AC     Row Name 11/02/21 1102          Bed Mobility    Bed Mobility supine-sit  -AC     Supine-Sit [place] (Bed Mobility) minimum assist (75% patient effort); moderate assist (50% patient effort)  -AC     Bed Mobility, Safety Issues decreased use of arms for pushing/pulling; decreased use of legs for bridging/pushing; impaired trunk control for bed mobility  -AC     Assistive Device (Bed Mobility) bed rails; draw sheet  -AC      Row Name 11/02/21 1102          Safety Issues, Functional Mobility    Safety Issues Affecting Function (Mobility) impulsivity; safety precaution awareness; safety precautions follow-through/compliance  -AC     Impairments Affecting Function (Mobility) balance; endurance/activity tolerance; pain; strength  -AC     Row Name 11/02/21 1102          Balance    Balance Assessment sitting dynamic balance  -AC     Dynamic Sitting Balance mild impairment  -AC     Row Name             Wound 10/29/21 1413 Bilateral medial gluteal Pressure Injury    Wound - Properties Group Placement Date: 10/29/21  -KT Placement Time: 1413  -KT Present on Hospital Admission: --  -KT, Per sister, it was present before admission  Side: Bilateral  -KT Orientation: medial  -KT Location: gluteal  -KT Primary Wound Type: Pressure inj  -KT     Retired Wound - Properties Group Date first assessed: 10/29/21  -KT Time first assessed: 1413  -KT Present on Hospital Admission: --  -KT, Per sister, it was present before admission  Side: Bilateral  -KT Location: gluteal  -KT Primary Wound Type: Pressure inj  -KT     Row Name             Wound 10/29/21 1417 Left upper back Other (comment)    Wound - Properties Group Placement Date: 10/29/21  -KT Placement Time: 1417  -KT Present on Hospital Admission: Y  -KT Side: Left  -KT Orientation: upper  -KT Location: back  -KT Primary Wound Type: Other  -KT, surgery incision      Retired Wound - Properties Group Date first assessed: 10/29/21  -KT Time first assessed: 1417  -KT Present on Hospital Admission: Y  -KT Side: Left  -KT Location: back  -KT Primary Wound Type: Other  -KT, surgery incision      Row Name 11/02/21 1102          Plan of Care Review    Plan of Care Reviewed With patient  -AC     Progress improving  -AC     Outcome Summary Pt demonstrates deficits in transfers, bed mobility, ambulation and strength. Skilled PT services are recommended at this time in order to address these impairments and restore  functional mobility.  -AC     Row Name 11/02/21 1102          Physical Therapy Goals    Bed Mobility Goal Selection (PT) bed mobility, PT goal 1  -AC     Transfer Goal Selection (PT) transfer, PT goal 1  -AC     Gait Training Goal Selection (PT) gait training, PT goal 1  -AC     Row Name 11/02/21 1102          Bed Mobility Goal 1 (PT)    Activity/Assistive Device (Bed Mobility Goal 1, PT) bed mobility activities, all  -AC     [place] Level/Cues Needed (Bed Mobility Goal 1, PT) standby assist  -AC     Time Frame (Bed Mobility Goal 1, PT) 10 days  -AC     Row Name 11/02/21 1102          Transfer Goal 1 (PT)    Activity/Assistive Device (Transfer Goal 1, PT) transfers, all; walker, rolling  -AC     [place] Level/Cues Needed (Transfer Goal 1, PT) contact guard assist  -AC     Time Frame (Transfer Goal 1, PT) 10 days  -AC     Row Name 11/02/21 1102          Gait Training Goal 1 (PT)    Activity/Assistive Device (Gait Training Goal 1, PT) gait (walking locomotion); assistive device use; walker, rolling  -AC     [place] Level (Gait Training Goal 1, PT) contact guard assist  -AC     Distance (Gait Training Goal 1, PT) 50 feet  -AC     Time Frame (Gait Training Goal 1, PT) 10 days  -AC     Row Name 11/02/21 1102          Therapy Assessment/Plan (PT)    Rehab Potential (PT) good, to achieve stated therapy goals  -AC     Criteria for Skilled Interventions Met (PT) skilled treatment is necessary  -AC     Predicted Duration of Therapy Intervention (PT) 10 days  -AC     Problem List (PT) problems related to; balance; cognition; coordination; mobility; strength; pain  -AC     Activity Limitations Related to Problem List (PT) unable to ambulate safely; unable to transfer safely  -AC     Row Name 11/02/21 1102          PT Evaluation Complexity    History, PT Evaluation Complexity 1-2 personal factors and/or comorbidities  -AC     Examination of Body Systems (PT Eval Complexity) total of 4 or more elements  -AC      Clinical Presentation (PT Evaluation Complexity) stable  -AC     Clinical Decision Making (PT Evaluation Complexity) low complexity  -AC     Overall Complexity (PT Evaluation Complexity) low complexity  -AC     Row Name 11/02/21 1102          Therapy Plan Review/Discharge Plan (PT)    Therapy Plan Review (PT) evaluation/treatment results reviewed; patient  -AC           User Key  (r) = Recorded By, (t) = Taken By, (c) = Cosigned By    Initials Name Provider Type    AC Nigel Mccloud, PT Physical Therapist    Hamida David RNA Registered Nurse              Physical Therapy Education                 Title: PT OT SLP Therapies (Done)     Topic: Physical Therapy (Done)     Point: Mobility training (Done)     Learning Progress Summary           Patient Acceptance, E,TB, VU by AC at 11/2/2021 1516                   Point: Home exercise program (Done)     Learning Progress Summary           Patient Acceptance, E,TB, VU by AC at 11/2/2021 1516                   Point: Body mechanics (Done)     Learning Progress Summary           Patient Acceptance, E,TB, VU by AC at 11/2/2021 1516                   Point: Precautions (Done)     Learning Progress Summary           Patient Acceptance, E,TB, VU by AC at 11/2/2021 1516                               User Key     Initials Effective Dates Name Provider Type Discipline    AC 06/03/21 -  Nigel Mccloud, PT Physical Therapist PT              PT Recommendation and Plan  Anticipated Discharge Disposition (PT): inpatient rehabilitation facility, sub acute care setting  Planned Therapy Interventions (PT): balance training, bed mobility training, gait training, home exercise program, strengthening, transfer training  Therapy Frequency (PT): daily  Plan of Care Reviewed With: patient  Progress: improving  Outcome Summary: Pt demonstrates deficits in transfers, bed mobility, ambulation and strength. Skilled PT services are recommended at this time in order to address these  impairments and restore functional mobility.   Outcome Measures     Row Name 11/02/21 1516             How much help from another person do you currently need...    Turning from your back to your side while in flat bed without using bedrails? 3  -AC      Moving from lying on back to sitting on the side of a flat bed without bedrails? 3  -AC      Moving to and from a bed to a chair (including a wheelchair)? 2  -AC      Standing up from a chair using your arms (e.g., wheelchair, bedside chair)? 2  -AC      Climbing 3-5 steps with a railing? 1  -AC      To walk in hospital room? 2  -AC      AM-PAC 6 Clicks Score (PT) 13  -AC              Functional Assessment    Outcome Measure Options AM-PAC 6 Clicks Basic Mobility (PT)  -AC            User Key  (r) = Recorded By, (t) = Taken By, (c) = Cosigned By    Initials Name Provider Type    Nigel Schmid, CLAUDETTE Physical Therapist                 Time Calculation:    PT Charges     Row Name 11/02/21 1102             Time Calculation    PT Received On 11/02/21  -AC      PT Goal Re-Cert Due Date 11/11/21  -AC              Untimed Charges    PT Eval/Re-eval Minutes 25  -AC              Total Minutes    Untimed Charges Total Minutes 25  -AC       Total Minutes 25  -AC            User Key  (r) = Recorded By, (t) = Taken By, (c) = Cosigned By    Initials Name Provider Type    Nigel Schmid, PT Physical Therapist              Therapy Charges for Today     Code Description Service Date Service Provider Modifiers Qty    95778723759 HC PT EVAL LOW COMPLEXITY 2 11/2/2021 Nigel Mccloud PT GP 1          PT G-Codes  Outcome Measure Options: AM-PAC 6 Clicks Basic Mobility (PT)  AM-PAC 6 Clicks Score (PT): 13  AM-PAC 6 Clicks Score (OT): Cris Mccloud PT  11/2/2021

## 2021-11-02 NOTE — PROGRESS NOTES
Patient Name: Betty Givens Date of Admission: 10/28/2021   : 1935 MRN: [id number]   Location: [facility] 3W 366/1 CSN: [id number]       [facility]   Follow Up  DATE: 2021  Primary Care Provider  Mauro Cuellar MD    Subjective   Subjective     Subjective  Patient seen on rounds this morning, supine in bed without any distress.  Sounds like he worked with PT some, perhaps was up on the side of the bed.  His edema is stable today.  He does not have any new complaints.  Case reviewed with RN at the bedside...Patient no distress, ate his whole lunch it looks like.  No new issues reported per staff.  Labs reviewed, med changes needed.  His exam is unchanged.---> Patient seen on rounds this morning, his family was at the bedside again and issues were discussed in detail.  Patient no distress, apparently did fine with breakfast.  He was not been seen by PT yesterday either.  Await formal PT eval and treatment, placement is needed.  His exam is unchanged .    Objective   Objective     ROS    Review of Systems   Reason unable to perform ROS: Patient basically deaf.       Exam    Vitals Signs    Temp:  [97.7 °F (36.5 °C)-98.3 °F (36.8 °C)] 97.7 °F (36.5 °C)  Heart Rate:  [59-78] 64  Resp:  [16-18] 18  BP: ()/(50-65) 99/50    Intake & Output (last 3 days)       10/31 0701   0700  0701   0700  0701   0700    P.O. 480      Total Intake(mL/kg) 480 (4.1)      Urine (mL/kg/hr) 400 (0.1) 2000 (0.7) 575 (0.4)    Total Output 400  575    Net +80 -2000 -575                  Physical Exam  Constitutional:       General: He is not in acute distress.     Appearance: He is obese. He is ill-appearing. He is not toxic-appearing.      Comments: Chronically ill-appearing.   HENT:      Head: Atraumatic.      Comments: Profoundly hard of hearing.     Right Ear: External ear normal.      Left Ear: External ear normal.      Nose: Nose normal.      Mouth/Throat:      Mouth: Mucous  membranes are moist.   Eyes:      General:         Right eye: No discharge.         Left eye: No discharge.      Extraocular Movements: Extraocular movements intact.      Pupils: Pupils are equal, round, and reactive to light.   Cardiovascular:      Rate and Rhythm: Normal rate and regular rhythm.      Pulses: Normal pulses.      Heart sounds: Normal heart sounds. No murmur heard.  No gallop.       Comments: No S3.  Pulmonary:      Effort: Pulmonary effort is normal. No respiratory distress.      Breath sounds: No wheezing, rhonchi or rales.      Comments: Diminished breath sounds, but not labored.  Abdominal:      General: There is no distension.      Palpations: Abdomen is soft. There is no mass.      Tenderness: There is no abdominal tenderness. There is no guarding.   Musculoskeletal:         General: No swelling or tenderness.      Cervical back: No tenderness.      Right lower leg: Edema present.      Left lower leg: Edema present.      Comments: Persistent anasarca as of 11/2.   Skin:     General: Skin is warm and dry.      Findings: No rash.   Neurological:      General: No focal deficit present.      Mental Status: He is alert and oriented to person, place, and time. Mental status is at baseline.      Motor: Weakness present.      Gait: Gait abnormal.      Comments: Difficult eval given his near deafness.   Psychiatric:         Mood and Affect: Mood normal.      Comments: No agitation, just hard of hearing and therefore difficult to communicate with.         Labs:        Lab 10/29/21  0609 10/28/21  1742   WBC 7.20 7.84   HEMOGLOBIN 11.1* 10.5*   HEMATOCRIT 35.1* 33.0*   PLATELETS 275 262           Lab 11/02/21  0550 11/01/21  0415   SODIUM 136 134*   POTASSIUM 4.3 4.4   CHLORIDE 96* 95*   CO2 26.6 26.1   BUN 65* 61*       Assessment / Plan     Current Medications    Scheduled Meds:artificial tears, , Left Eye, BID  aspirin, 81 mg, Oral, Daily  cefTRIAXone, 1 g, Intravenous, Q24H  colchicine, 0.6 mg, Oral,  Q12H  cyanocobalamin, 1,000 mcg, Intramuscular, Q14 Days  escitalopram, 5 mg, Oral, Daily  famotidine, 20 mg, Oral, Nightly  furosemide, 40 mg, Intravenous, Q12H  gabapentin, 100 mg, Oral, TID  heparin (porcine), 5,000 Units, Subcutaneous, Q8H  hydrALAZINE, 50 mg, Oral, TID  insulin detemir, 30 Units, Subcutaneous, Nightly  insulin lispro, 0-9 Units, Subcutaneous, TID AC  insulin lispro, 7 Units, Subcutaneous, TID With Meals  isosorbide mononitrate, 30 mg, Oral, QAM  lisinopril, 10 mg, Oral, Q24H  metoprolol tartrate, 100 mg, Oral, BID  sodium chloride, 10 mL, Intravenous, Q12H      Continuous Infusions:   PRN Meds:.•  acetaminophen **OR** acetaminophen **OR** acetaminophen  •  dextrose  •  dextrose  •  glucagon (human recombinant)  •  nitroglycerin  •  ondansetron  •  sodium chloride  •  sodium chloride     Impression    1.  Acute on chronic combined heart failure.  As noted patient with gradual onset of his symptoms, and his BNP is markedly elevated.  Of note his GFR is not far off of his baseline if at all.  So certainly agree with aggressive IV diuretics as written for.  Additionally we will continue his ACE inhibitor and beta-blocker as his blood pressure allows.  We will get Dr. Carbajal involved if not improving as expected...his renal function is bumped as of 10/31, so we will hold further doses of Lasix today and repeat labs in AM...Echo with EF of only 30 to 35% with no severe valvular abnormalities.  His lisinopril was not renewed on admission, so we will resume that now along with a lower dose of IV Lasix as of 11/1.---> I consulted with Dr. Carbajal this a.m. as of 11/2 in regards to further medication titration, in regards to possibly changing his beta-blocker and/or trying Entresto.     2.  Coronary artery disease with possible type II MI.  Patient without any report of any chest pain per se, but he does have mildly elevated enzymes.  We will keep him on the telemetry monitor as a precaution.   Continue with his beta-blocker and his Imdur as an antianginal regimen as well---> still no chest pain.     3.  Diabetes mellitus uncontrolled.  Patient is on 40 units of Lantus as well as moderate dose Metformin, moderate dose Actos, and near max dose of glimepiride at home.  Obviously will go with just insulin in the hospital, he will probably need aggressive dosing---> need to titrate insulin as of 11/1 and will follow up blood sugars in a few days.    4.  Chronic kidney disease stage 3b-4.  We will monitor this closely on diuretics obviously...As noted, a little worse today after an improvement yesterday, so we will hold Lasix and follow-up in the a.m. 11/1.---> Okay to resume Lasix this evening at a decreased dose as of 11/1 and will follow up labs in the a.m 11/3.     5.  Hypertension.  Continue meds as blood pressure allows.     6.  Iron deficiency anemia as well as anemia related to chronic kidney disease.  Hemoglobin of 11 is made about a gram below his baseline.  Hopefully this is delusional.  Will monitor, and will also give IV Venofer #5 as of 11/1 since he is in house.     7.  Gait dysfunction.  Apparently the patient is nearly bedridden at home, barely able to help family with transfers to a wheelchair.  They and the patient report this is due to the significant bilateral foot pain, likely diabetic neuropathy.  We will start gabapentin, and check uric acid given his history of gout and their concerns for this.     8.    Acute gouty arthritis.  His foot pain is diffuse, and there is no localized erythema, so is obviously no related to out.  However his uric acid did come back extremely elevated, so I will try him on a few days of colchicine to see if that helps his symptoms some.    9. Depression.  Family reports that the patient threatened to put a bag over his head and suffocate himself when he was in rehab at [facility].  Apparently he felt severely neglected.  Given his present  status, he may very well end up back in the nursing home, we will start SSRI at this time.     10.  DVT prophylaxis.  Via subQ heparin given his chronic kidney disease.     11. PUD prophylaxis.  Via H2 blocker.    Problem List    Active Problems:    Acute congestive heart failure (HCC)      Plan:  as above      Electronically signed by Mauro Cuellar MD, 11/2/2021, 19:07 EDT.

## 2021-11-02 NOTE — CONSULTS
[facility]   Consult Note    Patient Name: Betty Givens  : 1935  MRN: [id number]  Primary Care Physician:  Mauro Cuellar MD  Referring Physician: No ref. provider found  Date of admission: 10/28/2021    History of Present Illness:  Patient is a 86-year-old male with multiple medical problems including diabetes coronary disease chronic combined heart failure came to the emergency room for evaluation for shortness of breath in the ER he was found to have CHF and BNP was up to 30,000 chest x-ray did show pulmonary congestion.  Covid test was negative.  His hospitalization he has been on IV Lasix and with a good diuresis    Past Medical History:  Past Medical History:   Diagnosis Date   • Anasarca    • Anemia, unspecified    • Atherosclerotic heart disease of native coronary artery without angina pectoris    • Chronic diastolic (congestive) heart failure (HCC)    • Chronic kidney disease, stage 3 (HCC)     moderate   • DM (diabetes mellitus) (HCC)    • Essential (primary) hypertension    • Family history of colonic polyps    • Hypercholesterolemia    • Mixed hyperlipidemia    • Non-pressure chronic ulcer of other part of left foot with unspecified severity (HCC)    • Pure hypercholesterolemia    • Tachycardia    • Type 2 diabetes mellitus with diabetic chronic kidney disease (HCC)     without complications; with foot ulcer   • Unspecified osteoarthritis, unspecified site        Past Surgical History:  Past Surgical History:   Procedure Laterality Date   • CATARACT EXTRACTION Left    • CHOLECYSTECTOMY     • EYE SURGERY Right    • SKIN CANCER DESTRUCTION  2019;2018    removal from hand; forehead        Family History:   History reviewed. No pertinent family history.    Social History:   Social History     Socioeconomic History   • Marital status: Single   Tobacco Use   • Smoking status: Never Smoker   • Smokeless tobacco: Never Used   Substance and Sexual Activity   • Alcohol use: Never   • Drug  use: Never       Home Medications:  Easy Touch Lancets 32G/Twist, Insulin Glargine, NIFEdipine XL, aspirin, ferrous sulfate, furosemide, glimepiride, glucose blood, hydrALAZINE, isosorbide mononitrate, lisinopril, metFORMIN ER, metoprolol tartrate, pioglitazone, pravastatin, and vitamin D      Allergies:  Allergies   Allergen Reactions   • Crestor [Rosuvastatin] Myalgia       Objective   Objective     Vitals:   Temp:  [97.8 °F (36.6 °C)-98.3 °F (36.8 °C)] 98.3 °F (36.8 °C)  Heart Rate:  [59-78] 59  Resp:  [16-76] 16  BP: (110-130)/(53-65) 113/57    Physical Exam   Physical Exam  Vitals and nursing note reviewed.   Constitutional:       General: He is not in acute distress.     Appearance: Normal appearance.   HENT:      Head: Normocephalic.      Nose: Nose normal.      Mouth/Throat:      Mouth: Mucous membranes are moist.   Eyes:      Pupils: Pupils are equal, round, and reactive to light.   Cardiovascular:      Pulses: Normal pulses.           Carotid pulses are 2+ on the right side and 2+ on the left side.       Radial pulses are 2+ on the right side and 2+ on the left side.        Femoral pulses are 2+ on the right side and 2+ on the left side.       Popliteal pulses are 2+ on the right side and 2+ on the left side.        Dorsalis pedis pulses are 2+ on the right side and 2+ on the left side.        Posterior tibial pulses are 2+ on the right side and 2+ on the left side.      Heart sounds: No murmur heard.  No friction rub. No gallop.    Pulmonary:      Effort: Pulmonary effort is normal. No respiratory distress.      Breath sounds: Normal breath sounds and air entry. No stridor. No wheezing, rhonchi or rales.   Chest:      Chest wall: No tenderness.   Musculoskeletal:      Right lower leg: No edema.      Left lower leg: No edema.   Neurological:      General: No focal deficit present.      Mental Status: He is alert and oriented to person, place, and time.   Psychiatric:         Mood and Affect: Mood normal.            Result Review    Result Review:  I have personally reviewed the results from the time of this admission to 11/02/21 11:12 AM EDT and agree with these findings:  [x]  Laboratory  [x]  Microbiology  [x]  Radiology  [x]  EKG/Telemetry   []  Cardiology/Vascular   []  Pathology  []  Old records  []  Other:    EKG:  Sinus rhythm left bundle branch block and ST-T changes    Assessment/Plan   Assessment / Plan     Active Hospital Problems:  Active Hospital Problems    Diagnosis    • Acute congestive heart failure (HCC)    Acute on chronic combined heart failure  Diabetes mellitus uncontrolled  Chronic kidney disease stage IIIb  Hypertension  Depression    Recommendations:  Patient seems to be diuresing well with present diuretic regimen.  Continue Lasix IV twice daily  CMP in the morning  PT and OT for strengthening exercises    Thanks for letting participate in the care of this pleasant gentleman      Time spent including face to face service excluding procedures:  50 minutes          Electronically signed by Beck Carbajal MD, 11/02/21, 11:12 AM EDT.

## 2021-11-02 NOTE — PROGRESS NOTES
Patient Name: Betty Givens Date of Admission: 10/28/2021   : 1935 MRN: [id number]   Location: [facility] 366/1 CSN: [id number]       [facility]   Follow Up  DATE: 2021  Primary Care Provider  Mauro Cuellar MD    Subjective   Subjective     Subjective  Patient seen on rounds this morning, supine in bed without any distress.  Sounds like he worked with PT some, perhaps was up on the side of the bed.  His edema is stable today.  He does not have any new complaints.  Case reviewed with RN at the bedside...Patient no distress, ate his whole lunch it looks like.  No new issues reported per staff.  Labs reviewed, med changes needed.  His exam is unchanged.---> Patient seen on rounds this morning, his family was at the bedside and issues were discussed in detail.  Patient no distress, apparently did fine with breakfast.  He had not been seen by PTO weekend, and reviewing the chart just now does not look like they were able to see him today either.  Await formal PT eval and treatment, placement is needed.  His exam is unchanged .    Objective   Objective     ROS    Review of Systems   Reason unable to perform ROS: Patient basically deaf.       Exam    Vitals Signs    Temp:  [97.8 °F (36.6 °C)-98.2 °F (36.8 °C)] 97.8 °F (36.6 °C)  Heart Rate:  [65-73] 73  Resp:  [16-76] 76  BP: (118-132)/(56-76) 124/61    Intake & Output (last 3 days)       10/30 0701  10/31 0700 10/31 0701   0700  0701   0700    P.O. 1030 480     Total Intake(mL/kg) 1030 (8.7) 480 (4.1)     Urine (mL/kg/hr) 850 (0.3) 400 (0.1)     Total Output 850 400     Net +180 +80                   Physical Exam  Constitutional:       General: He is not in acute distress.     Appearance: He is obese. He is ill-appearing. He is not toxic-appearing.      Comments: Chronically ill-appearing.   HENT:      Head: Atraumatic.      Comments: Profoundly hard of hearing.     Right Ear: External ear normal.      Left Ear: External ear  normal.      Nose: Nose normal.      Mouth/Throat:      Mouth: Mucous membranes are moist.   Eyes:      General:         Right eye: No discharge.         Left eye: No discharge.      Extraocular Movements: Extraocular movements intact.      Pupils: Pupils are equal, round, and reactive to light.   Cardiovascular:      Rate and Rhythm: Normal rate and regular rhythm.      Pulses: Normal pulses.      Heart sounds: Normal heart sounds. No murmur heard.  No gallop.       Comments: No S3.  Pulmonary:      Effort: Pulmonary effort is normal. No respiratory distress.      Breath sounds: No wheezing, rhonchi or rales.      Comments: Diminished breath sounds, but not labored.  Abdominal:      General: There is no distension.      Palpations: Abdomen is soft. There is no mass.      Tenderness: There is no abdominal tenderness. There is no guarding.   Musculoskeletal:         General: No swelling or tenderness.      Cervical back: No tenderness.      Right lower leg: Edema present.      Left lower leg: Edema present.      Comments: Persistent anasarca.   Skin:     General: Skin is warm and dry.      Findings: No rash.   Neurological:      General: No focal deficit present.      Mental Status: He is alert and oriented to person, place, and time. Mental status is at baseline.      Motor: Weakness present.      Gait: Gait abnormal.      Comments: Difficult eval given his near deafness.   Psychiatric:         Mood and Affect: Mood normal.      Comments: No agitation, just hard of hearing and therefore difficult to communicate with.         Labs:        Lab 10/29/21  0609 10/28/21  1742   WBC 7.20 7.84   HEMOGLOBIN 11.1* 10.5*   HEMATOCRIT 35.1* 33.0*   PLATELETS 275 262           Lab 11/01/21  0415 10/31/21  0528   SODIUM 134* 135*   POTASSIUM 4.4 4.5   CHLORIDE 95* 96*   CO2 26.1 25.8   BUN 61* 60*       Assessment / Plan     Current Medications    Scheduled Meds:artificial tears, , Left Eye, BID  aspirin, 81 mg, Oral,  Daily  cefTRIAXone, 1 g, Intravenous, Q24H  colchicine, 0.6 mg, Oral, Q12H  cyanocobalamin, 1,000 mcg, Intramuscular, Q14 Days  escitalopram, 5 mg, Oral, Daily  famotidine, 20 mg, Oral, Nightly  furosemide, 40 mg, Intravenous, Q12H  gabapentin, 100 mg, Oral, TID  heparin (porcine), 5,000 Units, Subcutaneous, Q8H  hydrALAZINE, 50 mg, Oral, TID  insulin detemir, 30 Units, Subcutaneous, Nightly  insulin lispro, 0-9 Units, Subcutaneous, TID AC  iron sucrose, 200 mg, Intravenous, Q24H  isosorbide mononitrate, 30 mg, Oral, QAM  lisinopril, 10 mg, Oral, Q24H  metoprolol tartrate, 100 mg, Oral, BID  sodium chloride, 10 mL, Intravenous, Q12H      Continuous Infusions:   PRN Meds:.•  acetaminophen **OR** acetaminophen **OR** acetaminophen  •  dextrose  •  dextrose  •  glucagon (human recombinant)  •  nitroglycerin  •  ondansetron  •  sodium chloride  •  sodium chloride     Impression    1.  Acute on chronic combined heart failure.  As noted patient with gradual onset of his symptoms, and his BNP is markedly elevated.  Of note his GFR is not far off of his baseline if at all.  So certainly agree with aggressive IV diuretics as written for.  Additionally we will continue his ACE inhibitor and beta-blocker as his blood pressure allows.  We will get Dr. Carbajal involved if not improving as expected...his renal function is bumped as of 10/31, so we will hold further doses of Lasix today and repeat labs in AM---> Echo with EF of only 30 to 35% with no severe valvular abnormalities.  His lisinopril was not renewed on admission, so we will resume that now along with a lower dose of IV Lasix as of 11/1.  We will consult with Dr. Carbajal in the a.m. 11/2 in regards to further medication titration, in regards to possibly changing his beta-blocker and/or trying Entresto.     2.  Coronary artery disease with possible type II MI.  Patient without any report of any chest pain per se, but he does have mildly elevated enzymes.  We will  keep him on the telemetry monitor as a precaution.  Continue with his beta-blocker and his Imdur as an antianginal regimen as well---> still no chest pain.     3.  Diabetes mellitus uncontrolled.  Patient is on 40 units of Lantus as well as moderate dose Metformin, moderate dose Actos, and near max dose of glimepiride at home.  Obviously will go with just insulin in the hospital, he will probably need aggressive dosing---> need to titrate insulin as of 11/1.    4.  Chronic kidney disease stage 3b-4.  We will monitor this closely on diuretics obviously...As noted, a little worse today after an improvement yesterday, so we will hold Lasix and follow-up in the a.m. 11/1.---> Okay to resume Lasix this evening at a decreased dose as of 11/1.     5.  Hypertension.  Continue meds as blood pressure allows.     6.  Iron deficiency anemia as well as anemia related to chronic kidney disease.  Hemoglobin of 11 is made about a gram below his baseline.  Hopefully this is delusional.  Will monitor, and will also give IV Venofer #4 as of 10/31 since he is in house.     7.  Gait dysfunction.  Apparently the patient is nearly bedridden at home, barely able to help family with transfers to a wheelchair.  They and the patient report this is due to the significant bilateral foot pain, likely diabetic neuropathy.  We will start gabapentin, and check uric acid given his history of gout and their concerns for this.     8.    Acute gouty arthritis.  His foot pain is diffuse, and there is no localized erythema, so is obviously no related to out.  However his uric acid did come back extremely elevated, so I will try him on a few days of colchicine to see if that helps his symptoms some.    9. Depression.  Family reports that the patient threatened to put a bag over his head and suffocate himself when he was in rehab at [facility].  Apparently he felt severely neglected.  Given his present status, he may very well end up back in  the nursing home, we will start SSRI at this time.     10.  DVT prophylaxis.  Via subQ heparin given his chronic kidney disease.     11. PUD prophylaxis.  Via H2 blocker.    Problem List    Active Problems:    Acute congestive heart failure (HCC)      Plan:  as above      Electronically signed by Mauro Cuellar MD, 11/1/2021, 21:58 EDT.

## 2021-11-03 LAB
ANION GAP SERPL CALCULATED.3IONS-SCNC: 12.1 MMOL/L (ref 5–15)
BUN SERPL-MCNC: 71 MG/DL (ref 8–23)
BUN/CREAT SERPL: 26.4 (ref 7–25)
CALCIUM SPEC-SCNC: 8.8 MG/DL (ref 8.6–10.5)
CHLORIDE SERPL-SCNC: 97 MMOL/L (ref 98–107)
CO2 SERPL-SCNC: 26.9 MMOL/L (ref 22–29)
CREAT SERPL-MCNC: 2.69 MG/DL (ref 0.76–1.27)
GFR SERPL CREATININE-BSD FRML MDRD: 23 ML/MIN/1.73
GLUCOSE BLDC GLUCOMTR-MCNC: 119 MG/DL (ref 70–99)
GLUCOSE BLDC GLUCOMTR-MCNC: 164 MG/DL (ref 70–99)
GLUCOSE BLDC GLUCOMTR-MCNC: 165 MG/DL (ref 70–99)
GLUCOSE BLDC GLUCOMTR-MCNC: 176 MG/DL (ref 70–99)
GLUCOSE SERPL-MCNC: 169 MG/DL (ref 65–99)
POTASSIUM SERPL-SCNC: 4.7 MMOL/L (ref 3.5–5.2)
SODIUM SERPL-SCNC: 136 MMOL/L (ref 136–145)

## 2021-11-03 PROCEDURE — 63710000001 INSULIN DETEMIR PER 5 UNITS: Performed by: INTERNAL MEDICINE

## 2021-11-03 PROCEDURE — 25010000002 CEFTRIAXONE PER 250 MG: Performed by: INTERNAL MEDICINE

## 2021-11-03 PROCEDURE — 25010000002 HEPARIN (PORCINE) PER 1000 UNITS: Performed by: INTERNAL MEDICINE

## 2021-11-03 PROCEDURE — 63710000001 INSULIN LISPRO (HUMAN) PER 5 UNITS: Performed by: INTERNAL MEDICINE

## 2021-11-03 PROCEDURE — 25010000002 FUROSEMIDE PER 20 MG: Performed by: INTERNAL MEDICINE

## 2021-11-03 PROCEDURE — 82962 GLUCOSE BLOOD TEST: CPT

## 2021-11-03 PROCEDURE — 80048 BASIC METABOLIC PNL TOTAL CA: CPT | Performed by: INTERNAL MEDICINE

## 2021-11-03 PROCEDURE — 99233 SBSQ HOSP IP/OBS HIGH 50: CPT | Performed by: INTERNAL MEDICINE

## 2021-11-03 PROCEDURE — 94799 UNLISTED PULMONARY SVC/PX: CPT

## 2021-11-03 RX ORDER — POLYETHYLENE GLYCOL 3350 17 G/17G
17 POWDER, FOR SOLUTION ORAL DAILY
Status: DISCONTINUED | OUTPATIENT
Start: 2021-11-03 | End: 2021-11-09 | Stop reason: HOSPADM

## 2021-11-03 RX ORDER — DOCUSATE SODIUM 100 MG/1
100 CAPSULE, LIQUID FILLED ORAL 2 TIMES DAILY
Status: DISCONTINUED | OUTPATIENT
Start: 2021-11-03 | End: 2021-11-09 | Stop reason: HOSPADM

## 2021-11-03 RX ADMIN — GABAPENTIN 100 MG: 100 CAPSULE ORAL at 15:09

## 2021-11-03 RX ADMIN — GABAPENTIN 100 MG: 100 CAPSULE ORAL at 08:20

## 2021-11-03 RX ADMIN — SODIUM CHLORIDE, PRESERVATIVE FREE 10 ML: 5 INJECTION INTRAVENOUS at 21:19

## 2021-11-03 RX ADMIN — FUROSEMIDE 40 MG: 10 INJECTION INTRAMUSCULAR; INTRAVENOUS at 10:45

## 2021-11-03 RX ADMIN — HEPARIN SODIUM 5000 UNITS: 5000 INJECTION, SOLUTION INTRAVENOUS; SUBCUTANEOUS at 15:09

## 2021-11-03 RX ADMIN — CEFTRIAXONE SODIUM 1 G: 1 INJECTION, SOLUTION INTRAVENOUS at 15:11

## 2021-11-03 RX ADMIN — INSULIN LISPRO 2 UNITS: 100 INJECTION, SOLUTION INTRAVENOUS; SUBCUTANEOUS at 08:21

## 2021-11-03 RX ADMIN — ISOSORBIDE MONONITRATE 30 MG: 30 TABLET ORAL at 05:52

## 2021-11-03 RX ADMIN — INSULIN LISPRO 7 UNITS: 100 INJECTION, SOLUTION INTRAVENOUS; SUBCUTANEOUS at 08:21

## 2021-11-03 RX ADMIN — ASPIRIN 81 MG: 81 TABLET, COATED ORAL at 08:20

## 2021-11-03 RX ADMIN — INSULIN LISPRO 7 UNITS: 100 INJECTION, SOLUTION INTRAVENOUS; SUBCUTANEOUS at 11:20

## 2021-11-03 RX ADMIN — INSULIN LISPRO 2 UNITS: 100 INJECTION, SOLUTION INTRAVENOUS; SUBCUTANEOUS at 11:19

## 2021-11-03 RX ADMIN — COLCHICINE 0.6 MG: 0.6 TABLET, FILM COATED ORAL at 21:18

## 2021-11-03 RX ADMIN — MINERAL OIL, PETROLATUM: 425; 568 OINTMENT OPHTHALMIC at 08:26

## 2021-11-03 RX ADMIN — MINERAL OIL, PETROLATUM: 425; 568 OINTMENT OPHTHALMIC at 21:13

## 2021-11-03 RX ADMIN — METOPROLOL TARTRATE 100 MG: 50 TABLET, FILM COATED ORAL at 21:14

## 2021-11-03 RX ADMIN — METOPROLOL TARTRATE 100 MG: 50 TABLET, FILM COATED ORAL at 08:21

## 2021-11-03 RX ADMIN — HEPARIN SODIUM 5000 UNITS: 5000 INJECTION, SOLUTION INTRAVENOUS; SUBCUTANEOUS at 21:14

## 2021-11-03 RX ADMIN — DOCUSATE SODIUM 100 MG: 100 CAPSULE, LIQUID FILLED ORAL at 11:20

## 2021-11-03 RX ADMIN — LISINOPRIL 10 MG: 10 TABLET ORAL at 23:31

## 2021-11-03 RX ADMIN — DOCUSATE SODIUM 100 MG: 100 CAPSULE, LIQUID FILLED ORAL at 21:14

## 2021-11-03 RX ADMIN — INSULIN DETEMIR 30 UNITS: 100 INJECTION, SOLUTION SUBCUTANEOUS at 21:12

## 2021-11-03 RX ADMIN — ESCITALOPRAM OXALATE 5 MG: 10 TABLET ORAL at 08:20

## 2021-11-03 RX ADMIN — HYDRALAZINE HYDROCHLORIDE 50 MG: 50 TABLET, FILM COATED ORAL at 08:20

## 2021-11-03 RX ADMIN — GABAPENTIN 100 MG: 100 CAPSULE ORAL at 21:14

## 2021-11-03 RX ADMIN — COLCHICINE 0.6 MG: 0.6 TABLET, FILM COATED ORAL at 08:25

## 2021-11-03 RX ADMIN — FAMOTIDINE 20 MG: 20 TABLET, FILM COATED ORAL at 21:14

## 2021-11-03 RX ADMIN — HYDRALAZINE HYDROCHLORIDE 50 MG: 50 TABLET, FILM COATED ORAL at 21:19

## 2021-11-03 RX ADMIN — SODIUM CHLORIDE, PRESERVATIVE FREE 10 ML: 5 INJECTION INTRAVENOUS at 08:21

## 2021-11-03 RX ADMIN — POLYETHYLENE GLYCOL 3350 17 G: 17 POWDER, FOR SOLUTION ORAL at 11:20

## 2021-11-03 RX ADMIN — HEPARIN SODIUM 5000 UNITS: 5000 INJECTION, SOLUTION INTRAVENOUS; SUBCUTANEOUS at 05:52

## 2021-11-03 NOTE — PROGRESS NOTES
Patient Name: Betty Givens Date of Admission: 10/28/2021   : 1935 MRN: [id number]   Location: [facility]1 CSN: [id number]       [facility]   Follow Up  DATE: 11/3/2021  Primary Care Provider  Mauro Cuellar MD    Subjective   Subjective     Subjective  Patient seen on rounds this morning, supine in bed without any distress.  Sounds like he worked with PT some, perhaps was up on the side of the bed.  His edema is stable today.  He does not have any new complaints.  Case reviewed with RN at the bedside...Patient no distress, ate his whole lunch it looks like.  No new issues reported per staff.  Labs reviewed, med changes needed.  His exam is unchanged... Patient seen on rounds this morning, his family was at the bedside again and issues were discussed in detail.  Patient no distress, apparently did fine with breakfast.  He was not been seen by PT yesterday either.  Await formal PT eval and treatment, placement is needed.  His exam is unchanged ---> patient seen on rounds this morning 11/3, discussed with family at bedside, discussed with  several times a day as well.  Patient was hypersomnolent, exam.  Consultants notes reviewed.    Objective   Objective     ROS    Review of Systems   Reason unable to perform ROS: Patient basically deaf.   Constitutional: Positive for fatigue. Negative for fever.   HENT: Positive for hearing loss. Negative for dental problem, ear pain and trouble swallowing.    Eyes: Negative for pain and visual disturbance.   Respiratory: Positive for cough. Negative for shortness of breath.         Cough is improving.   Cardiovascular: Positive for leg swelling. Negative for chest pain and palpitations.   Gastrointestinal: Positive for abdominal distention, abdominal pain and constipation. Negative for diarrhea, nausea and vomiting.   Endocrine: Negative for cold intolerance and heat intolerance.   Genitourinary: Negative for dysuria, flank pain and  hematuria.   Musculoskeletal: Positive for arthralgias and gait problem. Negative for neck pain.   Skin: Negative for rash and wound.   Allergic/Immunologic: Negative for immunocompromised state.   Neurological: Positive for weakness. Negative for dizziness, syncope and headaches.   Psychiatric/Behavioral: Negative for agitation, confusion, self-injury and suicidal ideas. The patient is nervous/anxious.        Exam    Vitals Signs    Temp:  [97.4 °F (36.3 °C)-98.7 °F (37.1 °C)] 98.7 °F (37.1 °C)  Heart Rate:  [60-73] 66  Resp:  [18] 18  BP: ()/(43-57) 104/50    Intake & Output (last 3 days)       11/01 0701 11/02 0700 11/02 0701 11/03 0700 11/03 0701 11/04 0700    P.O.   460    Total Intake(mL/kg)   460 (3.9)    Urine (mL/kg/hr) 2000 (0.7) 1575 (0.6) 650 (0.4)    Stool   0    Total Output 2000 1575 650    Net -2000 -1575 -190           Stool Unmeasured Occurrence   1 x           Physical Exam  Constitutional:       General: He is not in acute distress.     Appearance: He is obese. He is ill-appearing. He is not toxic-appearing.      Comments: Chronically ill-appearing.   HENT:      Head: Atraumatic.      Comments: Profoundly hard of hearing.     Right Ear: External ear normal.      Left Ear: External ear normal.      Nose: Nose normal.      Mouth/Throat:      Mouth: Mucous membranes are moist.   Eyes:      General:         Right eye: No discharge.         Left eye: No discharge.      Extraocular Movements: Extraocular movements intact.      Pupils: Pupils are equal, round, and reactive to light.   Cardiovascular:      Rate and Rhythm: Normal rate and regular rhythm.      Pulses: Normal pulses.      Heart sounds: Normal heart sounds. No murmur heard.  No gallop.       Comments: No concerning ectopy, no S3.  Pulmonary:      Effort: Pulmonary effort is normal. No respiratory distress.      Breath sounds: No wheezing, rhonchi or rales.      Comments: Diminished breath sounds, but not labored and certainly much  clearer than when admitted.  Abdominal:      General: There is no distension.      Palpations: Abdomen is soft. There is no mass.      Tenderness: There is no abdominal tenderness. There is no guarding.   Musculoskeletal:         General: No swelling or tenderness.      Cervical back: No tenderness.      Right lower leg: Edema present.      Left lower leg: Edema present.      Comments: Improving edema, maybe 1+ as of 11/3 AM.   Skin:     General: Skin is warm and dry.      Findings: No rash.   Neurological:      General: No focal deficit present.      Mental Status: He is alert and oriented to person, place, and time. Mental status is at baseline.      Motor: Weakness present.      Gait: Gait abnormal.      Comments: Patient just globally weak.   Psychiatric:         Mood and Affect: Mood normal.      Comments: No agitation, just hard of hearing and therefore difficult to communicate with = ruben Edwards.         Labs:        Lab 10/29/21  0609 10/28/21  1742   WBC 7.20 7.84   HEMOGLOBIN 11.1* 10.5*   HEMATOCRIT 35.1* 33.0*   PLATELETS 275 262           Lab 11/03/21  0536 11/02/21  0550   SODIUM 136 136   POTASSIUM 4.7 4.3   CHLORIDE 97* 96*   CO2 26.9 26.6   BUN 71* 65*       Assessment / Plan     Current Medications    Scheduled Meds:artificial tears, , Left Eye, BID  aspirin, 81 mg, Oral, Daily  cefTRIAXone, 1 g, Intravenous, Q24H  colchicine, 0.6 mg, Oral, Q12H  cyanocobalamin, 1,000 mcg, Intramuscular, Q14 Days  docusate sodium, 100 mg, Oral, BID  escitalopram, 5 mg, Oral, Daily  famotidine, 20 mg, Oral, Nightly  gabapentin, 100 mg, Oral, TID  heparin (porcine), 5,000 Units, Subcutaneous, Q8H  hydrALAZINE, 50 mg, Oral, TID  insulin detemir, 30 Units, Subcutaneous, Nightly  insulin lispro, 0-9 Units, Subcutaneous, TID AC  insulin lispro, 7 Units, Subcutaneous, TID With Meals  isosorbide mononitrate, 30 mg, Oral, QAM  lisinopril, 10 mg, Oral, Q24H  metoprolol tartrate, 100 mg, Oral, BID  polyethylene glycol, 17 g,  Oral, Daily  sodium chloride, 10 mL, Intravenous, Q12H      Continuous Infusions:   PRN Meds:.•  acetaminophen **OR** acetaminophen **OR** acetaminophen  •  dextrose  •  dextrose  •  glucagon (human recombinant)  •  nitroglycerin  •  ondansetron  •  sodium chloride  •  sodium chloride     Impression    1.  Acute on chronic combined heart failure.  As noted patient with gradual onset of his symptoms, and his BNP is markedly elevated.  Of note his GFR is not far off of his baseline if at all.  So certainly agree with aggressive IV diuretics as written for.  Additionally we will continue his ACE inhibitor and beta-blocker as his blood pressure allows.  We will get Dr. Carbajal involved if not improving as expected...his renal function is bumped as of 10/31, so we will hold further doses of Lasix today and repeat labs in AM...Echo with EF of only 30 to 35% with no severe valvular abnormalities.  His lisinopril was not renewed on admission, so we will resume that now along with a lower dose of IV Lasix as of 11/1...I consulted with Dr. Carbajal this a.m. as of 11/2 in regards to further medication titration, in regards to possibly changing his beta-blocker and/or trying Entresto---> recommendations per cardiology to continue current plan of care noted, however creatinine bumped significantly today 11/3, so we will hold Lasix this evening and follow-up labs in AM.     2.  Coronary artery disease with possible type II MI.  Patient without any report of any chest pain per se, but he does have mildly elevated enzymes.  We will keep him on the telemetry monitor as a precaution.  Continue with his beta-blocker and his Imdur as an antianginal regimen as well---> still no chest pain, but as is obvious, patient is bedridden.     3.  Diabetes mellitus uncontrolled.  Patient is on 40 units of Lantus as well as moderate dose Metformin, moderate dose Actos, and near max dose of glimepiride at home.  Obviously will go with just  insulin in the hospital, he will probably need aggressive dosing...need to titrate insulin as of 11/1 and will follow up blood sugars in a few days---> blood sugars are in the 160s as of 11 3, so no changes indicated.    4.  Chronic kidney disease stage 3b-4.  We will monitor this closely on diuretics obviously...As noted, a little worse today after an improvement yesterday, so we will hold Lasix and follow-up in the a.m. 11/1...Okay to resume Lasix this evening at a decreased dose as of 11/1 and will follow up labs in the a.m 11/3---> creatinine bumped again 11/3, so hold IV Lasix tonight and follow-up labs in a.m.     5.  Hypertension.  Continue meds as blood pressure allows.     6.  Iron deficiency anemia as well as anemia related to chronic kidney disease.  Hemoglobin of 11 is made about a gram below his baseline.  Hopefully this is dilutional  Will monitor---> patient received IV Venofer x5 this admission, so we will repeat CBC in a.m. 11/4.     7.  Gait dysfunction.  Apparently the patient is nearly bedridden at home, barely able to help family with transfers to a wheelchair.  They and the patient report this is due to the significant bilateral foot pain, likely diabetic neuropathy.  We will start gabapentin, and check uric acid given his history of gout and their concerns for this.---> Patient is in need of NHP, and although he is hesitant to go to rehab, it is basically his only option, he is a rather large man, he is essentially bedridden, and his elderly family can no longer care for him.     8.    Acute gouty arthritis.  His foot pain is diffuse, and there is no localized erythema, so is obviously no related to out.  However his uric acid did come back extremely elevated, so I will try him on a few days of colchicine to see if that helps his symptoms some---> continue colchicine as of 11/3 for several more days before initiating allopurinol.    9. Depression.  Family reports that the patient threatened to  put a bag over his head and suffocate himself when he was in rehab at [facility].  Apparently he felt severely neglected.  Given his present status, he may very well end up back in the nursing home, we will start SSRI at this time.     10.  DVT prophylaxis.  Via subQ heparin given his chronic kidney disease.     11. PUD prophylaxis.  Via H2 blocker.    Problem List    Active Problems:    Acute congestive heart failure (HCC)      Plan:  as above      Electronically signed by Mauro Cuellar MD, 11/3/2021, 19:52 EDT.

## 2021-11-03 NOTE — PLAN OF CARE
Goal Outcome Evaluation:  Plan of Care Reviewed With: patient        Progress: no change  Outcome Summary: Patient had no complaints of pain throughout shift. Patient's sutures from back incision were removed per wound care RN. Patient tolerated well. Patient had not voided on shift, MD placed order for urinary catheter due to urinary obstruction. Patient tolerated urinary catheter placement well. Patient was very fatigued and slept on and off during shift.

## 2021-11-03 NOTE — PLAN OF CARE
Goal Outcome Evaluation:           Progress: no change  Outcome Summary: No complaints this shift. Tolerating wound care without difficulty.

## 2021-11-03 NOTE — SIGNIFICANT NOTE
11/03/21 1425   Wound 10/29/21 1417 Left upper back Other (comment)   Placement Date/Time: 10/29/21 1417   Present on Hospital Admission: Yes  Side: Left  Orientation: upper  Location: back  Primary Wound Type: (c) Other (comment)   Dressing Appearance dressing loose   Closure Sutures   Base pink; blanchable   Periwound intact; pink   Edges rolled/closed   Drainage Amount none   Care, Wound cleansed with; sterile normal saline   Dressing Care dressing applied; non-adherent; silicone; border dressing   Sutures Removed Intact Yes   Number of Sutures Removed 1   Wound Check: Received request from Primary RN to assist with suture removal. Order verified. Dressing previously applied had slid off incision. Running suture removed from incision site to left upper back. Cleansed area with NS and gauze. No drainage noted to gauze. Primary RN with concerns over yellow drainage previously seen draining from wound while sitting. Yellow drainage is visible to linens from possible prior drainage, but no drainage seen with removal of suture of with cleansing. Upon removal of suture, tissue remains pink and approximated. Non-adherent gauze and silicone border dressing applied.   Helena Bai RN

## 2021-11-03 NOTE — PROGRESS NOTES
[facility]     Progress Note    Patient Name: Betty Givens  : 1935  MRN: [id number]  Primary Care Physician:  Mauro Cuellar MD  Date of admission: 10/28/2021    Subjective   Subjective       Breathing better  Creatinine has gone up some to 2.69  No chest pains    Review of Systems  Review of Systems   Constitutional: Negative for appetite change, fatigue and fever.   HENT: Negative for congestion.    Respiratory: Negative for apnea, choking, chest tightness, shortness of breath and wheezing.    Cardiovascular: Negative for chest pain, palpitations and leg swelling.   Gastrointestinal: Negative for diarrhea, nausea and vomiting.   Genitourinary: Negative for dysuria, frequency and hematuria.   Musculoskeletal: Positive for arthralgias. Negative for myalgias.   Skin: Negative for pallor.   Neurological: Negative for dizziness, tremors, syncope and weakness.   Psychiatric/Behavioral: Negative for agitation and confusion.       Objective   Objective     Vitals:   Temp:  [97.4 °F (36.3 °C)-98.7 °F (37.1 °C)] 98.7 °F (37.1 °C)  Heart Rate:  [60-73] 66  Resp:  [18] 18  BP: ()/(43-57) 104/50    Physical Exam    Constitutional: Awake, alert   Eyes: PERRLA, sclerae anicteric, no conjunctival injection   HENT: NCAT, mucous membranes moist   Neck: Supple, no thyromegaly, no lymphadenopathy, trachea midline   Respiratory: Clear to auscultation bilaterally, nonlabored respirations    Cardiovascular: RRR, no murmurs, rubs, or gallops, palpable pedal pulses bilaterally   Gastrointestinal: Positive bowel sounds, soft, nontender, nondistended   Musculoskeletal: No bilateral ankle edema, no clubbing or cyanosis to extremities   Psychiatric: Appropriate affect, cooperative   Neurologic: Oriented x 3, strength symmetric in all extremities, Cranial Nerves grossly intact to confrontation, speech clear   Skin: No rashes       Result Review      Result Review:  I have personally reviewed the results from the time  of this admission to 11/03/21 7:34 PM EDT and agree with these findings:  [x]  Laboratory  [x]  Microbiology  [x]  Radiology  [x]  EKG/Telemetry   []  Cardiology/Vascular   []  Pathology  []  Old records  []  Other:        Assessment/Plan   Assessment / Plan     Active Hospital Problems:  Active Hospital Problems    Diagnosis    • Acute congestive heart failure (HCC)    Acute on chronic combined heart failure  Diabetes mellitus uncontrolled  Chronic kidney disease stage IIIb  Hypertension  Depression    Plan:  Continue diuresis keeping a close watch on the renal function  PT OT for ambulation    DVT prophylaxis:  Medical DVT prophylaxis orders are present.      Time spent including face to face service excluding procedures: 20 minutes          Electronically signed by Beck Carbajal MD, 11/03/21, 7:34 PM EDT.

## 2021-11-04 LAB
ANION GAP SERPL CALCULATED.3IONS-SCNC: 11.8 MMOL/L (ref 5–15)
BASOPHILS # BLD AUTO: 0.06 10*3/MM3 (ref 0–0.2)
BASOPHILS NFR BLD AUTO: 0.9 % (ref 0–1.5)
BUN SERPL-MCNC: 81 MG/DL (ref 8–23)
BUN/CREAT SERPL: 28.2 (ref 7–25)
CALCIUM SPEC-SCNC: 8.6 MG/DL (ref 8.6–10.5)
CHLORIDE SERPL-SCNC: 98 MMOL/L (ref 98–107)
CO2 SERPL-SCNC: 24.2 MMOL/L (ref 22–29)
CREAT SERPL-MCNC: 2.87 MG/DL (ref 0.76–1.27)
DEPRECATED RDW RBC AUTO: 55.1 FL (ref 37–54)
EOSINOPHIL # BLD AUTO: 0.49 10*3/MM3 (ref 0–0.4)
EOSINOPHIL NFR BLD AUTO: 7.3 % (ref 0.3–6.2)
ERYTHROCYTE [DISTWIDTH] IN BLOOD BY AUTOMATED COUNT: 17.1 % (ref 12.3–15.4)
GFR SERPL CREATININE-BSD FRML MDRD: 21 ML/MIN/1.73
GLUCOSE BLDC GLUCOMTR-MCNC: 135 MG/DL (ref 70–99)
GLUCOSE BLDC GLUCOMTR-MCNC: 141 MG/DL (ref 70–99)
GLUCOSE BLDC GLUCOMTR-MCNC: 175 MG/DL (ref 70–99)
GLUCOSE BLDC GLUCOMTR-MCNC: 190 MG/DL (ref 70–99)
GLUCOSE SERPL-MCNC: 137 MG/DL (ref 65–99)
HCT VFR BLD AUTO: 37.1 % (ref 37.5–51)
HGB BLD-MCNC: 11.8 G/DL (ref 13–17.7)
IMM GRANULOCYTES # BLD AUTO: 0.12 10*3/MM3 (ref 0–0.05)
IMM GRANULOCYTES NFR BLD AUTO: 1.8 % (ref 0–0.5)
LYMPHOCYTES # BLD AUTO: 1.56 10*3/MM3 (ref 0.7–3.1)
LYMPHOCYTES NFR BLD AUTO: 23.3 % (ref 19.6–45.3)
MCH RBC QN AUTO: 28.3 PG (ref 26.6–33)
MCHC RBC AUTO-ENTMCNC: 31.8 G/DL (ref 31.5–35.7)
MCV RBC AUTO: 89 FL (ref 79–97)
MONOCYTES # BLD AUTO: 0.65 10*3/MM3 (ref 0.1–0.9)
MONOCYTES NFR BLD AUTO: 9.7 % (ref 5–12)
NEUTROPHILS NFR BLD AUTO: 3.81 10*3/MM3 (ref 1.7–7)
NEUTROPHILS NFR BLD AUTO: 57 % (ref 42.7–76)
NRBC BLD AUTO-RTO: 0 /100 WBC (ref 0–0.2)
NT-PROBNP SERPL-MCNC: 9915 PG/ML (ref 0–1800)
PLATELET # BLD AUTO: 409 10*3/MM3 (ref 140–450)
PMV BLD AUTO: 8.4 FL (ref 6–12)
POTASSIUM SERPL-SCNC: 4.3 MMOL/L (ref 3.5–5.2)
RBC # BLD AUTO: 4.17 10*6/MM3 (ref 4.14–5.8)
SODIUM SERPL-SCNC: 134 MMOL/L (ref 136–145)
WBC # BLD AUTO: 6.69 10*3/MM3 (ref 3.4–10.8)

## 2021-11-04 PROCEDURE — 82962 GLUCOSE BLOOD TEST: CPT

## 2021-11-04 PROCEDURE — 97110 THERAPEUTIC EXERCISES: CPT

## 2021-11-04 PROCEDURE — 63710000001 INSULIN LISPRO (HUMAN) PER 5 UNITS: Performed by: INTERNAL MEDICINE

## 2021-11-04 PROCEDURE — 83880 ASSAY OF NATRIURETIC PEPTIDE: CPT | Performed by: INTERNAL MEDICINE

## 2021-11-04 PROCEDURE — 63710000001 INSULIN DETEMIR PER 5 UNITS: Performed by: INTERNAL MEDICINE

## 2021-11-04 PROCEDURE — 94799 UNLISTED PULMONARY SVC/PX: CPT

## 2021-11-04 PROCEDURE — 80048 BASIC METABOLIC PNL TOTAL CA: CPT | Performed by: INTERNAL MEDICINE

## 2021-11-04 PROCEDURE — 25010000002 HEPARIN (PORCINE) PER 1000 UNITS: Performed by: INTERNAL MEDICINE

## 2021-11-04 PROCEDURE — 25010000002 CEFTRIAXONE PER 250 MG: Performed by: INTERNAL MEDICINE

## 2021-11-04 PROCEDURE — 85025 COMPLETE CBC W/AUTO DIFF WBC: CPT | Performed by: INTERNAL MEDICINE

## 2021-11-04 PROCEDURE — 99233 SBSQ HOSP IP/OBS HIGH 50: CPT | Performed by: INTERNAL MEDICINE

## 2021-11-04 RX ADMIN — INSULIN DETEMIR 30 UNITS: 100 INJECTION, SOLUTION SUBCUTANEOUS at 21:27

## 2021-11-04 RX ADMIN — COLCHICINE 0.6 MG: 0.6 TABLET, FILM COATED ORAL at 08:23

## 2021-11-04 RX ADMIN — GABAPENTIN 100 MG: 100 CAPSULE ORAL at 08:23

## 2021-11-04 RX ADMIN — SODIUM CHLORIDE, PRESERVATIVE FREE 10 ML: 5 INJECTION INTRAVENOUS at 08:23

## 2021-11-04 RX ADMIN — HYDRALAZINE HYDROCHLORIDE 50 MG: 50 TABLET, FILM COATED ORAL at 16:43

## 2021-11-04 RX ADMIN — SODIUM CHLORIDE, PRESERVATIVE FREE 10 ML: 5 INJECTION INTRAVENOUS at 20:59

## 2021-11-04 RX ADMIN — HEPARIN SODIUM 5000 UNITS: 5000 INJECTION, SOLUTION INTRAVENOUS; SUBCUTANEOUS at 06:09

## 2021-11-04 RX ADMIN — GABAPENTIN 100 MG: 100 CAPSULE ORAL at 16:43

## 2021-11-04 RX ADMIN — INSULIN LISPRO 7 UNITS: 100 INJECTION, SOLUTION INTRAVENOUS; SUBCUTANEOUS at 08:22

## 2021-11-04 RX ADMIN — ASPIRIN 81 MG: 81 TABLET, COATED ORAL at 08:23

## 2021-11-04 RX ADMIN — LISINOPRIL 10 MG: 10 TABLET ORAL at 22:45

## 2021-11-04 RX ADMIN — HEPARIN SODIUM 5000 UNITS: 5000 INJECTION, SOLUTION INTRAVENOUS; SUBCUTANEOUS at 13:50

## 2021-11-04 RX ADMIN — FAMOTIDINE 20 MG: 20 TABLET, FILM COATED ORAL at 20:58

## 2021-11-04 RX ADMIN — INSULIN LISPRO 7 UNITS: 100 INJECTION, SOLUTION INTRAVENOUS; SUBCUTANEOUS at 11:50

## 2021-11-04 RX ADMIN — MINERAL OIL, PETROLATUM: 425; 568 OINTMENT OPHTHALMIC at 21:27

## 2021-11-04 RX ADMIN — MINERAL OIL, PETROLATUM: 425; 568 OINTMENT OPHTHALMIC at 09:21

## 2021-11-04 RX ADMIN — DOCUSATE SODIUM 100 MG: 100 CAPSULE, LIQUID FILLED ORAL at 08:23

## 2021-11-04 RX ADMIN — COLCHICINE 0.6 MG: 0.6 TABLET, FILM COATED ORAL at 21:27

## 2021-11-04 RX ADMIN — INSULIN LISPRO 7 UNITS: 100 INJECTION, SOLUTION INTRAVENOUS; SUBCUTANEOUS at 17:46

## 2021-11-04 RX ADMIN — INSULIN LISPRO 2 UNITS: 100 INJECTION, SOLUTION INTRAVENOUS; SUBCUTANEOUS at 11:50

## 2021-11-04 RX ADMIN — ESCITALOPRAM OXALATE 5 MG: 10 TABLET ORAL at 08:23

## 2021-11-04 RX ADMIN — METOPROLOL TARTRATE 100 MG: 50 TABLET, FILM COATED ORAL at 20:58

## 2021-11-04 RX ADMIN — HEPARIN SODIUM 5000 UNITS: 5000 INJECTION, SOLUTION INTRAVENOUS; SUBCUTANEOUS at 21:04

## 2021-11-04 RX ADMIN — GABAPENTIN 100 MG: 100 CAPSULE ORAL at 20:58

## 2021-11-04 RX ADMIN — POLYETHYLENE GLYCOL 3350 17 G: 17 POWDER, FOR SOLUTION ORAL at 08:22

## 2021-11-04 RX ADMIN — CEFTRIAXONE SODIUM 1 G: 1 INJECTION, SOLUTION INTRAVENOUS at 16:42

## 2021-11-04 RX ADMIN — METOPROLOL TARTRATE 100 MG: 50 TABLET, FILM COATED ORAL at 08:23

## 2021-11-04 RX ADMIN — HYDRALAZINE HYDROCHLORIDE 50 MG: 50 TABLET, FILM COATED ORAL at 21:27

## 2021-11-04 NOTE — THERAPY TREATMENT NOTE
Acute Care - Physical Therapy Treatment Note  SHARON Dueñas     Patient Name: Betty Givens  : 1935  MRN: [id number]  Today's Date: 2021      Visit Dx:     ICD-10-CM ICD-9-CM   1. Acute congestive heart failure, unspecified heart failure type (HCC)  I50.9 428.0   2. B12 deficiency  E53.8 266.2   3. Decreased activities of daily living (ADL)  Z78.9 V49.89   4. Difficulty in walking  R26.2 719.7     Patient Active Problem List   Diagnosis   • Essential hypertension   • Mixed hyperlipidemia   • Non-pressure chronic ulcer of other part of left foot with unspecified severity (HCC)   • Ulcer of foot due to type 2 diabetes mellitus (HCC)   • Type 2 diabetes mellitus without complication, with long-term current use of insulin (HCC)   • Stage 3b chronic kidney disease ([facility])   • Atherosclerosis of native coronary artery of native heart without angina pectoris   • Chronic combined systolic (congestive) and diastolic (congestive) heart failure (HCC)   • Primary osteoarthritis involving multiple joints   • Iron deficiency anemia secondary to inadequate dietary iron intake   • Other fatigue   • Acute idiopathic gout of right hand   • Acute congestive heart failure (HCC)     Past Medical History:   Diagnosis Date   • Anasarca    • Anemia, unspecified    • Atherosclerotic heart disease of native coronary artery without angina pectoris    • Chronic diastolic (congestive) heart failure (HCC)    • Chronic kidney disease, stage 3 ([facility])     moderate   • DM (diabetes mellitus) ([facility])    • Essential (primary) hypertension    • Family history of colonic polyps    • Hypercholesterolemia    • Mixed hyperlipidemia    • Non-pressure chronic ulcer of other part of left foot with unspecified severity (HCC)    • Pure hypercholesterolemia    • Tachycardia    • Type 2 diabetes mellitus with diabetic chronic kidney disease (HCC)     without complications; with foot ulcer   • Unspecified osteoarthritis, unspecified site      Past  Surgical History:   Procedure Laterality Date   • CATARACT EXTRACTION Left    • CHOLECYSTECTOMY     • EYE SURGERY Right    • SKIN CANCER DESTRUCTION  06/2019;6/2018    removal from hand; forehead      PT Assessment (last 12 hours)     PT Evaluation and Treatment     Row Name 11/04/21 0958          Physical Therapy Time and Intention    Subjective Information no complaints  -WM     Document Type therapy note (daily note)  -     Mode of Treatment individual therapy; physical therapy  -WM     Total Minutes, Physical Therapy 14  -     Patient Effort adequate  -     Symptoms Noted During/After Treatment fatigue  -     Row Name 11/04/21 0958          Cognition    Affect/Mental Status (Cognitive) unable/difficult to assess  -     Row Name 11/04/21 0958          Motor Skills    Therapeutic Exercise hip; knee; ankle  -     Row Name 11/04/21 0958          Hip (Therapeutic Exercise)    Hip (Therapeutic Exercise) strengthening exercise  -     Hip Strengthening (Therapeutic Exercise) bilateral; aBduction; aDduction; heel slides; supine; 10 repetitions; 2 sets  -     Row Name 11/04/21 0958          Knee (Therapeutic Exercise)    Knee (Therapeutic Exercise) strengthening exercise  -     Knee Strengthening (Therapeutic Exercise) bilateral; SAQ (short arc quad); supine; 10 repetitions; 2 sets  -     Row Name 11/04/21 0958          Ankle (Therapeutic Exercise)    Ankle (Therapeutic Exercise) AAROM (active assistive range of motion)  -     Ankle AAROM (Therapeutic Exercise) bilateral; dorsiflexion; plantarflexion; supine; 10 repetitions; 2 sets  -     Row Name             Wound 10/29/21 1413 Bilateral medial gluteal Pressure Injury    Wound - Properties Group Placement Date: 10/29/21  -KT Placement Time: 1413  -KT Present on Hospital Admission: --  -KT, Per sister, it was present before admission  Side: Bilateral  -KT Orientation: medial  -KT Location: gluteal  -KT Primary Wound Type: Pressure inj  -KT      Retired Wound - Properties Group Date first assessed: 10/29/21  -KT Time first assessed: 1413  -KT Present on Hospital Admission: --  -KT, Per sister, it was present before admission  Side: Bilateral  -KT Location: gluteal  -KT Primary Wound Type: Pressure inj  -KT     Row Name             Wound 10/29/21 1417 Left upper back Other (comment)    Wound - Properties Group Placement Date: 10/29/21  -KT Placement Time: 1417  -KT Present on Hospital Admission: Y  -KT Side: Left  -KT Orientation: upper  -KT Location: back  -KT Primary Wound Type: Other  -KT, surgery incision      Retired Wound - Properties Group Date first assessed: 10/29/21  -KT Time first assessed: 1417  -KT Present on Hospital Admission: Y  -KT Side: Left  -KT Location: back  -KT Primary Wound Type: Other  -KT, surgery incision      Row Name 11/04/21 0958          Therapy Assessment/Plan (PT)    Criteria for Skilled Interventions Met (PT) yes; meets criteria  -WM     Activity Limitations Related to Problem List (PT) unable to ambulate safely; unable to transfer safely  -WM     Row Name 11/04/21 0958          Progress Summary (PT)    Progress Toward Functional Goals (PT) progress toward functional goals is fair  -WM           User Key  (r) = Recorded By, (t) = Taken By, (c) = Cosigned By    Initials Name Provider Type    WM Anibal Early PTA Physical Therapy Assistant    Hamida David RNA Registered Nurse              Physical Therapy Education                 Title: PT OT SLP Therapies (Done)     Topic: Physical Therapy (Done)     Point: Mobility training (Done)     Learning Progress Summary           Patient Acceptance, E,TB, VU by AC at 11/2/2021 1516                   Point: Home exercise program (Done)     Learning Progress Summary           Patient Acceptance, E,TB, VU by AC at 11/2/2021 1516                   Point: Body mechanics (Done)     Learning Progress Summary           Patient Acceptance, E,TB, VU by AC at 11/2/2021 1516                    Point: Precautions (Done)     Learning Progress Summary           Patient Acceptance, E,TB, VU by AC at 11/2/2021 1516                               User Key     Initials Effective Dates Name Provider Type Jamari SHEN 06/03/21 -  Nigel Mccloud, PT Physical Therapist PT              PT Recommendation and Plan  Therapy Frequency (PT): daily  Progress Summary (PT)  Progress Toward Functional Goals (PT): progress toward functional goals is fair   Outcome Measures     Row Name 11/04/21 1002 11/02/21 1516          How much help from another person do you currently need...    Turning from your back to your side while in flat bed without using bedrails? 3  -WM 3  -AC     Moving from lying on back to sitting on the side of a flat bed without bedrails? 3  -WM 3  -AC     Moving to and from a bed to a chair (including a wheelchair)? 2  -WM 2  -AC     Standing up from a chair using your arms (e.g., wheelchair, bedside chair)? 2  -WM 2  -AC     Climbing 3-5 steps with a railing? 1  -WM 1  -AC     To walk in hospital room? 2  -WM 2  -AC     AM-PAC 6 Clicks Score (PT) 13  -WM 13  -AC            Functional Assessment    Outcome Measure Options -- AM-PAC 6 Clicks Basic Mobility (PT)  -AC           User Key  (r) = Recorded By, (t) = Taken By, (c) = Cosigned By    Initials Name Provider Type    Anibal Bender PTA Physical Therapy Assistant    Nigel Schmid, PT Physical Therapist                 Time Calculation:    PT Charges     Row Name 11/04/21 0958             Time Calculation    PT Received On 11/04/21  -WM              Timed Charges    32420 - PT Therapeutic Exercise Minutes 14  -WM              Total Minutes    Timed Charges Total Minutes 14  -WM       Total Minutes 14  -WM            User Key  (r) = Recorded By, (t) = Taken By, (c) = Cosigned By    Initials Name Provider Type    Anibal Bender PTA Physical Therapy Assistant              Therapy Charges for Today     Code Description Service Date  Service Provider Modifiers Qty    21303633587 HC PT THER PROC EA 15 MIN 11/4/2021 Anibal Early, PTA GP 1          PT G-Codes  Outcome Measure Options: AM-PAC 6 Clicks Basic Mobility (PT)  AM-PAC 6 Clicks Score (PT): 13  AM-PAC 6 Clicks Score (OT): 11    Anibal Early PTA  11/4/2021

## 2021-11-04 NOTE — PROGRESS NOTES
"Patient Name: Betty Givens Date of Admission: 10/28/2021   : 1935 MRN: [id number]   Location: [place] [address]/1 CSN: [id number]       [facility]   Follow Up  DATE: 2021  Primary Care Provider  Mauro Cuellar MD    Subjective   Subjective     Subjective  Patient seen on rounds this morning, supine in bed without any distress.  Sounds like he worked with PT some, perhaps was up on the side of the bed.  His edema is stable today.  He does not have any new complaints.  Case reviewed with RN at the bedside...Patient no distress, ate his whole lunch it looks like.  No new issues reported per staff.  Labs reviewed, med changes needed.  His exam is unchanged... Patient seen on rounds this morning, his family was at the bedside again and issues were discussed in detail.  Patient no distress, apparently did fine with breakfast.  He was not been seen by PT yesterday either.  Await formal PT eval and treatment, placement is needed.  His exam is unchanged ...patient seen on rounds this morning 11/3, discussed with family at bedside, discussed with  several times a day as well.  Patient was hypersomnolent, exam.  Consultants notes reviewed.---> Patient no distress , I discussed with him the need for inpatient rehab, discussed with him that he is too weak to return home that no one could care for him at home in his current state, that he needs significant assistance only available in a medical facility, patient said \"what ever\".  Discussed with family at bedside as well.  Awaiting nursing home placement.    Objective   Objective     ROS    Review of Systems   Reason unable to perform ROS: Patient basically deaf.   Constitutional: Positive for fatigue. Negative for fever.   HENT: Positive for hearing loss. Negative for dental problem, ear pain and trouble swallowing.    Eyes: Negative for pain and visual disturbance.   Respiratory: Positive for cough. Negative for shortness of breath.     "     Cough is improving.   Cardiovascular: Positive for leg swelling. Negative for chest pain and palpitations.   Gastrointestinal: Positive for abdominal distention, abdominal pain and constipation. Negative for diarrhea, nausea and vomiting.   Endocrine: Negative for cold intolerance and heat intolerance.   Genitourinary: Negative for dysuria, flank pain and hematuria.   Musculoskeletal: Positive for arthralgias and gait problem. Negative for neck pain.   Skin: Negative for rash and wound.   Allergic/Immunologic: Negative for immunocompromised state.   Neurological: Positive for weakness. Negative for dizziness, syncope and headaches.   Psychiatric/Behavioral: Negative for agitation, confusion, self-injury and suicidal ideas. The patient is nervous/anxious.        Exam    Vitals Signs    Temp:  [97.6 °F (36.4 °C)-98.7 °F (37.1 °C)] 98 °F (36.7 °C)  Heart Rate:  [65-68] 65  Resp:  [18] 18  BP: ()/(44-72) 121/53    Intake & Output (last 3 days)       11/01 0701 11/02 0700 11/02 0701 11/03 0700 11/03 0701 11/04 0700 11/04 0701  11/05 0700    P.O.   460 360    Total Intake(mL/kg)   460 (3.9) 360 (3.1)    Urine (mL/kg/hr) 2000 (0.7) 1575 (0.6) 650 (0.2)     Stool   0     Total Output 2000 1575 650     Net -2000 -1575 -190 +360            Stool Unmeasured Occurrence   1 x            Physical Exam  Constitutional:       General: He is not in acute distress.     Appearance: He is obese. He is ill-appearing. He is not toxic-appearing.      Comments: Chronically ill-appearing.   HENT:      Head: Atraumatic.      Comments: Profoundly hard of hearing.     Right Ear: External ear normal.      Left Ear: External ear normal.      Nose: Nose normal.      Mouth/Throat:      Mouth: Mucous membranes are moist.   Eyes:      General:         Right eye: No discharge.         Left eye: No discharge.      Extraocular Movements: Extraocular movements intact.      Pupils: Pupils are equal, round, and reactive to light.    Cardiovascular:      Rate and Rhythm: Normal rate and regular rhythm.      Pulses: Normal pulses.      Heart sounds: Normal heart sounds. No murmur heard.  No gallop.       Comments: No concerning ectopy, no S3.  Pulmonary:      Effort: Pulmonary effort is normal. No respiratory distress.      Breath sounds: No wheezing, rhonchi or rales.      Comments: Diminished breath sounds, but not labored and certainly much clearer than when admitted.  Abdominal:      General: There is no distension.      Palpations: Abdomen is soft. There is no mass.      Tenderness: There is no abdominal tenderness. There is no guarding.   Musculoskeletal:         General: No swelling or tenderness.      Cervical back: No tenderness.      Right lower leg: Edema present.      Left lower leg: Edema present.      Comments: Improving edema, maybe 1+ as of 11/3 AM.   Skin:     General: Skin is warm and dry.      Findings: No rash.   Neurological:      General: No focal deficit present.      Mental Status: He is alert and oriented to person, place, and time. Mental status is at baseline.      Motor: Weakness present.      Gait: Gait abnormal.      Comments: Patient just globally weak.   Psychiatric:         Mood and Affect: Mood normal.      Comments: No agitation, just hard of hearing and therefore difficult to communicate with = ruben 1130.         Labs:        Lab 11/04/21  0650 10/29/21  0609   WBC 6.69 7.20   HEMOGLOBIN 11.8* 11.1*   HEMATOCRIT 37.1* 35.1*   PLATELETS 409 275           Lab 11/04/21  0650 11/03/21  0536   SODIUM 134* 136   POTASSIUM 4.3 4.7   CHLORIDE 98 97*   CO2 24.2 26.9   BUN 81* 71*       Assessment / Plan     Current Medications    Scheduled Meds:artificial tears, , Left Eye, BID  aspirin, 81 mg, Oral, Daily  cefTRIAXone, 1 g, Intravenous, Q24H  colchicine, 0.6 mg, Oral, Q12H  cyanocobalamin, 1,000 mcg, Intramuscular, Q14 Days  docusate sodium, 100 mg, Oral, BID  escitalopram, 5 mg, Oral, Daily  famotidine, 20 mg,  Oral, Nightly  gabapentin, 100 mg, Oral, TID  heparin (porcine), 5,000 Units, Subcutaneous, Q8H  hydrALAZINE, 50 mg, Oral, TID  insulin detemir, 30 Units, Subcutaneous, Nightly  insulin lispro, 0-9 Units, Subcutaneous, TID AC  insulin lispro, 7 Units, Subcutaneous, TID With Meals  isosorbide mononitrate, 30 mg, Oral, QAM  lisinopril, 10 mg, Oral, Q24H  metoprolol tartrate, 100 mg, Oral, BID  polyethylene glycol, 17 g, Oral, Daily  sodium chloride, 10 mL, Intravenous, Q12H      Continuous Infusions:   PRN Meds:.•  acetaminophen **OR** acetaminophen **OR** acetaminophen  •  dextrose  •  dextrose  •  glucagon (human recombinant)  •  nitroglycerin  •  ondansetron  •  sodium chloride  •  sodium chloride     Impression    1.  Acute on chronic combined heart failure.  As noted patient with gradual onset of his symptoms, and his BNP is markedly elevated.  Of note his GFR is not far off of his baseline if at all.  So certainly agree with aggressive IV diuretics as written for.  Additionally we will continue his ACE inhibitor and beta-blocker as his blood pressure allows.  We will get Dr. Carbajal involved if not improving as expected...his renal function is bumped as of 10/31, so we will hold further doses of Lasix today and repeat labs in AM...Echo with EF of only 30 to 35% with no severe valvular abnormalities.  His lisinopril was not renewed on admission, so we will resume that now along with a lower dose of IV Lasix as of 11/1...I consulted with Dr. Carbajal this a.m. as of 11/2 in regards to further medication titration, in regards to possibly changing his beta-blocker and/or trying Entresto---> recommendations per cardiology to continue current plan of care noted, however creatinine bumped significantly today 11/3, so we will hold Lasix this evening and follow-up labs in AM = creatinine is now up from his 1.9 baseline to 2.9, his edema has significantly improved, he is not hypoxic, so we will hold Lasix for now,  monitor labs closely, when his creatinine recovers, will resume chronic p.o. Lasix.     2.  Coronary artery disease with possible type II MI.  Patient without any report of any chest pain per se, but he does have mildly elevated enzymes.  We will keep him on the telemetry monitor as a precaution.  Continue with his beta-blocker and his Imdur as an antianginal regimen as well...still no chest pain, but as is obvious, patient is bedridden.     3.  Diabetes mellitus uncontrolled.  Patient is on 40 units of Lantus as well as moderate dose Metformin, moderate dose Actos, and near max dose of glimepiride at home.  Obviously will go with just insulin in the hospital, he will probably need aggressive dosing...need to titrate insulin as of 11/1 and will follow up blood sugars in a few days---> blood sugars are in the 120-170 ballpark as of 11/4, so no changes indicated.    4.  Chronic kidney disease stage 3b-4.  We will monitor this closely on diuretics obviously...As noted, a little worse today after an improvement yesterday, so we will hold Lasix and follow-up in the a.m. 11/1...Okay to resume Lasix this evening at a decreased dose as of 11/1 and will follow up labs in the a.m 11/3---> as noted above, creatinine 2.9, holding Lasix, daily BMP.     5.  Hypertension.  Continue meds as blood pressure allows---> nursing informing 11/4 that they do have to hold them intermittently, and that certainly fine..     6.  Iron deficiency anemia as well as anemia related to chronic kidney disease.  Hemoglobin of 11 is made about a gram below his baseline.  Hopefully this is dilutional  Will monitor---> patient received IV Venofer x5 this admission and is follow-up hemoglobin is 11.8 as of 11/4.     7.  Gait dysfunction.  Apparently the patient is nearly bedridden at home, barely able to help family with transfers to a wheelchair.  They and the patient report this is due to the significant bilateral foot pain, likely diabetic neuropathy.   We will start gabapentin, and check uric acid given his history of gout and their concerns for this.---> Patient is in need of NHP, and although he is hesitant to go to rehab, it is basically his only option, he is a rather large man, he is essentially bedridden, and his elderly family can no longer care for him, so we are awaiting nursing home placement as of 11/4.     8.    Acute gouty arthritis.  His foot pain is diffuse, and there is no localized erythema, so is obviously no related to out.  However his uric acid did come back extremely elevated, so I will try him on a few days of colchicine to see if that helps his symptoms some---> continue colchicine as of 11/4 for several more days before initiating allopurinol.    9. Depression.  Family reports that the patient threatened to put a bag over his head and suffocate himself when he was in rehab at [facility].  Apparently he felt severely neglected.  Given his present status, he may very well end up back in the nursing home, we will start SSRI at this time.     10.  DVT prophylaxis.  Via subQ heparin given his chronic kidney disease.     11. PUD prophylaxis.  Via H2 blocker.    Problem List    Active Problems:    Acute congestive heart failure (HCC)      Plan:  as above      Electronically signed by Mauro Cuellar MD, 11/4/2021, 10:02 EDT.

## 2021-11-04 NOTE — PLAN OF CARE
Goal Outcome Evaluation:      Patient AAO.  Morning Imdur and Hydralazine held due to decreased BP, this was discussed with Dr Cuellar. Patient continues to sleep most of day.

## 2021-11-04 NOTE — PLAN OF CARE
Goal Outcome Evaluation:  Plan of Care Reviewed With: patient        Progress: no change  Outcome Summary: No complaints of pain this shift. Shultz draining adequately. Patient slept most of shift. VSS

## 2021-11-04 NOTE — PROGRESS NOTES
[facility]     Progress Note    Patient Name: Betty Givens  : 1935  MRN: [id number]  Primary Care Physician:  Mauro Cuellar MD  Date of admission: 10/28/2021    Subjective   Subjective       Breathing better  patient is slightly drowsy and lethargic but answers questions  Creatinine has gone up some to 2.87  No chest pains    Review of Systems  Review of Systems   Constitutional: Negative for appetite change, fatigue and fever.   HENT: Negative for congestion.    Respiratory: Negative for apnea, choking, chest tightness, shortness of breath and wheezing.    Cardiovascular: Negative for chest pain, palpitations and leg swelling.   Gastrointestinal: Negative for diarrhea, nausea and vomiting.   Genitourinary: Negative for dysuria, frequency and hematuria.   Musculoskeletal: Positive for arthralgias. Negative for myalgias.   Skin: Negative for pallor.   Neurological: Negative for dizziness, tremors, syncope and weakness.   Psychiatric/Behavioral: Negative for agitation and confusion.       Objective   Objective     Vitals:   Temp:  [97.4 °F (36.3 °C)-98 °F (36.7 °C)] 97.4 °F (36.3 °C)  Heart Rate:  [65-68] 65  Resp:  [16-18] 16  BP: (105-138)/(53-81) 121/78    Physical Exam    Constitutional: Awake, alert   Eyes: PERRLA, sclerae anicteric, no conjunctival injection   HENT: NCAT, mucous membranes moist   Neck: Supple, no thyromegaly, no lymphadenopathy, trachea midline   Respiratory: Clear to auscultation bilaterally, nonlabored respirations    Cardiovascular: RRR, no murmurs, rubs, or gallops, palpable pedal pulses bilaterally   Gastrointestinal: Positive bowel sounds, soft, nontender, nondistended   Musculoskeletal: No bilateral ankle edema, no clubbing or cyanosis to extremities   Psychiatric: Appropriate affect, cooperative   Neurologic: Oriented x 3, strength symmetric in all extremities, Cranial Nerves grossly intact to confrontation, speech clear   Skin: No rashes       Result Review       Result Review:  I have personally reviewed the results from the time of this admission to 11/03/21 7:34 PM EDT and agree with these findings:  [x]  Laboratory  [x]  Microbiology  [x]  Radiology  [x]  EKG/Telemetry   []  Cardiology/Vascular   []  Pathology  []  Old records  []  Other:        Assessment/Plan   Assessment / Plan     Active Hospital Problems:  Active Hospital Problems    Diagnosis    • Acute congestive heart failure (HCC)    Acute on chronic combined heart failure  Diabetes mellitus uncontrolled  Chronic kidney disease stage IIIb  Hypertension  Depression    Plan:  Agree with holding Lasix  we will follow the renal function  PT OT for ambulation    DVT prophylaxis:  Medical DVT prophylaxis orders are present.      Time spent including face to face service excluding procedures: 20 minutes          Electronically signed by Beck Carbajal MD, 11/04/21, 7:34 PM EDT.

## 2021-11-05 LAB
ANION GAP SERPL CALCULATED.3IONS-SCNC: 13.6 MMOL/L (ref 5–15)
BUN SERPL-MCNC: 80 MG/DL (ref 8–23)
BUN/CREAT SERPL: 29.2 (ref 7–25)
CALCIUM SPEC-SCNC: 8.9 MG/DL (ref 8.6–10.5)
CHLORIDE SERPL-SCNC: 98 MMOL/L (ref 98–107)
CO2 SERPL-SCNC: 21.4 MMOL/L (ref 22–29)
CREAT SERPL-MCNC: 2.74 MG/DL (ref 0.76–1.27)
GFR SERPL CREATININE-BSD FRML MDRD: 22 ML/MIN/1.73
GLUCOSE BLDC GLUCOMTR-MCNC: 143 MG/DL (ref 70–99)
GLUCOSE BLDC GLUCOMTR-MCNC: 148 MG/DL (ref 70–99)
GLUCOSE BLDC GLUCOMTR-MCNC: 213 MG/DL (ref 70–99)
GLUCOSE BLDC GLUCOMTR-MCNC: 244 MG/DL (ref 70–99)
GLUCOSE SERPL-MCNC: 138 MG/DL (ref 65–99)
POTASSIUM SERPL-SCNC: 4.2 MMOL/L (ref 3.5–5.2)
SODIUM SERPL-SCNC: 133 MMOL/L (ref 136–145)
WHOLE BLOOD HOLD SPECIMEN: NORMAL

## 2021-11-05 PROCEDURE — 63710000001 INSULIN LISPRO (HUMAN) PER 5 UNITS: Performed by: INTERNAL MEDICINE

## 2021-11-05 PROCEDURE — 25010000002 CEFTRIAXONE PER 250 MG: Performed by: INTERNAL MEDICINE

## 2021-11-05 PROCEDURE — 80048 BASIC METABOLIC PNL TOTAL CA: CPT | Performed by: INTERNAL MEDICINE

## 2021-11-05 PROCEDURE — 94799 UNLISTED PULMONARY SVC/PX: CPT

## 2021-11-05 PROCEDURE — 25010000002 HEPARIN (PORCINE) PER 1000 UNITS: Performed by: INTERNAL MEDICINE

## 2021-11-05 PROCEDURE — 82962 GLUCOSE BLOOD TEST: CPT

## 2021-11-05 PROCEDURE — 99233 SBSQ HOSP IP/OBS HIGH 50: CPT | Performed by: INTERNAL MEDICINE

## 2021-11-05 PROCEDURE — 63710000001 INSULIN DETEMIR PER 5 UNITS: Performed by: INTERNAL MEDICINE

## 2021-11-05 PROCEDURE — 97530 THERAPEUTIC ACTIVITIES: CPT

## 2021-11-05 PROCEDURE — 97110 THERAPEUTIC EXERCISES: CPT

## 2021-11-05 RX ORDER — METOPROLOL TARTRATE 50 MG/1
50 TABLET, FILM COATED ORAL 2 TIMES DAILY
Status: DISCONTINUED | OUTPATIENT
Start: 2021-11-05 | End: 2021-11-05

## 2021-11-05 RX ADMIN — INSULIN DETEMIR 30 UNITS: 100 INJECTION, SOLUTION SUBCUTANEOUS at 21:37

## 2021-11-05 RX ADMIN — DOCUSATE SODIUM 100 MG: 100 CAPSULE, LIQUID FILLED ORAL at 21:37

## 2021-11-05 RX ADMIN — MINERAL OIL, PETROLATUM: 425; 568 OINTMENT OPHTHALMIC at 08:49

## 2021-11-05 RX ADMIN — HEPARIN SODIUM 5000 UNITS: 5000 INJECTION, SOLUTION INTRAVENOUS; SUBCUTANEOUS at 21:37

## 2021-11-05 RX ADMIN — DOCUSATE SODIUM 100 MG: 100 CAPSULE, LIQUID FILLED ORAL at 08:49

## 2021-11-05 RX ADMIN — COLCHICINE 0.6 MG: 0.6 TABLET, FILM COATED ORAL at 21:37

## 2021-11-05 RX ADMIN — INSULIN LISPRO 4 UNITS: 100 INJECTION, SOLUTION INTRAVENOUS; SUBCUTANEOUS at 11:49

## 2021-11-05 RX ADMIN — HEPARIN SODIUM 5000 UNITS: 5000 INJECTION, SOLUTION INTRAVENOUS; SUBCUTANEOUS at 14:03

## 2021-11-05 RX ADMIN — GABAPENTIN 100 MG: 100 CAPSULE ORAL at 21:37

## 2021-11-05 RX ADMIN — ESCITALOPRAM OXALATE 5 MG: 10 TABLET ORAL at 08:49

## 2021-11-05 RX ADMIN — CEFTRIAXONE SODIUM 1 G: 1 INJECTION, SOLUTION INTRAVENOUS at 15:30

## 2021-11-05 RX ADMIN — INSULIN LISPRO 4 UNITS: 100 INJECTION, SOLUTION INTRAVENOUS; SUBCUTANEOUS at 17:19

## 2021-11-05 RX ADMIN — GABAPENTIN 100 MG: 100 CAPSULE ORAL at 08:49

## 2021-11-05 RX ADMIN — FAMOTIDINE 20 MG: 20 TABLET, FILM COATED ORAL at 21:37

## 2021-11-05 RX ADMIN — COLCHICINE 0.6 MG: 0.6 TABLET, FILM COATED ORAL at 08:48

## 2021-11-05 RX ADMIN — GABAPENTIN 100 MG: 100 CAPSULE ORAL at 15:26

## 2021-11-05 RX ADMIN — INSULIN LISPRO 7 UNITS: 100 INJECTION, SOLUTION INTRAVENOUS; SUBCUTANEOUS at 17:20

## 2021-11-05 RX ADMIN — SODIUM CHLORIDE, PRESERVATIVE FREE 10 ML: 5 INJECTION INTRAVENOUS at 21:38

## 2021-11-05 RX ADMIN — SODIUM CHLORIDE, PRESERVATIVE FREE 10 ML: 5 INJECTION INTRAVENOUS at 08:50

## 2021-11-05 RX ADMIN — ASPIRIN 81 MG: 81 TABLET, COATED ORAL at 08:48

## 2021-11-05 RX ADMIN — MINERAL OIL, PETROLATUM: 425; 568 OINTMENT OPHTHALMIC at 21:36

## 2021-11-05 RX ADMIN — HEPARIN SODIUM 5000 UNITS: 5000 INJECTION, SOLUTION INTRAVENOUS; SUBCUTANEOUS at 05:50

## 2021-11-05 RX ADMIN — INSULIN LISPRO 7 UNITS: 100 INJECTION, SOLUTION INTRAVENOUS; SUBCUTANEOUS at 11:49

## 2021-11-05 NOTE — THERAPY TREATMENT NOTE
Acute Care - Physical Therapy Treatment Note  SHARON Due[facility]as     Patient Name: Betty Givens  : 1935  MRN: [id number]  Today's Date: 2021      Visit Dx:     ICD-10-CM ICD-9-CM   1. Acute congestive heart failure, unspecified heart failure type (HCC)  I50.9 428.0   2. B12 deficiency  E53.8 266.2   3. Decreased activities of daily living (ADL)  Z78.9 V49.89   4. Difficulty in walking  R26.2 719.7     Patient Active Problem List   Diagnosis   • Essential hypertension   • Mixed hyperlipidemia   • Non-pressure chronic ulcer of other part of left foot with unspecified severity (HCC)   • Ulcer of foot due to type 2 diabetes mellitus (HCC)   • Type 2 diabetes mellitus without complication, with long-term current use of insulin (HCC)   • Stage 3b chronic kidney disease ([facility])   • Atherosclerosis of native coronary artery of native heart without angina pectoris   • Chronic combined systolic (congestive) and diastolic (congestive) heart failure (HCC)   • Primary osteoarthritis involving multiple joints   • Iron deficiency anemia secondary to inadequate dietary iron intake   • Other fatigue   • Acute idiopathic gout of right hand   • Acute congestive heart failure (HCC)     Past Medical History:   Diagnosis Date   • Anasarca    • Anemia, unspecified    • Atherosclerotic heart disease of native coronary artery without angina pectoris    • Chronic diastolic (congestive) heart failure (HCC)    • Chronic kidney disease, stage 3 ([facility])     moderate   • DM (diabetes mellitus) ([facility])    • Essential (primary) hypertension    • Family history of colonic polyps    • Hypercholesterolemia    • Mixed hyperlipidemia    • Non-pressure chronic ulcer of other part of left foot with unspecified severity (HCC)    • Pure hypercholesterolemia    • Tachycardia    • Type 2 diabetes mellitus with diabetic chronic kidney disease (HCC)     without complications; with foot ulcer   • Unspecified osteoarthritis, unspecified site      Past  Surgical History:   Procedure Laterality Date   • CATARACT EXTRACTION Left    • CHOLECYSTECTOMY     • EYE SURGERY Right    • SKIN CANCER DESTRUCTION  06/2019;6/2018    removal from hand; forehead      PT Assessment (last 12 hours)     PT Evaluation and Treatment     Row Name 11/05/21 1029          Physical Therapy Time and Intention    Subjective Information no complaints  -     Document Type therapy note (daily note)  -     Mode of Treatment individual therapy; physical therapy  -WM     Total Minutes, Physical Therapy 24  -     Patient Effort adequate  -     Symptoms Noted During/After Treatment fatigue  -     Row Name 11/05/21 1029          Cognition    Affect/Mental Status (Cognitive) unable/difficult to assess  -     Row Name 11/05/21 1029          Bed Mobility    Supine-Sit-Supine Chaves (Bed Mobility) moderate assist (50% patient effort)  -     Bed Mobility, Safety Issues decreased use of arms for pushing/pulling; decreased use of legs for bridging/pushing  -     Assistive Device (Bed Mobility) bed rails; draw sheet  -     Comment (Bed Mobility) Sat EOB 5 minutes with SBA  -     Row Name 11/05/21 1029          Safety Issues, Functional Mobility    Impairments Affecting Function (Mobility) balance; endurance/activity tolerance; strength  -     Row Name 11/05/21 1029          Balance    Balance Assessment sitting static balance  -     Static Sitting Balance WFL  -     Row Name 11/05/21 1029          Hip (Therapeutic Exercise)    Hip Strengthening (Therapeutic Exercise) bilateral; aBduction; aDduction; heel slides; supine; 10 repetitions; 2 sets  -     Row Name 11/05/21 1029          Knee (Therapeutic Exercise)    Knee Strengthening (Therapeutic Exercise) bilateral; SAQ (short arc quad); supine; 10 repetitions; 2 sets  -     Row Name 11/05/21 1029          Ankle (Therapeutic Exercise)    Ankle AAROM (Therapeutic Exercise) bilateral; dorsiflexion; plantarflexion; supine; 10  repetitions; 2 sets  -WM     Row Name             Wound 10/29/21 1413 Bilateral medial gluteal Pressure Injury    Wound - Properties Group Placement Date: 10/29/21  -KT Placement Time: 1413  -KT Present on Hospital Admission: --  -KT, Per sister, it was present before admission  Side: Bilateral  -KT Orientation: medial  -KT Location: gluteal  -KT Primary Wound Type: Pressure inj  -KT     Retired Wound - Properties Group Date first assessed: 10/29/21  -KT Time first assessed: 1413  -KT Present on Hospital Admission: --  -KT, Per sister, it was present before admission  Side: Bilateral  -KT Location: gluteal  -KT Primary Wound Type: Pressure inj  -KT     Row Name             Wound 10/29/21 1417 Left upper back Other (comment)    Wound - Properties Group Placement Date: 10/29/21  -KT Placement Time: 1417  -KT Present on Hospital Admission: Y  -KT Side: Left  -KT Orientation: upper  -KT Location: back  -KT Primary Wound Type: Other  -KT, surgery incision      Retired Wound - Properties Group Date first assessed: 10/29/21  -KT Time first assessed: 1417  -KT Present on Hospital Admission: Y  -KT Side: Left  -KT Location: back  -KT Primary Wound Type: Other  -KT, surgery incision      Row Name 11/05/21 1029          Therapy Assessment/Plan (PT)    Criteria for Skilled Interventions Met (PT) yes; meets criteria  -WM     Activity Limitations Related to Problem List (PT) unable to ambulate safely; unable to transfer safely  -WM     Row Name 11/05/21 1029          Progress Summary (PT)    Progress Toward Functional Goals (PT) progress toward functional goals is fair  -WM           User Key  (r) = Recorded By, (t) = Taken By, (c) = Cosigned By    Initials Name Provider Type    WM Anibal Early PTA Physical Therapy Assistant    Hamida David RNA Registered Nurse              Physical Therapy Education                 Title: PT OT SLP Therapies (Done)     Topic: Physical Therapy (Done)     Point: Mobility training  (Done)     Learning Progress Summary           Patient Acceptance, E,TB, VU,NR by DG at 11/5/2021 0154    Acceptance, E,TB, VU by AC at 11/2/2021 1516                   Point: Home exercise program (Done)     Learning Progress Summary           Patient Acceptance, E,TB, VU,NR by DG at 11/5/2021 0154    Acceptance, E,TB, VU by AC at 11/2/2021 1516                   Point: Body mechanics (Done)     Learning Progress Summary           Patient Acceptance, E,TB, VU,NR by DG at 11/5/2021 0154    Acceptance, E,TB, VU by AC at 11/2/2021 1516                   Point: Precautions (Done)     Learning Progress Summary           Patient Acceptance, E,TB, VU,NR by DG at 11/5/2021 0154    Acceptance, E,TB, VU by AC at 11/2/2021 1516                               User Key     Initials Effective Dates Name Provider Type Discipline    AC 06/03/21 -  Nigel Mccloud PT Physical Therapist PT     07/15/21 -  Loli Posada, RN Registered Nurse Nurse              PT Recommendation and Plan  Therapy Frequency (PT): daily  Progress Summary (PT)  Progress Toward Functional Goals (PT): progress toward functional goals is fair   Outcome Measures     Row Name 11/05/21 1032 11/04/21 1002 11/02/21 1516       How much help from another person do you currently need...    Turning from your back to your side while in flat bed without using bedrails? 2  -WM 3  -WM 3  -AC    Moving from lying on back to sitting on the side of a flat bed without bedrails? 2  -WM 3  -WM 3  -AC    Moving to and from a bed to a chair (including a wheelchair)? 2  -WM 2  -WM 2  -AC    Standing up from a chair using your arms (e.g., wheelchair, bedside chair)? 2  -WM 2  -WM 2  -AC    Climbing 3-5 steps with a railing? 2  -WM 1  -WM 1  -AC    To walk in hospital room? 2  -WM 2  -WM 2  -AC    AM-PAC 6 Clicks Score (PT) 12  -WM 13  -WM 13  -AC       Functional Assessment    Outcome Measure Options -- -- AM-PAC 6 Clicks Basic Mobility (PT)  -AC          User Key  (r) =  Recorded By, (t) = Taken By, (c) = Cosigned By    Initials Name Provider Type    Anibal Bender PTA Physical Therapy Assistant    Nigel Schmid, PT Physical Therapist                 Time Calculation:    PT Charges     Row Name 11/05/21 1029             Time Calculation    PT Received On 11/05/21  -WM              Timed Charges    58347 - PT Therapeutic Exercise Minutes 14  -WM      54033 - PT Therapeutic Activity Minutes 10  -WM              Total Minutes    Timed Charges Total Minutes 24  -WM       Total Minutes 24  -WM            User Key  (r) = Recorded By, (t) = Taken By, (c) = Cosigned By    Initials Name Provider Type    Anibal Bender PTA Physical Therapy Assistant              Therapy Charges for Today     Code Description Service Date Service Provider Modifiers Qty    82591205418 HC PT THER PROC EA 15 MIN 11/4/2021 Anibal Early PTA GP 1    51327119928 HC PT THER PROC EA 15 MIN 11/5/2021 Anibal Early PTA GP 1    75943898939 HC PT THERAPEUTIC ACT EA 15 MIN 11/5/2021 Anibal Early PTA GP 1          PT G-Codes  Outcome Measure Options: AM-PAC 6 Clicks Basic Mobility (PT)  AM-PAC 6 Clicks Score (PT): 12  AM-PAC 6 Clicks Score (OT): 11    Anibal Early PTA  11/5/2021

## 2021-11-05 NOTE — PROGRESS NOTES
[facility]   Progress Note    Patient Name: Betty Givens  : 1935  MRN: [id number]  Primary Care Physician: Mauro Cuellar MD  Date of admission: 10/28/2021    Subjective   Subjective   HPI: Altered mental status, congestive heart failure exacerbation  Patient arouses he is confused he is very hard of hearing, he is in no distress, nurses called about heart rates dropping into the 30s and 40s and that he gets medications that could affect this so we held on this morning,, blood pressure was also low diastolics, hydralazine was held this morning , discussed with nursing staff    Tele--- sinus rhythm,--- but developing bradycardia all through the night according to nursing staff, beta-blockers were held this morning    Review of Systems   All systems were reviewed and negative except for: Confusion, underlying cognitive issues, shortness of breath,      Objective   Objective     Vitals:  Patient Vitals for the past 24 hrs:   BP Temp Temp src Pulse Resp SpO2 Weight   21 0812 -- -- -- 59 -- 94 % --   21 0700 131/62 97.3 °F (36.3 °C) Oral 65 20 92 % --   21 0600 -- -- -- 64 -- -- --   21 0401 124/59 97.4 °F (36.3 °C) Oral 66 20 92 % --   21 0400 -- -- -- 61 -- -- --   21 0200 -- -- -- (!) 47 -- -- --   21 0100 -- -- -- -- -- -- 119 kg (262 lb 5.6 oz)   21 0000 -- -- -- 57 -- -- --   21 2300 149/44 97.6 °F (36.4 °C) Oral 64 18 93 % --   210 -- -- -- (!) 44 -- -- --   21 -- -- -- 66 18 94 % --   21 1923 122/68 98.3 °F (36.8 °C) Oral 64 18 93 % --   21 1500 121/78 97.4 °F (36.3 °C) Oral -- 16 94 % --   21 1100 128/81 97.8 °F (36.6 °C) Oral -- 18 93 % --      Physical Exam   Patient arouses, he is got extra pain of the left lower eyelid, his sclera nonicteric his abdomen is obese soft got a large but nonetheless, his lower extremities show 1-2+ edema, he has a Shultz catheter in place, he has no PreTAB  edema bilaterally, his cardiac exam reveals a regular rhythm his lungs are clear anteriorly and laterally, diminished breath sounds are noted laterally, he follows commands but is confused,      Result Review    Result Review:  I have personally reviewed the results from the time of this admission to 11/05/21 9:00 AM EDT and agree with these findings:  [x]  Laboratory  [x]  Microbiology  [x]  Radiology  [x]  EKG/Telemetry   []  Cardiology/Vascular   []  Pathology  []  Old records  []  Other:      Active Hospital Meds:  Scheduled Meds:artificial tears, , Left Eye, BID  aspirin, 81 mg, Oral, Daily  cefTRIAXone, 1 g, Intravenous, Q24H  colchicine, 0.6 mg, Oral, Q12H  cyanocobalamin, 1,000 mcg, Intramuscular, Q14 Days  docusate sodium, 100 mg, Oral, BID  escitalopram, 5 mg, Oral, Daily  famotidine, 20 mg, Oral, Nightly  gabapentin, 100 mg, Oral, TID  heparin (porcine), 5,000 Units, Subcutaneous, Q8H  hydrALAZINE, 50 mg, Oral, TID  insulin detemir, 30 Units, Subcutaneous, Nightly  insulin lispro, 0-9 Units, Subcutaneous, TID AC  insulin lispro, 7 Units, Subcutaneous, TID With Meals  isosorbide mononitrate, 30 mg, Oral, QAM  lisinopril, 10 mg, Oral, Q24H  metoprolol tartrate, 100 mg, Oral, BID  polyethylene glycol, 17 g, Oral, Daily  sodium chloride, 10 mL, Intravenous, Q12H      Continuous Infusions:   PRN Meds:.•  acetaminophen **OR** acetaminophen **OR** acetaminophen  •  dextrose  •  dextrose  •  glucagon (human recombinant)  •  nitroglycerin  •  ondansetron  •  sodium chloride  •  sodium chloride    Assessment/Plan   Assessment / Plan     Active Hospital Problems:  Active Hospital Problems    Diagnosis    • Acute congestive heart failure (HCC)        Assessment/Plan:   Bradycardia developing, will adjust medications hold beta-blockers,    Acute on chronic combined congestive heart failure, clinically stable, diuretics continue with adjustments based on clinical status creatinine numbers etc. BR NP level, still  elevated around 10,000, continues hydralazine,, high-dose metoprolol, may need to be adjusted based on the bradycardia developing today as above,----continue isosorbide 30 mg daily,, patient is also on Zestril 10 mg daily-, given renal function--- may need to stop this drug given renal issues,, can adjust hydralazine if necessary, nitros if necessary, continue indwelling Shultz catheter,    Acute kidney injury on top of chronic kidney disease stage IIIb-IV,, creatinine at 2.7 slightly elevated,, stable November 4-and 5 ,    Anemia mild we'll follow, iron deficiency, received IV iron this admission    Diabetes blood sugars overall stable but fluctuating November 5,, continue sliding scale, plus Levemir, 30 units nightly and scheduled short acting with meals    Coronary artery disease possible type II MI, no chest pains clinically    Hypertension, stable proved November 5    Morbid obesity, gait dysfunction most likely needs nursing home placement    Gout, arthritis, continues on colchicine and possible allopurinol in the near future may need Uloric based on renal function issues,    Depression,, starting SSRI,, Lexapro 5 mg daily    DVT prophylaxis, GI prophylaxis continue,    Vitamin B12 deficiency continues intramuscular dosing every 2 weeks    Peripheral neuropathy, pain, continues on gabapentin low doses 100 mg 3 times a day        CODE STATUS:    Code Status and Medical Interventions:   Ordered at: 10/28/21 2123     Level Of Support Discussed With:    Patient     Code Status (Patient has no pulse and is not breathing):    CPR (Attempt to Resuscitate)     Medical Interventions (Patient has pulse or is breathing):    Full       Electronically signed by Timothy Iniguez MD, 11/05/21, 9:00 AM EDT.

## 2021-11-05 NOTE — PROGRESS NOTES
[facility]     Progress Note    Patient Name: Betty Givens  : 1935  MRN: [id number]  Primary Care Physician:  Mauro Cuellar MD  Date of admission: 10/28/2021    Subjective   Subjective       Breathing better  patient is slightly drowsy and lethargic but answers questions  Heart rates dropping to 35 to 40 bpm  No chest pains    Review of Systems  Review of Systems   Constitutional: Negative for appetite change, fatigue and fever.   HENT: Negative for congestion.    Respiratory: Negative for apnea, choking, chest tightness, shortness of breath and wheezing.    Cardiovascular: Negative for chest pain, palpitations and leg swelling.   Gastrointestinal: Negative for diarrhea, nausea and vomiting.   Genitourinary: Negative for dysuria, frequency and hematuria.   Musculoskeletal: Positive for arthralgias. Negative for myalgias.   Skin: Negative for pallor.   Neurological: Negative for dizziness, tremors, syncope and weakness.   Psychiatric/Behavioral: Negative for agitation and confusion.       Objective   Objective     Vitals:   Temp:  [97.3 °F (36.3 °C)-98.3 °F (36.8 °C)] 97.5 °F (36.4 °C)  Heart Rate:  [44-69] 69  Resp:  [18-20] 20  BP: (120-149)/(44-68) 126/60    Physical Exam    Constitutional: Awake, alert   Eyes: PERRLA, sclerae anicteric, no conjunctival injection   HENT: NCAT, mucous membranes moist   Neck: Supple, no thyromegaly, no lymphadenopathy, trachea midline   Respiratory: Clear to auscultation bilaterally, nonlabored respirations    Cardiovascular: RRR, no murmurs, rubs, or gallops, palpable pedal pulses bilaterally   Gastrointestinal: Positive bowel sounds, soft, nontender, nondistended   Musculoskeletal: No bilateral ankle edema, no clubbing or cyanosis to extremities   Psychiatric: Appropriate affect, cooperative   Neurologic: Oriented x 3, strength symmetric in all extremities, Cranial Nerves grossly intact to confrontation, speech clear   Skin: No rashes       Result Review       Result Review:  I have personally reviewed the results from the time of this admission to 11/03/21 7:34 PM EDT and agree with these findings:  [x]  Laboratory  [x]  Microbiology  [x]  Radiology  [x]  EKG/Telemetry   []  Cardiology/Vascular   []  Pathology  []  Old records  []  Other:        Assessment/Plan   Assessment / Plan     Active Hospital Problems:  Active Hospital Problems    Diagnosis    • Acute congestive heart failure (HCC)    Acute on chronic combined heart failure  Diabetes mellitus uncontrolled  Chronic kidney disease stage IIIb  Hypertension  Depression    Plan  Hold the beta-blockers because of bradycardia  PT OT for ambulation    DVT prophylaxis:  Medical DVT prophylaxis orders are present.      Time spent including face to face service excluding procedures: 20 minutes          Electronically signed by Beck Carbajal MD, 11/05/21, 7:34 PM EDT.

## 2021-11-06 LAB
ALBUMIN SERPL-MCNC: 3.3 G/DL (ref 3.5–5.2)
ALBUMIN/GLOB SERPL: 0.9 G/DL
ALP SERPL-CCNC: 97 U/L (ref 39–117)
ALT SERPL W P-5'-P-CCNC: 38 U/L (ref 1–41)
ANION GAP SERPL CALCULATED.3IONS-SCNC: 12.2 MMOL/L (ref 5–15)
AST SERPL-CCNC: 108 U/L (ref 1–40)
BASOPHILS # BLD AUTO: 0.08 10*3/MM3 (ref 0–0.2)
BASOPHILS NFR BLD AUTO: 1.3 % (ref 0–1.5)
BILIRUB SERPL-MCNC: 0.2 MG/DL (ref 0–1.2)
BUN SERPL-MCNC: 71 MG/DL (ref 8–23)
BUN/CREAT SERPL: 33.5 (ref 7–25)
CALCIUM SPEC-SCNC: 9 MG/DL (ref 8.6–10.5)
CHLORIDE SERPL-SCNC: 103 MMOL/L (ref 98–107)
CO2 SERPL-SCNC: 23.8 MMOL/L (ref 22–29)
CREAT SERPL-MCNC: 2.12 MG/DL (ref 0.76–1.27)
DEPRECATED RDW RBC AUTO: 56.3 FL (ref 37–54)
EOSINOPHIL # BLD AUTO: 0.42 10*3/MM3 (ref 0–0.4)
EOSINOPHIL NFR BLD AUTO: 6.6 % (ref 0.3–6.2)
ERYTHROCYTE [DISTWIDTH] IN BLOOD BY AUTOMATED COUNT: 17.1 % (ref 12.3–15.4)
GFR SERPL CREATININE-BSD FRML MDRD: 30 ML/MIN/1.73
GLOBULIN UR ELPH-MCNC: 3.8 GM/DL
GLUCOSE BLDC GLUCOMTR-MCNC: 157 MG/DL (ref 70–99)
GLUCOSE BLDC GLUCOMTR-MCNC: 168 MG/DL (ref 70–99)
GLUCOSE BLDC GLUCOMTR-MCNC: 91 MG/DL (ref 70–99)
GLUCOSE BLDC GLUCOMTR-MCNC: 93 MG/DL (ref 70–99)
GLUCOSE SERPL-MCNC: 102 MG/DL (ref 65–99)
HCT VFR BLD AUTO: 39.9 % (ref 37.5–51)
HGB BLD-MCNC: 12.3 G/DL (ref 13–17.7)
IMM GRANULOCYTES # BLD AUTO: 0.08 10*3/MM3 (ref 0–0.05)
IMM GRANULOCYTES NFR BLD AUTO: 1.3 % (ref 0–0.5)
LYMPHOCYTES # BLD AUTO: 1.57 10*3/MM3 (ref 0.7–3.1)
LYMPHOCYTES NFR BLD AUTO: 24.8 % (ref 19.6–45.3)
MAGNESIUM SERPL-MCNC: 2.5 MG/DL (ref 1.6–2.4)
MCH RBC QN AUTO: 27.9 PG (ref 26.6–33)
MCHC RBC AUTO-ENTMCNC: 30.8 G/DL (ref 31.5–35.7)
MCV RBC AUTO: 90.5 FL (ref 79–97)
MONOCYTES # BLD AUTO: 0.51 10*3/MM3 (ref 0.1–0.9)
MONOCYTES NFR BLD AUTO: 8.1 % (ref 5–12)
NEUTROPHILS NFR BLD AUTO: 3.66 10*3/MM3 (ref 1.7–7)
NEUTROPHILS NFR BLD AUTO: 57.9 % (ref 42.7–76)
NRBC BLD AUTO-RTO: 0 /100 WBC (ref 0–0.2)
PLATELET # BLD AUTO: 425 10*3/MM3 (ref 140–450)
PMV BLD AUTO: 8.6 FL (ref 6–12)
POTASSIUM SERPL-SCNC: 4.3 MMOL/L (ref 3.5–5.2)
PROT SERPL-MCNC: 7.1 G/DL (ref 6–8.5)
RBC # BLD AUTO: 4.41 10*6/MM3 (ref 4.14–5.8)
SODIUM SERPL-SCNC: 139 MMOL/L (ref 136–145)
WBC # BLD AUTO: 6.32 10*3/MM3 (ref 3.4–10.8)

## 2021-11-06 PROCEDURE — 82962 GLUCOSE BLOOD TEST: CPT

## 2021-11-06 PROCEDURE — 85025 COMPLETE CBC W/AUTO DIFF WBC: CPT | Performed by: INTERNAL MEDICINE

## 2021-11-06 PROCEDURE — 99232 SBSQ HOSP IP/OBS MODERATE 35: CPT | Performed by: INTERNAL MEDICINE

## 2021-11-06 PROCEDURE — 80053 COMPREHEN METABOLIC PANEL: CPT | Performed by: INTERNAL MEDICINE

## 2021-11-06 PROCEDURE — 63710000001 INSULIN LISPRO (HUMAN) PER 5 UNITS: Performed by: INTERNAL MEDICINE

## 2021-11-06 PROCEDURE — 83735 ASSAY OF MAGNESIUM: CPT | Performed by: INTERNAL MEDICINE

## 2021-11-06 PROCEDURE — 94799 UNLISTED PULMONARY SVC/PX: CPT

## 2021-11-06 PROCEDURE — 94760 N-INVAS EAR/PLS OXIMETRY 1: CPT

## 2021-11-06 PROCEDURE — 97530 THERAPEUTIC ACTIVITIES: CPT

## 2021-11-06 PROCEDURE — 25010000002 HEPARIN (PORCINE) PER 1000 UNITS: Performed by: INTERNAL MEDICINE

## 2021-11-06 PROCEDURE — 63710000001 INSULIN DETEMIR PER 5 UNITS: Performed by: INTERNAL MEDICINE

## 2021-11-06 RX ORDER — METOPROLOL SUCCINATE 25 MG/1
25 TABLET, EXTENDED RELEASE ORAL
Status: DISCONTINUED | OUTPATIENT
Start: 2021-11-06 | End: 2021-11-09 | Stop reason: HOSPADM

## 2021-11-06 RX ADMIN — HYDRALAZINE HYDROCHLORIDE 50 MG: 50 TABLET, FILM COATED ORAL at 15:12

## 2021-11-06 RX ADMIN — HYDRALAZINE HYDROCHLORIDE 50 MG: 50 TABLET, FILM COATED ORAL at 21:54

## 2021-11-06 RX ADMIN — SODIUM CHLORIDE, PRESERVATIVE FREE 10 ML: 5 INJECTION INTRAVENOUS at 08:31

## 2021-11-06 RX ADMIN — FAMOTIDINE 20 MG: 20 TABLET, FILM COATED ORAL at 21:54

## 2021-11-06 RX ADMIN — INSULIN LISPRO 2 UNITS: 100 INJECTION, SOLUTION INTRAVENOUS; SUBCUTANEOUS at 12:26

## 2021-11-06 RX ADMIN — HEPARIN SODIUM 5000 UNITS: 5000 INJECTION, SOLUTION INTRAVENOUS; SUBCUTANEOUS at 23:22

## 2021-11-06 RX ADMIN — INSULIN LISPRO 7 UNITS: 100 INJECTION, SOLUTION INTRAVENOUS; SUBCUTANEOUS at 12:30

## 2021-11-06 RX ADMIN — ESCITALOPRAM OXALATE 5 MG: 10 TABLET ORAL at 08:30

## 2021-11-06 RX ADMIN — SODIUM CHLORIDE, PRESERVATIVE FREE 10 ML: 5 INJECTION INTRAVENOUS at 21:54

## 2021-11-06 RX ADMIN — HEPARIN SODIUM 5000 UNITS: 5000 INJECTION, SOLUTION INTRAVENOUS; SUBCUTANEOUS at 05:34

## 2021-11-06 RX ADMIN — MINERAL OIL, PETROLATUM: 425; 568 OINTMENT OPHTHALMIC at 21:54

## 2021-11-06 RX ADMIN — ACETAMINOPHEN 650 MG: 325 TABLET ORAL at 22:25

## 2021-11-06 RX ADMIN — GABAPENTIN 100 MG: 100 CAPSULE ORAL at 08:30

## 2021-11-06 RX ADMIN — DOCUSATE SODIUM 100 MG: 100 CAPSULE, LIQUID FILLED ORAL at 21:54

## 2021-11-06 RX ADMIN — HEPARIN SODIUM 5000 UNITS: 5000 INJECTION, SOLUTION INTRAVENOUS; SUBCUTANEOUS at 14:10

## 2021-11-06 RX ADMIN — INSULIN LISPRO 7 UNITS: 100 INJECTION, SOLUTION INTRAVENOUS; SUBCUTANEOUS at 17:00

## 2021-11-06 RX ADMIN — INSULIN LISPRO 7 UNITS: 100 INJECTION, SOLUTION INTRAVENOUS; SUBCUTANEOUS at 08:31

## 2021-11-06 RX ADMIN — GABAPENTIN 100 MG: 100 CAPSULE ORAL at 21:54

## 2021-11-06 RX ADMIN — COLCHICINE 0.6 MG: 0.6 TABLET, FILM COATED ORAL at 21:54

## 2021-11-06 RX ADMIN — COLCHICINE 0.6 MG: 0.6 TABLET, FILM COATED ORAL at 08:31

## 2021-11-06 RX ADMIN — MINERAL OIL, PETROLATUM: 425; 568 OINTMENT OPHTHALMIC at 08:31

## 2021-11-06 RX ADMIN — METOPROLOL SUCCINATE 25 MG: 25 TABLET, EXTENDED RELEASE ORAL at 09:29

## 2021-11-06 RX ADMIN — ASPIRIN 81 MG: 81 TABLET, COATED ORAL at 08:30

## 2021-11-06 RX ADMIN — INSULIN DETEMIR 10 UNITS: 100 INJECTION, SOLUTION SUBCUTANEOUS at 22:25

## 2021-11-06 RX ADMIN — INSULIN LISPRO 2 UNITS: 100 INJECTION, SOLUTION INTRAVENOUS; SUBCUTANEOUS at 16:54

## 2021-11-06 RX ADMIN — HYDRALAZINE HYDROCHLORIDE 50 MG: 50 TABLET, FILM COATED ORAL at 08:31

## 2021-11-06 RX ADMIN — GABAPENTIN 100 MG: 100 CAPSULE ORAL at 15:12

## 2021-11-06 NOTE — PLAN OF CARE
Goal Outcome Evaluation:           Progress: no change  Outcome Summary: pt has slept most of the shift, wakes when spoken to, pt had no complaints of pain, thomas draining adequately, vss, will continue to monitor.

## 2021-11-06 NOTE — THERAPY TREATMENT NOTE
Acute Care - Physical Therapy Progress Note  SHARON Dueñas     Patient Name: Betty Givens  : 1935  MRN: [id number]  Today's Date: 2021      Visit Dx:     ICD-10-CM ICD-9-CM   1. Acute congestive heart failure, unspecified heart failure type (HCC)  I50.9 428.0   2. B12 deficiency  E53.8 266.2   3. Decreased activities of daily living (ADL)  Z78.9 V49.89   4. Difficulty in walking  R26.2 719.7     Patient Active Problem List   Diagnosis   • Essential hypertension   • Mixed hyperlipidemia   • Non-pressure chronic ulcer of other part of left foot with unspecified severity (HCC)   • Ulcer of foot due to type 2 diabetes mellitus (HCC)   • Type 2 diabetes mellitus without complication, with long-term current use of insulin (HCC)   • Stage 3b chronic kidney disease (Formerly [facility])   • Atherosclerosis of native coronary artery of native heart without angina pectoris   • Chronic combined systolic (congestive) and diastolic (congestive) heart failure (Formerly [facility])   • Primary osteoarthritis involving multiple joints   • Iron deficiency anemia secondary to inadequate dietary iron intake   • Other fatigue   • Acute idiopathic gout of right hand   • Acute congestive heart failure (HCC)     Past Medical History:   Diagnosis Date   • Anasarca    • Anemia, unspecified    • Atherosclerotic heart disease of native coronary artery without angina pectoris    • Chronic diastolic (congestive) heart failure (HCC)    • Chronic kidney disease, stage 3 (Formerly [facility])     moderate   • DM (diabetes mellitus) (Formerly [facility])    • Essential (primary) hypertension    • Family history of colonic polyps    • Hypercholesterolemia    • Mixed hyperlipidemia    • Non-pressure chronic ulcer of other part of left foot with unspecified severity (HCC)    • Pure hypercholesterolemia    • Tachycardia    • Type 2 diabetes mellitus with diabetic chronic kidney disease (HCC)     without complications; with foot ulcer   • Unspecified osteoarthritis, unspecified site      Past  Surgical History:   Procedure Laterality Date   • CATARACT EXTRACTION Left    • CHOLECYSTECTOMY     • EYE SURGERY Right    • SKIN CANCER DESTRUCTION  06/2019;6/2018    removal from hand; forehead      PT Assessment (last 12 hours)     PT Evaluation and Treatment     Row Name 11/06/21 1200          Physical Therapy Time and Intention    Subjective Information no complaints  -CS     Document Type therapy note (daily note)  -CS     Mode of Treatment individual therapy; physical therapy  -CS     Patient Effort adequate  -CS     Symptoms Noted During/After Treatment none  -     Row Name 11/06/21 1200          Bed Mobility    Supine-Sit [place] (Bed Mobility) verbal cues; nonverbal cues (demo/gesture); moderate assist (50% patient effort); 1 person assist  -     Bed Mobility, Safety Issues decreased use of arms for pushing/pulling; decreased use of legs for bridging/pushing; impaired trunk control for bed mobility  -     Assistive Device (Bed Mobility) bed rails; draw sheet; head of bed elevated  -     Row Name 11/06/21 1200          Transfers    Transfers stand-sit transfer; sit-stand transfer; bed-chair transfer  -     Bed-Chair [place] (Transfers) minimum assist (75% patient effort); 1 person assist; verbal cues; nonverbal cues (demo/gesture)  -     Sit-Stand [place] (Transfers) verbal cues; nonverbal cues (demo/gesture); minimum assist (75% patient effort); 1 person assist  -     Stand-Sit [place] (Transfers) contact guard; verbal cues; 1 person assist  -     Row Name 11/06/21 1200          Sit-Stand Transfer    Assistive Device (Sit-Stand Transfers) walker, front-wheeled  -     Row Name 11/06/21 1200          Stand-Sit Transfer    Assistive Device (Stand-Sit Transfers) walker, front-wheeled  -     Row Name 11/06/21 1200          Gait/Stairs (Locomotion)    Gait/Stairs Locomotion gait/ambulation independence; gait/ambulation assistive device; distance ambulated; gait pattern   -CS     [place] Level (Gait) contact guard; 1 person assist; verbal cues  -CS     Assistive Device (Gait) walker, front-wheeled  -CS     Distance in Feet (Gait) 3  -CS     Pattern (Gait) 3-point; step-to  -CS     Deviations/Abnormal Patterns (Gait) base of support, narrow; john decreased; festinating/shuffling  -CS     Row Name             Wound 10/29/21 1413 Bilateral medial gluteal Pressure Injury    Wound - Properties Group Placement Date: 10/29/21  -KT Placement Time: 1413  -KT Present on Hospital Admission: --  -KT, Per sister, it was present before admission  Side: Bilateral  -KT Orientation: medial  -KT Location: gluteal  -KT Primary Wound Type: Pressure inj  -KT     Retired Wound - Properties Group Date first assessed: 10/29/21  -KT Time first assessed: 1413  -KT Present on Hospital Admission: --  -KT, Per sister, it was present before admission  Side: Bilateral  -KT Location: gluteal  -KT Primary Wound Type: Pressure inj  -KT     Row Name             Wound 10/29/21 1417 Left upper back Other (comment)    Wound - Properties Group Placement Date: 10/29/21  -KT Placement Time: 1417  -KT Present on Hospital Admission: Y  -KT Side: Left  -KT Orientation: upper  -KT Location: back  -KT Primary Wound Type: Other  -KT, surgery incision      Retired Wound - Properties Group Date first assessed: 10/29/21  -KT Time first assessed: 1417  -KT Present on Hospital Admission: Y  -KT Side: Left  -KT Location: back  -KT Primary Wound Type: Other  -KT, surgery incision      Row Name 11/06/21 1200          Progress Summary (PT)    Daily Progress Summary (PT) Pt. presents today in semi amato position in the bed without bed alarm on today.  Pt. agreeable to PT Tx and was assisted to EOB as per this note, requiring verbal and tactile cues for hand placement and sequencing.  Pt. required assistance for standing today with verbal cues provided for hand placement to increase safety.  Ambulated short distance to chair today  as per this note.  T/F'd to chair with verbal cues again for hand placement.  Pt. then provided call button within reach and table in front of him.  Pt. left in care of PCA that was present in room at this time.  -CS           User Key  (r) = Recorded By, (t) = Taken By, (c) = Cosigned By    Initials Name Provider Type    ANNIE Gonzalez Hamida, RNA Registered Nurse    Torey Silva PTA Physical Therapy Assistant              Physical Therapy Education                 Title: PT OT SLP Therapies (Done)     Topic: Physical Therapy (Done)     Point: Mobility training (Done)     Learning Progress Summary           Patient Acceptance, E,TB, VU,NR by ALINA at 11/5/2021 0154    Acceptance, E,TB, VU by AC at 11/2/2021 1516                   Point: Home exercise program (Done)     Learning Progress Summary           Patient Acceptance, E,TB, VU,NR by ALINA at 11/5/2021 0154    Acceptance, E,TB, VU by AC at 11/2/2021 1516                   Point: Body mechanics (Done)     Learning Progress Summary           Patient Acceptance, E,TB, VU,NR by DG at 11/5/2021 0154    Acceptance, E,TB, VU by AC at 11/2/2021 1516                   Point: Precautions (Done)     Learning Progress Summary           Patient Acceptance, E,TB, VU,NR by ALINA at 11/5/2021 0154    Acceptance, E,TB, VU by AC at 11/2/2021 1516                               User Key     Initials Effective Dates Name Provider Type Discipline    AC 06/03/21 -  Nigel Mccloud PT Physical Therapist PT     07/15/21 -  Loli Posada, RN Registered Nurse Nurse              PT Recommendation and Plan     Progress Summary (PT)  Daily Progress Summary (PT): Pt. presents today in semi amato position in the bed without bed alarm on today.  Pt. agreeable to PT Tx and was assisted to EOB as per this note, requiring verbal and tactile cues for hand placement and sequencing.  Pt. required assistance for standing today with verbal cues provided for hand placement to increase safety.   Ambulated short distance to chair today as per this note.  T/F'd to chair with verbal cues again for hand placement.  Pt. then provided call button within reach and table in front of him.  Pt. left in care of PCA that was present in room at this time.   Outcome Measures     Row Name 11/06/21 1200 11/05/21 1032 11/04/21 1002       How much help from another person do you currently need...    Turning from your back to your side while in flat bed without using bedrails? 2  -CS 2  -WM 3  -WM    Moving from lying on back to sitting on the side of a flat bed without bedrails? 2  -CS 2  -WM 3  -WM    Moving to and from a bed to a chair (including a wheelchair)? 3  -CS 2  -WM 2  -WM    Standing up from a chair using your arms (e.g., wheelchair, bedside chair)? 3  -CS 2  -WM 2  -WM    Climbing 3-5 steps with a railing? 2  -CS 2  -WM 1  -WM    To walk in hospital room? 3  -CS 2  -WM 2  -WM    AM-PAC 6 Clicks Score (PT) 15  -CS 12  -WM 13  -WM       Functional Assessment    Outcome Measure Options AM-PAC 6 Clicks Basic Mobility (PT)  -CS -- --          User Key  (r) = Recorded By, (t) = Taken By, (c) = Cosigned By    Initials Name Provider Type     Anibal Early PTA Physical Therapy Assistant    Torey Silva PTA Physical Therapy Assistant                 Time Calculation:    PT Charges     Row Name 11/06/21 1251             Time Calculation    Start Time 0943  -CS      PT Received On 11/06/21  -CS      PT Goal Re-Cert Due Date 11/11/21  -CS              Timed Charges    46682 - PT Therapeutic Activity Minutes 13  -CS              Total Minutes    Timed Charges Total Minutes 13  -CS       Total Minutes 13  -CS            User Key  (r) = Recorded By, (t) = Taken By, (c) = Cosigned By    Initials Name Provider Type    Torey Silva PTA Physical Therapy Assistant              Therapy Charges for Today     Code Description Service Date Service Provider Modifiers Qty    [id number]  PT THERAPEUTIC ACT EA 15 MIN  11/6/2021 Torey Hu, PTA GP 1          PT G-Codes  Outcome Measure Options: AM-PAC 6 Clicks Basic Mobility (PT)  AM-PAC 6 Clicks Score (PT): 15  AM-PAC 6 Clicks Score (OT): 11    Torey Hu PTA  11/6/2021

## 2021-11-06 NOTE — PROGRESS NOTES
[facility]   Progress Note    Patient Name: Betty Givens  : 1935  MRN: [id number]  Primary Care Physician: Mauro Cuellar MD  Date of admission: 10/28/2021    Subjective   Subjective   HPI: Congestive heart failure, bradycardia developing yesterday    Patient is much more awake and alert more talkative more coherent than he was yesterday, multiple discussions with nursing staff all day yesterday about his heart rates dropping, finally beta-blockers were stopped completely, heart rates have improved into the 80s this morning, they were still in the 30s and 40s late afternoon early evening yesterday , patient denies chest pains, says he is eating better, we told him he has to get up out of bed today,        Review of Systems   All systems were reviewed and negative except for: Overall weakness, denies chest pain or shortness of breath currently, confused at baseline      Objective   Objective     Vitals:  Patient Vitals for the past 24 hrs:   BP Temp Temp src Pulse Resp SpO2   21 0330 132/45 97.6 °F (36.4 °C) Oral 69 18 93 %   21 2310 137/44 97.7 °F (36.5 °C) Oral 73 18 94 %   21 2003 117/53 98 °F (36.7 °C) Oral 63 18 95 %   21 1535 126/60 97.5 °F (36.4 °C) Oral 69 20 98 %   21 1110 120/55 97.9 °F (36.6 °C) Oral 53 20 95 %      Physical Exam   Lungs show crackles at both bases especially at the bottoms posteriorly cardiac exam reveals a regular rhythm distant heart tones his abdomen is obese soft his lower legs showed trace to 1+ edema around the ankles trace edema to the PreTAB regions bilaterally, he is awake alert he is got extra pain of the left lower eyelid, he is more talkative more coherent than yesterday,      Result Review    Result Review:  I have personally reviewed the results from the time of this admission to 21 8:32 AM EDT and agree with these findings:  [x]  Laboratory  []  Microbiology  []  Radiology  []  EKG/Telemetry   []   Cardiology/Vascular   []  Pathology  []  Old records  []  Other:      Active Hospital Meds:  Scheduled Meds:artificial tears, , Left Eye, BID  aspirin, 81 mg, Oral, Daily  colchicine, 0.6 mg, Oral, Q12H  cyanocobalamin, 1,000 mcg, Intramuscular, Q14 Days  docusate sodium, 100 mg, Oral, BID  escitalopram, 5 mg, Oral, Daily  famotidine, 20 mg, Oral, Nightly  gabapentin, 100 mg, Oral, TID  heparin (porcine), 5,000 Units, Subcutaneous, Q8H  hydrALAZINE, 50 mg, Oral, TID  insulin detemir, 30 Units, Subcutaneous, Nightly  insulin lispro, 0-9 Units, Subcutaneous, TID AC  insulin lispro, 7 Units, Subcutaneous, TID With Meals  isosorbide mononitrate, 30 mg, Oral, QAM  polyethylene glycol, 17 g, Oral, Daily  sodium chloride, 10 mL, Intravenous, Q12H      Continuous Infusions:   PRN Meds:.•  acetaminophen **OR** acetaminophen **OR** acetaminophen  •  dextrose  •  dextrose  •  glucagon (human recombinant)  •  nitroglycerin  •  ondansetron  •  sodium chloride  •  sodium chloride    Assessment/Plan   Assessment / Plan     Active Hospital Problems:  Active Hospital Problems    Diagnosis    • Acute congestive heart failure (HCC)        Assessment/Plan:   Bradycardia developing, will adjust medications hold beta-blockers,, stopped them yesterday completely yesterday afternoon due to continued bradycardia, will restart low-dose is a 25 mg of Toprol once a day due to cardiac and coronary and congestive heart failure issues and monitor closely, cardiology is aware November 6, 2021     Acute on chronic combined congestive heart failure, clinically stable, diuretics continue with adjustments based on clinical status creatinine numbers etc. BR NP level, still elevated around 10,000, continues hydralazine,, high-dose metoprolol, which was stopped eventually yesterday but will be restarted November 6, at much lower doses---continue isosorbide 30 mg daily,, will stop Zestril 10 mg daily-, given renal function--November 5, due to renal  issues,, can adjust hydralazine if necessary, Imdur, if necessary, continue indwelling Shultz catheter,     Acute kidney injury on top of chronic kidney disease stage IIIb-IV,, creatinine at 2.7 slightly elevated,, stable November 4-and 5 ,     Anemia mild we'll follow, iron deficiency, received IV iron this admission     Diabetes blood sugars overall stable but fluctuating November 5,, continue sliding scale, plus Levemir, 30 units nightly and scheduled short acting with meals     Coronary artery disease possible type II MI, no chest pains clinically     Hypertension, stable, improved November 5, 6     Morbid obesity, gait dysfunction most likely needs nursing home placement, rehab, discussed with nursing staff to get up in the chair today November 6     Gout, arthritis, continues on colchicine twice a day and possible allopurinol in the near future may need Uloric based on renal function issues,     Depression,, continue Lexapro 5 mg daily     DVT prophylaxis, GI prophylaxis continue,     Vitamin B12 deficiency continues intramuscular dosing every 2 weeks     Peripheral neuropathy, pain, continues on gabapentin low doses 100 mg 3 times a day           CODE STATUS:    Code Status and Medical Interventions:   Ordered at: 10/28/21 2123     Level Of Support Discussed With:    Patient     Code Status (Patient has no pulse and is not breathing):    CPR (Attempt to Resuscitate)     Medical Interventions (Patient has pulse or is breathing):    Full       Electronically signed by Timothy Iniguez MD, 11/06/21, 8:32 AM EDT.

## 2021-11-06 NOTE — PROGRESS NOTES
[facility]     Progress Note    Patient Name: Betty Givens  : 1935  MRN: [id number]  Primary Care Physician:  Mauro Cuellar MD  Date of admission: 10/28/2021    Subjective   Subjective       Breathing better  patient is more alert and oriented.  His heart rate finally has come up to 70 to 80 bpm  No chest pains    Review of Systems  Review of Systems   Constitutional: Negative for appetite change, fatigue and fever.   HENT: Negative for congestion.    Respiratory: Negative for apnea, choking, chest tightness, shortness of breath and wheezing.    Cardiovascular: Negative for chest pain, palpitations and leg swelling.   Gastrointestinal: Negative for diarrhea, nausea and vomiting.   Genitourinary: Negative for dysuria, frequency and hematuria.   Musculoskeletal: Positive for arthralgias. Negative for myalgias.   Skin: Negative for pallor.   Neurological: Negative for dizziness, tremors, syncope and weakness.   Psychiatric/Behavioral: Negative for agitation and confusion.       Objective   Objective     Vitals:   Temp:  [97.5 °F (36.4 °C)-98 °F (36.7 °C)] 97.7 °F (36.5 °C)  Heart Rate:  [] 100  Resp:  [18-20] 18  BP: (117-157)/(44-64) 157/64    Physical Exam    Constitutional: Awake, alert   Eyes: PERRLA, sclerae anicteric, no conjunctival injection   HENT: NCAT, mucous membranes moist   Neck: Supple, no thyromegaly, no lymphadenopathy, trachea midline   Respiratory: Clear to auscultation bilaterally, nonlabored respirations    Cardiovascular: RRR, no murmurs, rubs, or gallops, palpable pedal pulses bilaterally   Gastrointestinal: Positive bowel sounds, soft, nontender, nondistended   Musculoskeletal: No bilateral ankle edema, no clubbing or cyanosis to extremities   Psychiatric: Appropriate affect, cooperative   Neurologic: Oriented x 3, strength symmetric in all extremities, Cranial Nerves grossly intact to confrontation, speech clear   Skin: No rashes       Result Review      Result  Review:  I have personally reviewed the results from the time of this admission to 11/03/21 7:34 PM EDT and agree with these findings:  [x]  Laboratory  [x]  Microbiology  [x]  Radiology  [x]  EKG/Telemetry   []  Cardiology/Vascular   []  Pathology  []  Old records  []  Other:        Assessment/Plan   Assessment / Plan     Active Hospital Problems:  Active Hospital Problems    Diagnosis    • Acute congestive heart failure (HCC)    Acute on chronic combined heart failure  Diabetes mellitus uncontrolled  Chronic kidney disease stage IIIb  Hypertension  Depression    Plan  Agree with starting on a small dose of beta-blocker  PT OT for ambulation    DVT prophylaxis:  Medical DVT prophylaxis orders are present.      Time spent including face to face service excluding procedures: 20 minutes          Electronically signed by Beck Carbajal MD, 11/06/21, 7:34 PM EDT.

## 2021-11-06 NOTE — PLAN OF CARE
Goal Outcome Evaluation:              Outcome Summary: vss. patient restarted on metoprolol today, HR has been 55-90. No complaints at this time.

## 2021-11-07 LAB
ANION GAP SERPL CALCULATED.3IONS-SCNC: 11.5 MMOL/L (ref 5–15)
BUN SERPL-MCNC: 61 MG/DL (ref 8–23)
BUN/CREAT SERPL: 33 (ref 7–25)
CALCIUM SPEC-SCNC: 9 MG/DL (ref 8.6–10.5)
CHLORIDE SERPL-SCNC: 103 MMOL/L (ref 98–107)
CO2 SERPL-SCNC: 24.5 MMOL/L (ref 22–29)
CREAT SERPL-MCNC: 1.85 MG/DL (ref 0.76–1.27)
GFR SERPL CREATININE-BSD FRML MDRD: 35 ML/MIN/1.73
GLUCOSE BLDC GLUCOMTR-MCNC: 134 MG/DL (ref 70–99)
GLUCOSE BLDC GLUCOMTR-MCNC: 155 MG/DL (ref 70–99)
GLUCOSE BLDC GLUCOMTR-MCNC: 164 MG/DL (ref 70–99)
GLUCOSE BLDC GLUCOMTR-MCNC: 212 MG/DL (ref 70–99)
GLUCOSE SERPL-MCNC: 119 MG/DL (ref 65–99)
POTASSIUM SERPL-SCNC: 4.3 MMOL/L (ref 3.5–5.2)
SODIUM SERPL-SCNC: 139 MMOL/L (ref 136–145)

## 2021-11-07 PROCEDURE — 99232 SBSQ HOSP IP/OBS MODERATE 35: CPT | Performed by: INTERNAL MEDICINE

## 2021-11-07 PROCEDURE — 25010000002 HEPARIN (PORCINE) PER 1000 UNITS: Performed by: INTERNAL MEDICINE

## 2021-11-07 PROCEDURE — 63710000001 INSULIN LISPRO (HUMAN) PER 5 UNITS: Performed by: INTERNAL MEDICINE

## 2021-11-07 PROCEDURE — 80048 BASIC METABOLIC PNL TOTAL CA: CPT | Performed by: INTERNAL MEDICINE

## 2021-11-07 PROCEDURE — 94799 UNLISTED PULMONARY SVC/PX: CPT

## 2021-11-07 PROCEDURE — 82962 GLUCOSE BLOOD TEST: CPT

## 2021-11-07 RX ADMIN — METOPROLOL SUCCINATE 25 MG: 25 TABLET, EXTENDED RELEASE ORAL at 08:23

## 2021-11-07 RX ADMIN — MINERAL OIL, PETROLATUM: 425; 568 OINTMENT OPHTHALMIC at 21:15

## 2021-11-07 RX ADMIN — HEPARIN SODIUM 5000 UNITS: 5000 INJECTION, SOLUTION INTRAVENOUS; SUBCUTANEOUS at 14:33

## 2021-11-07 RX ADMIN — GABAPENTIN 100 MG: 100 CAPSULE ORAL at 08:23

## 2021-11-07 RX ADMIN — HEPARIN SODIUM 5000 UNITS: 5000 INJECTION, SOLUTION INTRAVENOUS; SUBCUTANEOUS at 21:16

## 2021-11-07 RX ADMIN — INSULIN LISPRO 7 UNITS: 100 INJECTION, SOLUTION INTRAVENOUS; SUBCUTANEOUS at 17:03

## 2021-11-07 RX ADMIN — INSULIN LISPRO 2 UNITS: 100 INJECTION, SOLUTION INTRAVENOUS; SUBCUTANEOUS at 17:02

## 2021-11-07 RX ADMIN — INSULIN LISPRO 4 UNITS: 100 INJECTION, SOLUTION INTRAVENOUS; SUBCUTANEOUS at 11:39

## 2021-11-07 RX ADMIN — INSULIN LISPRO 7 UNITS: 100 INJECTION, SOLUTION INTRAVENOUS; SUBCUTANEOUS at 08:23

## 2021-11-07 RX ADMIN — HYDRALAZINE HYDROCHLORIDE 50 MG: 50 TABLET, FILM COATED ORAL at 08:23

## 2021-11-07 RX ADMIN — HEPARIN SODIUM 5000 UNITS: 5000 INJECTION, SOLUTION INTRAVENOUS; SUBCUTANEOUS at 06:18

## 2021-11-07 RX ADMIN — HYDRALAZINE HYDROCHLORIDE 50 MG: 50 TABLET, FILM COATED ORAL at 15:05

## 2021-11-07 RX ADMIN — ESCITALOPRAM OXALATE 5 MG: 10 TABLET ORAL at 08:23

## 2021-11-07 RX ADMIN — FAMOTIDINE 20 MG: 20 TABLET, FILM COATED ORAL at 21:15

## 2021-11-07 RX ADMIN — ISOSORBIDE MONONITRATE 30 MG: 30 TABLET ORAL at 06:19

## 2021-11-07 RX ADMIN — SODIUM CHLORIDE, PRESERVATIVE FREE 10 ML: 5 INJECTION INTRAVENOUS at 08:24

## 2021-11-07 RX ADMIN — MINERAL OIL, PETROLATUM: 425; 568 OINTMENT OPHTHALMIC at 08:23

## 2021-11-07 RX ADMIN — HYDRALAZINE HYDROCHLORIDE 50 MG: 50 TABLET, FILM COATED ORAL at 21:15

## 2021-11-07 RX ADMIN — GABAPENTIN 100 MG: 100 CAPSULE ORAL at 21:15

## 2021-11-07 RX ADMIN — INSULIN LISPRO 7 UNITS: 100 INJECTION, SOLUTION INTRAVENOUS; SUBCUTANEOUS at 11:38

## 2021-11-07 RX ADMIN — COLCHICINE 0.6 MG: 0.6 TABLET, FILM COATED ORAL at 08:23

## 2021-11-07 RX ADMIN — ASPIRIN 81 MG: 81 TABLET, COATED ORAL at 08:23

## 2021-11-07 RX ADMIN — SODIUM CHLORIDE, PRESERVATIVE FREE 10 ML: 5 INJECTION INTRAVENOUS at 21:20

## 2021-11-07 RX ADMIN — GABAPENTIN 100 MG: 100 CAPSULE ORAL at 15:05

## 2021-11-07 RX ADMIN — COLCHICINE 0.6 MG: 0.6 TABLET, FILM COATED ORAL at 21:15

## 2021-11-07 RX ADMIN — DOCUSATE SODIUM 100 MG: 100 CAPSULE, LIQUID FILLED ORAL at 21:15

## 2021-11-07 NOTE — PLAN OF CARE
Goal Outcome Evaluation:              Outcome Summary: vss. no complaints per patient at this time. Patient has been increasing activity, getting up to chair for meals, tolerating activity fair with walker.

## 2021-11-07 NOTE — PROGRESS NOTES
[facility]   Progress Note    Patient Name: Betty Givens  : 1935  MRN: [id number]  Primary Care Physician: Mauro Cuellar MD  Date of admission: 10/28/2021    Subjective   Subjective   HPI: Congestive heart failure follow-up, shortness of breath  Patient is pleasant says he is doing okay is hard of hearing he did not have his hearing aids in place today, he was sitting up partially in bed he was arousable he was easy to talk to but he was still somewhat confused with my questioning, he was not using accessory muscles today to breathe, he says he wants to go home    Tele sinus with pvc and pac,  No sign dorothea since adjusting b blockeers      Review of Systems   All systems were reviewed and negative except for: Still short of breath at times weak all over,      Objective   Objective     Vitals:  Patient Vitals for the past 24 hrs:   BP Temp Temp src Pulse Resp SpO2   21 0759 121/43 97.7 °F (36.5 °C) Oral 85 18 95 %   21 0351 130/51 97.5 °F (36.4 °C) Oral 76 16 97 %   21 -- -- -- 83 -- --   21 -- -- -- 78 -- --   21 -- -- -- 62 -- --   21 -- -- -- 77 -- --   21 -- -- -- 59 -- --   21 -- -- -- 72 -- --   21 -- -- -- 75 -- --   21 -- -- -- 80 -- --   21 -- -- -- 85 -- --   21 -- -- -- 52 -- --   21 -- -- -- 82 -- --   21 -- -- -- 67 -- --   21 -- -- -- 52 -- --   21 -- -- -- 69 -- --   21 -- -- -- 53 -- --   21 -- -- -- 62 -- --   21 -- -- -- 76 -- --   21 -- -- -- 51 -- --   21 -- -- -- 68 -- --   21 -- -- -- 75 -- --   21 -- -- -- 51 -- --   21 -- -- -- 82 -- --   21 -- -- -- 60 -- --   21 -- -- -- 81 -- --   21 -- -- -- 54 -- --   21 -- -- -- 70 -- --   21 -- -- -- 79 -- --   21 --  -- -- 81 -- --   11/06/21 2255 -- -- -- 90 -- --   11/06/21 2254 117/80 97.3 °F (36.3 °C) Oral 83 16 96 %   11/06/21 2253 -- -- -- 79 -- --   11/06/21 2252 -- -- -- 73 -- --   11/06/21 2251 -- -- -- 86 -- --   11/06/21 2250 -- -- -- 57 -- --   11/06/21 2249 -- -- -- 67 -- --   11/06/21 2248 -- -- -- 87 -- --   11/06/21 2247 -- -- -- 62 -- --   11/06/21 2246 -- -- -- 83 -- --   11/06/21 2245 -- -- -- 79 -- --   11/06/21 2244 -- -- -- 67 -- --   11/06/21 2243 -- -- -- 73 -- --   11/06/21 2242 -- -- -- 51 -- --   11/06/21 2241 -- -- -- 67 -- --   11/06/21 2240 -- -- -- 70 -- --   11/06/21 2239 -- -- -- 76 -- --   11/06/21 2238 -- -- -- 68 -- --   11/06/21 2237 -- -- -- 70 -- --   11/06/21 2236 -- -- -- 73 -- --   11/06/21 2235 -- -- -- 83 -- --   11/06/21 2234 -- -- -- 87 -- --   11/06/21 2233 -- -- -- 70 -- --   11/06/21 2232 -- -- -- 78 -- --   11/06/21 2231 -- -- -- 89 -- --   11/06/21 2230 -- -- -- 91 -- --   11/06/21 2229 -- -- -- 89 -- --   11/06/21 2228 -- -- -- 80 -- --   11/06/21 2227 -- -- -- 62 -- --   11/06/21 2226 -- -- -- 77 -- --   11/06/21 2225 -- -- -- 65 -- --   11/06/21 2224 -- -- -- 57 -- --   11/06/21 2223 -- -- -- (!) 49 -- --   11/06/21 2222 -- -- -- 63 -- --   11/06/21 2221 -- -- -- 75 -- --   11/06/21 2220 -- -- -- 63 -- --   11/06/21 2219 -- -- -- 71 -- --   11/06/21 2218 -- -- -- 55 -- --   11/06/21 2217 -- -- -- 57 -- --   11/06/21 2216 -- -- -- 78 -- --   11/06/21 2215 -- -- -- 77 -- --   11/06/21 2214 -- -- -- 67 -- --   11/06/21 2213 -- -- -- 62 -- --   11/06/21 2212 -- -- -- 59 -- --   11/06/21 2211 -- -- -- 73 -- --   11/06/21 2210 -- -- -- 79 -- --   11/06/21 2209 -- -- -- 77 -- --   11/06/21 2208 -- -- -- 60 -- --   11/06/21 2207 -- -- -- 79 -- --   11/06/21 2206 -- -- -- 79 -- --   11/06/21 2205 -- -- -- 82 -- --   11/06/21 2204 -- -- -- 64 -- --   11/06/21 2203 -- -- -- 82 -- --   11/06/21 2202 -- -- -- 79 -- --   11/06/21 2201 -- -- -- 67 -- --   11/06/21 2200 -- -- -- 58 -- --    11/06/21 2042 -- -- -- 57 -- --   11/06/21 2041 -- -- -- 70 -- --   11/06/21 2040 -- -- -- 73 -- --   11/06/21 2039 -- -- -- 59 -- --   11/06/21 2038 -- -- -- 69 -- --   11/06/21 2037 -- -- -- 75 -- --   11/06/21 2036 -- -- -- 67 -- --   11/06/21 2035 -- -- -- 66 -- --   11/06/21 2034 -- -- -- 67 -- --   11/06/21 2033 -- -- -- 70 -- --   11/06/21 2032 -- -- -- 71 -- --   11/06/21 2031 -- -- -- 72 -- --   11/06/21 2030 -- -- -- 69 -- --   11/06/21 2029 -- -- -- 78 -- --   11/06/21 2028 -- -- -- 75 -- --   11/06/21 2027 -- -- -- 84 -- --   11/06/21 2026 -- -- -- 95 -- --   11/06/21 2025 -- -- -- 82 -- --   11/06/21 2024 -- -- -- 107 -- --   11/06/21 2023 -- -- -- 100 -- --   11/06/21 2022 -- -- -- 103 -- --   11/06/21 2021 -- -- -- 87 -- --   11/06/21 2020 -- -- -- 84 -- --   11/06/21 2019 -- -- -- 89 -- --   11/06/21 2018 -- -- -- 84 -- --   11/06/21 2017 -- -- -- 87 -- --   11/06/21 2016 -- -- -- 90 -- --   11/06/21 2015 -- -- -- 87 -- --   11/06/21 2014 -- -- -- 86 -- --   11/06/21 2013 -- -- -- 82 -- --   11/06/21 2012 -- -- -- 82 -- --   11/06/21 2011 -- -- -- 82 -- --   11/06/21 2010 -- -- -- 79 -- --   11/06/21 2009 -- -- -- 75 -- --   11/06/21 2008 -- -- -- 82 -- --   11/06/21 2007 -- -- -- 79 -- --   11/06/21 2006 -- -- -- 84 -- --   11/06/21 2005 -- -- -- 82 -- --   11/06/21 2004 -- -- -- 76 -- --   11/06/21 2003 -- -- -- 82 -- --   11/06/21 2002 -- -- -- 82 -- --   11/06/21 2001 -- -- -- 83 -- --   11/06/21 2000 -- -- -- 78 -- --   11/06/21 1959 -- -- -- 85 -- --   11/06/21 1958 -- -- -- 82 -- --   11/06/21 1957 -- -- -- 82 -- --   11/06/21 1956 -- -- -- 82 -- --   11/06/21 1955 -- -- -- 77 -- --   11/06/21 1954 -- -- -- 81 -- --   11/06/21 1953 -- -- -- 80 -- --   11/06/21 1952 -- -- -- 66 -- --   11/06/21 1951 -- -- -- 79 -- --   11/06/21 1950 -- -- -- 82 -- --   11/06/21 1949 -- -- -- 69 -- --   11/06/21 1948 -- -- -- 77 -- --   11/06/21 1947 -- -- -- 77 -- --   11/06/21 1946 -- -- -- 76 -- --    11/06/21 1945 -- -- -- 63 -- --   11/06/21 1944 -- -- -- 67 -- --   11/06/21 1943 -- -- -- 79 -- --   11/06/21 1942 -- -- -- 78 -- --   11/06/21 1941 -- -- -- 73 -- --   11/06/21 1940 -- -- -- 80 -- --   11/06/21 1939 -- -- -- 85 -- --   11/06/21 1938 -- -- -- 72 -- --   11/06/21 1937 -- -- -- 59 -- --   11/06/21 1936 -- -- -- 81 -- --   11/1935 -- -- -- 80 -- --   11/06/21 1934 -- -- -- 80 -- --   11/06/21 1933 125/72 97.7 °F (36.5 °C) Oral 81 16 95 %   11/06/21 1932 -- -- -- 76 -- --   11/06/21 1931 -- -- -- 78 -- --   11/06/21 1930 -- -- -- 79 -- --   11/06/21 1929 -- -- -- 80 -- --   11/06/21 1928 -- -- -- 70 -- --   11/06/21 1927 -- -- -- 79 -- --   11/06/21 1926 -- -- -- 86 -- --   11/06/21 1925 -- -- -- 81 -- --   11/06/21 1924 -- -- -- 79 -- --   11/06/21 1923 -- -- -- 75 -- --   11/06/21 1922 -- -- -- 85 -- --   11/06/21 1921 -- -- -- 73 -- --   11/06/21 1920 -- -- -- 76 -- --   11/06/21 1919 -- -- -- 80 -- --   11/06/21 1918 -- -- -- 76 -- --   11/06/21 1917 -- -- -- 78 -- --   11/06/21 1916 -- -- -- 84 -- --   11/06/21 1915 -- -- -- 81 -- --   11/06/21 1914 -- -- -- 81 -- --   11/06/21 1913 -- -- -- 81 -- --   11/06/21 1912 -- -- -- 82 -- --   11/06/21 1911 -- -- -- 82 -- --   11/06/21 1910 -- -- -- 80 -- --   11/06/21 1909 -- -- -- 82 -- --   11/06/21 1908 -- -- -- 66 -- --   11/06/21 1907 -- -- -- 80 -- --   11/06/21 1906 -- -- -- 70 -- --   11/06/21 1905 -- -- -- 76 -- --   11/06/21 1904 -- -- -- 82 -- --   11/06/21 1903 -- -- -- 85 -- --   11/06/21 1902 -- -- -- 68 -- --   11/06/21 1901 -- -- -- 73 -- --   11/06/21 1900 -- -- -- 80 -- --   11/06/21 1525 144/53 97.9 °F (36.6 °C) Oral 78 16 97 %   11/06/21 1135 -- -- -- -- 15 94 %   11/06/21 1130 132/63 97.5 °F (36.4 °C) Oral 86 18 95 %      Physical Exam   His neck is supple, no adenopathy his throat shows dry mucosa he is got extra pain of the left lower eyelid his lungs are clear anteriorly and laterally with crackles at both lateral bases  posteriorly cardiac exam reveals a regular rhythm, his abdomen is obese soft his lower legs show trace edema around the ankles and lower PreTAB regions bilaterally,      Result Review    Result Review:  I have personally reviewed the results from the time of this admission to 11/07/21 8:32 AM EST and agree with these findings:  [x]  Laboratory  []  Microbiology  []  Radiology  [x]  EKG/Telemetry   []  Cardiology/Vascular   []  Pathology  []  Old records  []  Other:      Active Hospital Meds:  Scheduled Meds:artificial tears, , Left Eye, BID  aspirin, 81 mg, Oral, Daily  colchicine, 0.6 mg, Oral, Q12H  cyanocobalamin, 1,000 mcg, Intramuscular, Q14 Days  docusate sodium, 100 mg, Oral, BID  escitalopram, 5 mg, Oral, Daily  famotidine, 20 mg, Oral, Nightly  gabapentin, 100 mg, Oral, TID  heparin (porcine), 5,000 Units, Subcutaneous, Q8H  hydrALAZINE, 50 mg, Oral, TID  insulin detemir, 30 Units, Subcutaneous, Nightly  insulin lispro, 0-9 Units, Subcutaneous, TID AC  insulin lispro, 7 Units, Subcutaneous, TID With Meals  isosorbide mononitrate, 30 mg, Oral, QAM  metoprolol succinate XL, 25 mg, Oral, Q24H  polyethylene glycol, 17 g, Oral, Daily  sodium chloride, 10 mL, Intravenous, Q12H      Continuous Infusions:   PRN Meds:.•  acetaminophen **OR** acetaminophen **OR** acetaminophen  •  dextrose  •  dextrose  •  glucagon (human recombinant)  •  nitroglycerin  •  ondansetron  •  sodium chloride  •  sodium chloride    Assessment/Plan   Assessment / Plan     Active Hospital Problems:  Active Hospital Problems    Diagnosis    • Acute congestive heart failure (HCC)        Assessment/Plan:   Bradycardia developing, November 4 fifth, beta-blockers were adjusted and then held on the evening of the fifth, heart rates have improved considerably and he was restarted back on low-dose beta-blocker 25 mg November 6 heart rates have been in the 90s no tachycardia or bradycardia since,     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 BR NP level, still elevated around 10,000, continues hydralazine,, high-dose metoprolol which was stopped evening of November 5, but will be restarted November 6, at much lower doses---continue isosorbide 30 mg daily,, will stop Zestril 10 mg daily-, given renal function--November 5,  can adjust hydralazine if necessary, Imdur, if necessary, continue indwelling Shultz catheter,     Acute kidney injury on top of chronic kidney disease stage IIIb-IV,, creatinine 1.85 improved November 7, will continue trending     Anemia mild--- we'll follow, iron deficiency, received IV iron this admission     Diabetes has developed low blood sugars past several evenings, insulin has had to be adjusted, will follow closely, change Levemir to morning dosing November 7    Coronary artery disease possible type II MI, no chest pains clinically     Hypertension, stable, improved November 7     Morbid obesity, gait dysfunction most likely needs nursing home placement, rehab, discussed with nursing staff to get up in the chair today November 6     Gout, arthritis, continues on colchicine twice a day and possible allopurinol in the near future may need Uloric based on renal function issues,     Depression,, continue Lexapro 5 mg daily     DVT prophylaxis, GI prophylaxis continue,     Vitamin B12 deficiency continues intramuscular dosing every 2 weeks     Peripheral neuropathy, pain, continues on gabapentin low doses 100 mg 3 times a day                 CODE STATUS:    Code Status and Medical Interventions:   Ordered at: 10/28/21 2123     Level Of Support Discussed With:    Patient     Code Status (Patient has no pulse and is not breathing):    CPR (Attempt to Resuscitate)     Medical Interventions (Patient has pulse or is breathing):    Full       Electronically signed by Timothy Iniguez MD, 11/07/21, 8:32 AM EST.

## 2021-11-07 NOTE — PROGRESS NOTES
[facility]     Progress Note    Patient Name: Betty Givens  : 1935  MRN: [id number]  Primary Care Physician:  Mauro Cuellar MD  Date of admission: 10/28/2021    Subjective   Subjective       Breathing better  patient is more alert and oriented.  His heart rate finally has come up to 70 to 80 bpm  No chest pains    Review of Systems  Review of Systems   Constitutional: Negative for appetite change, fatigue and fever.   HENT: Negative for congestion.    Respiratory: Negative for apnea, choking, chest tightness, shortness of breath and wheezing.    Cardiovascular: Negative for chest pain, palpitations and leg swelling.   Gastrointestinal: Negative for diarrhea, nausea and vomiting.   Genitourinary: Negative for dysuria, frequency and hematuria.   Musculoskeletal: Positive for arthralgias. Negative for myalgias.   Skin: Negative for pallor.   Neurological: Negative for dizziness, tremors, syncope and weakness.   Psychiatric/Behavioral: Negative for agitation and confusion.       Objective   Objective     Vitals:   Temp:  [97.3 °F (36.3 °C)-97.9 °F (36.6 °C)] 97.8 °F (36.6 °C)  Heart Rate:  [] 76  Resp:  [15-18] 16  BP: (117-144)/(43-80) 132/72    Physical Exam    Constitutional: Awake, alert   Eyes: PERRLA, sclerae anicteric, no conjunctival injection   HENT: NCAT, mucous membranes moist   Neck: Supple, no thyromegaly, no lymphadenopathy, trachea midline   Respiratory: Clear to auscultation bilaterally, nonlabored respirations    Cardiovascular: RRR, no murmurs, rubs, or gallops, palpable pedal pulses bilaterally   Gastrointestinal: Positive bowel sounds, soft, nontender, nondistended   Musculoskeletal: No bilateral ankle edema, no clubbing or cyanosis to extremities   Psychiatric: Appropriate affect, cooperative   Neurologic: Oriented x 3, strength symmetric in all extremities, Cranial Nerves grossly intact to confrontation, speech clear   Skin: No rashes       Result Review      Result  Review:  I have personally reviewed the results from the time of this admission to 11/03/21 7:34 PM EDT and agree with these findings:  [x]  Laboratory  [x]  Microbiology  [x]  Radiology  [x]  EKG/Telemetry   []  Cardiology/Vascular   []  Pathology  []  Old records  []  Other:        Assessment/Plan   Assessment / Plan     Active Hospital Problems:  Active Hospital Problems    Diagnosis    • Acute congestive heart failure (HCC)    Acute on chronic combined heart failure  Diabetes mellitus uncontrolled  Chronic kidney disease stage IIIb  Hypertension  Depression    Plan  Continue present medications  PT OT for ambulation    DVT prophylaxis:  Medical DVT prophylaxis orders are present.      Time spent including face to face service excluding procedures: 20 minutes          Electronically signed by Beck Carbajal MD, 11/07/21, 7:34 PM EDT.

## 2021-11-08 LAB
ALBUMIN SERPL-MCNC: 3.2 G/DL (ref 3.5–5.2)
ALBUMIN/GLOB SERPL: 0.9 G/DL
ALP SERPL-CCNC: 110 U/L (ref 39–117)
ALT SERPL W P-5'-P-CCNC: 42 U/L (ref 1–41)
ANION GAP SERPL CALCULATED.3IONS-SCNC: 10.2 MMOL/L (ref 5–15)
AST SERPL-CCNC: 100 U/L (ref 1–40)
BASOPHILS # BLD AUTO: 0.06 10*3/MM3 (ref 0–0.2)
BASOPHILS NFR BLD AUTO: 1.4 % (ref 0–1.5)
BILIRUB SERPL-MCNC: 0.3 MG/DL (ref 0–1.2)
BUN SERPL-MCNC: 52 MG/DL (ref 8–23)
BUN/CREAT SERPL: 29.2 (ref 7–25)
CALCIUM SPEC-SCNC: 8.9 MG/DL (ref 8.6–10.5)
CHLORIDE SERPL-SCNC: 106 MMOL/L (ref 98–107)
CO2 SERPL-SCNC: 22.8 MMOL/L (ref 22–29)
CREAT SERPL-MCNC: 1.78 MG/DL (ref 0.76–1.27)
DEPRECATED RDW RBC AUTO: 56.4 FL (ref 37–54)
EOSINOPHIL # BLD AUTO: 0.54 10*3/MM3 (ref 0–0.4)
EOSINOPHIL NFR BLD AUTO: 12.6 % (ref 0.3–6.2)
ERYTHROCYTE [DISTWIDTH] IN BLOOD BY AUTOMATED COUNT: 17.2 % (ref 12.3–15.4)
GFR SERPL CREATININE-BSD FRML MDRD: 36 ML/MIN/1.73
GLOBULIN UR ELPH-MCNC: 3.5 GM/DL
GLUCOSE BLDC GLUCOMTR-MCNC: 161 MG/DL (ref 70–99)
GLUCOSE BLDC GLUCOMTR-MCNC: 189 MG/DL (ref 70–99)
GLUCOSE BLDC GLUCOMTR-MCNC: 240 MG/DL (ref 70–99)
GLUCOSE BLDC GLUCOMTR-MCNC: 98 MG/DL (ref 70–99)
GLUCOSE SERPL-MCNC: 161 MG/DL (ref 65–99)
HCT VFR BLD AUTO: 41.3 % (ref 37.5–51)
HGB BLD-MCNC: 12.5 G/DL (ref 13–17.7)
IMM GRANULOCYTES # BLD AUTO: 0.06 10*3/MM3 (ref 0–0.05)
IMM GRANULOCYTES NFR BLD AUTO: 1.4 % (ref 0–0.5)
LYMPHOCYTES # BLD AUTO: 1.05 10*3/MM3 (ref 0.7–3.1)
LYMPHOCYTES NFR BLD AUTO: 24.4 % (ref 19.6–45.3)
MAGNESIUM SERPL-MCNC: 2.4 MG/DL (ref 1.6–2.4)
MCH RBC QN AUTO: 27.2 PG (ref 26.6–33)
MCHC RBC AUTO-ENTMCNC: 30.3 G/DL (ref 31.5–35.7)
MCV RBC AUTO: 90 FL (ref 79–97)
MONOCYTES # BLD AUTO: 0.45 10*3/MM3 (ref 0.1–0.9)
MONOCYTES NFR BLD AUTO: 10.5 % (ref 5–12)
NEUTROPHILS NFR BLD AUTO: 2.14 10*3/MM3 (ref 1.7–7)
NEUTROPHILS NFR BLD AUTO: 49.7 % (ref 42.7–76)
NRBC BLD AUTO-RTO: 0 /100 WBC (ref 0–0.2)
NT-PROBNP SERPL-MCNC: 9511 PG/ML (ref 0–1800)
PLATELET # BLD AUTO: 361 10*3/MM3 (ref 140–450)
PMV BLD AUTO: 8.7 FL (ref 6–12)
POTASSIUM SERPL-SCNC: 4.5 MMOL/L (ref 3.5–5.2)
PROT SERPL-MCNC: 6.7 G/DL (ref 6–8.5)
RBC # BLD AUTO: 4.59 10*6/MM3 (ref 4.14–5.8)
SODIUM SERPL-SCNC: 139 MMOL/L (ref 136–145)
WBC # BLD AUTO: 4.3 10*3/MM3 (ref 3.4–10.8)

## 2021-11-08 PROCEDURE — 63710000001 INSULIN DETEMIR PER 5 UNITS: Performed by: INTERNAL MEDICINE

## 2021-11-08 PROCEDURE — 94799 UNLISTED PULMONARY SVC/PX: CPT

## 2021-11-08 PROCEDURE — 25010000002 HEPARIN (PORCINE) PER 1000 UNITS: Performed by: INTERNAL MEDICINE

## 2021-11-08 PROCEDURE — 80053 COMPREHEN METABOLIC PANEL: CPT | Performed by: INTERNAL MEDICINE

## 2021-11-08 PROCEDURE — 82962 GLUCOSE BLOOD TEST: CPT

## 2021-11-08 PROCEDURE — 83735 ASSAY OF MAGNESIUM: CPT | Performed by: INTERNAL MEDICINE

## 2021-11-08 PROCEDURE — 63710000001 INSULIN LISPRO (HUMAN) PER 5 UNITS: Performed by: INTERNAL MEDICINE

## 2021-11-08 PROCEDURE — 83880 ASSAY OF NATRIURETIC PEPTIDE: CPT | Performed by: INTERNAL MEDICINE

## 2021-11-08 PROCEDURE — 85025 COMPLETE CBC W/AUTO DIFF WBC: CPT | Performed by: INTERNAL MEDICINE

## 2021-11-08 PROCEDURE — 97168 OT RE-EVAL EST PLAN CARE: CPT

## 2021-11-08 PROCEDURE — 99233 SBSQ HOSP IP/OBS HIGH 50: CPT | Performed by: INTERNAL MEDICINE

## 2021-11-08 RX ADMIN — MINERAL OIL, PETROLATUM: 425; 568 OINTMENT OPHTHALMIC at 07:54

## 2021-11-08 RX ADMIN — SODIUM CHLORIDE, PRESERVATIVE FREE 10 ML: 5 INJECTION INTRAVENOUS at 07:56

## 2021-11-08 RX ADMIN — INSULIN LISPRO 7 UNITS: 100 INJECTION, SOLUTION INTRAVENOUS; SUBCUTANEOUS at 18:29

## 2021-11-08 RX ADMIN — ASPIRIN 81 MG: 81 TABLET, COATED ORAL at 07:54

## 2021-11-08 RX ADMIN — DOCUSATE SODIUM 100 MG: 100 CAPSULE, LIQUID FILLED ORAL at 07:55

## 2021-11-08 RX ADMIN — METOPROLOL SUCCINATE 25 MG: 25 TABLET, EXTENDED RELEASE ORAL at 07:56

## 2021-11-08 RX ADMIN — POLYETHYLENE GLYCOL 3350 17 G: 17 POWDER, FOR SOLUTION ORAL at 07:56

## 2021-11-08 RX ADMIN — HEPARIN SODIUM 5000 UNITS: 5000 INJECTION, SOLUTION INTRAVENOUS; SUBCUTANEOUS at 20:34

## 2021-11-08 RX ADMIN — INSULIN LISPRO 7 UNITS: 100 INJECTION, SOLUTION INTRAVENOUS; SUBCUTANEOUS at 07:53

## 2021-11-08 RX ADMIN — HYDRALAZINE HYDROCHLORIDE 50 MG: 50 TABLET, FILM COATED ORAL at 18:11

## 2021-11-08 RX ADMIN — COLCHICINE 0.6 MG: 0.6 TABLET, FILM COATED ORAL at 20:34

## 2021-11-08 RX ADMIN — ISOSORBIDE MONONITRATE 30 MG: 30 TABLET ORAL at 06:14

## 2021-11-08 RX ADMIN — INSULIN DETEMIR 30 UNITS: 100 INJECTION, SOLUTION SUBCUTANEOUS at 08:52

## 2021-11-08 RX ADMIN — GABAPENTIN 100 MG: 100 CAPSULE ORAL at 20:34

## 2021-11-08 RX ADMIN — INSULIN LISPRO 2 UNITS: 100 INJECTION, SOLUTION INTRAVENOUS; SUBCUTANEOUS at 18:29

## 2021-11-08 RX ADMIN — COLCHICINE 0.6 MG: 0.6 TABLET, FILM COATED ORAL at 07:55

## 2021-11-08 RX ADMIN — HYDRALAZINE HYDROCHLORIDE 50 MG: 50 TABLET, FILM COATED ORAL at 20:32

## 2021-11-08 RX ADMIN — SODIUM CHLORIDE, PRESERVATIVE FREE 10 ML: 5 INJECTION INTRAVENOUS at 20:45

## 2021-11-08 RX ADMIN — MINERAL OIL, PETROLATUM: 425; 568 OINTMENT OPHTHALMIC at 20:32

## 2021-11-08 RX ADMIN — GABAPENTIN 100 MG: 100 CAPSULE ORAL at 07:55

## 2021-11-08 RX ADMIN — FAMOTIDINE 20 MG: 20 TABLET, FILM COATED ORAL at 20:33

## 2021-11-08 RX ADMIN — HEPARIN SODIUM 5000 UNITS: 5000 INJECTION, SOLUTION INTRAVENOUS; SUBCUTANEOUS at 06:18

## 2021-11-08 RX ADMIN — HYDRALAZINE HYDROCHLORIDE 50 MG: 50 TABLET, FILM COATED ORAL at 07:55

## 2021-11-08 RX ADMIN — HEPARIN SODIUM 5000 UNITS: 5000 INJECTION, SOLUTION INTRAVENOUS; SUBCUTANEOUS at 12:21

## 2021-11-08 RX ADMIN — INSULIN LISPRO 7 UNITS: 100 INJECTION, SOLUTION INTRAVENOUS; SUBCUTANEOUS at 12:21

## 2021-11-08 RX ADMIN — INSULIN LISPRO 2 UNITS: 100 INJECTION, SOLUTION INTRAVENOUS; SUBCUTANEOUS at 07:52

## 2021-11-08 RX ADMIN — GABAPENTIN 100 MG: 100 CAPSULE ORAL at 18:12

## 2021-11-08 RX ADMIN — INSULIN LISPRO 4 UNITS: 100 INJECTION, SOLUTION INTRAVENOUS; SUBCUTANEOUS at 12:20

## 2021-11-08 RX ADMIN — ESCITALOPRAM OXALATE 5 MG: 10 TABLET ORAL at 07:54

## 2021-11-08 RX ADMIN — HEPARIN SODIUM 5000 UNITS: 5000 INJECTION, SOLUTION INTRAVENOUS; SUBCUTANEOUS at 21:21

## 2021-11-08 NOTE — PLAN OF CARE
Goal Outcome Evaluation:  Plan of Care Reviewed With: patient        Progress: improving  Outcome Summary: walked with walker-needs verbal cues for transfers. transfered from chair to bed. up to chair for meals withot complciations. thomas intact.

## 2021-11-08 NOTE — THERAPY RE-EVALUATION
Patient Name: Betty Givens  : 1935    MRN: [id number]                              Today's Date: 2021       Admit Date: 10/28/2021    Visit Dx:     ICD-10-CM ICD-9-CM   1. Acute congestive heart failure, unspecified heart failure type (HCC)  I50.9 428.0   2. B12 deficiency  E53.8 266.2   3. Decreased activities of daily living (ADL)  Z78.9 V49.89   4. Difficulty in walking  R26.2 719.7     Patient Active Problem List   Diagnosis   • Essential hypertension   • Mixed hyperlipidemia   • Non-pressure chronic ulcer of other part of left foot with unspecified severity (HCC)   • Ulcer of foot due to type 2 diabetes mellitus (HCC)   • Type 2 diabetes mellitus without complication, with long-term current use of insulin (HCC)   • Stage 3b chronic kidney disease (HCC)   • Atherosclerosis of native coronary artery of native heart without angina pectoris   • Chronic combined systolic (congestive) and diastolic (congestive) heart failure (HCC)   • Primary osteoarthritis involving multiple joints   • Iron deficiency anemia secondary to inadequate dietary iron intake   • Other fatigue   • Acute idiopathic gout of right hand   • Acute congestive heart failure (HCC)     Past Medical History:   Diagnosis Date   • Anasarca    • Anemia, unspecified    • Atherosclerotic heart disease of native coronary artery without angina pectoris    • Chronic diastolic (congestive) heart failure (HCC)    • Chronic kidney disease, stage 3 ([facility])     moderate   • DM (diabetes mellitus) ([facility])    • Essential (primary) hypertension    • Family history of colonic polyps    • Hypercholesterolemia    • Mixed hyperlipidemia    • Non-pressure chronic ulcer of other part of left foot with unspecified severity (HCC)    • Pure hypercholesterolemia    • Tachycardia    • Type 2 diabetes mellitus with diabetic chronic kidney disease (HCC)     without complications; with foot ulcer   • Unspecified osteoarthritis, unspecified site      Past Surgical  History:   Procedure Laterality Date   • CATARACT EXTRACTION Left    • CHOLECYSTECTOMY     • EYE SURGERY Right    • SKIN CANCER DESTRUCTION  06/2019;6/2018    removal from hand; forehead       General Information     Row Name 11/08/21 1435          OT Time and Intention    Document Type re-evaluation  -ES     Mode of Treatment individual therapy; occupational therapy  -ES     Row Name 11/08/21 1435          General Information    Patient Profile Reviewed yes  -ES     Existing Precautions/Restrictions fall  -ES     Barriers to Rehab hearing deficit  -ES     Row Name 11/08/21 1435          Cognition    Orientation Status (Cognition) oriented to; person  Patient pleasantly confused throughout re assessment requires frequent cueing  -ES     Row Name 11/08/21 1435          Safety Issues, Functional Mobility    Safety Issues Affecting Function (Mobility) impulsivity; safety precaution awareness; safety precautions follow-through/compliance  -ES     Impairments Affecting Function (Mobility) balance; endurance/activity tolerance; strength  -ES           User Key  (r) = Recorded By, (t) = Taken By, (c) = Cosigned By    Initials Name Provider Type    ES Liana Penny, OT Occupational Therapist                 Mobility/ADL's     Row Name 11/08/21 1436          Bed Mobility    Bed Mobility supine-sit; sit-supine  -ES     Supine-Sit [place] (Bed Mobility) moderate assist (50% patient effort); verbal cues  HHA provided for trunk elevation  -ES     Sit-Supine [place] (Bed Mobility) moderate assist (50% patient effort); verbal cues  w/ LE management to return to supine  -ES     Row Name 11/08/21 1436          Transfers    Comment (Transfers) Patient declined transfers  -ES     Row Name 11/08/21 1436          Functional Mobility    Functional Mobility- Comment Patient declined  -ES     Row Name 11/08/21 1436          Activities of Daily Living    BADL Assessment/Intervention --  I with feeding. Mod A UB. Max A LB.  Dependent with toileting (thomas). S/U grooming at bed level.  -ES           User Key  (r) = Recorded By, (t) = Taken By, (c) = Cosigned By    Initials Name Provider Type    Liana Ramirez OT Occupational Therapist               Obj/Interventions     Row Name 11/08/21 1438          Sensory Assessment (Somatosensory)    Sensory Assessment (Somatosensory) UE sensation intact  -ES     Row Name 11/08/21 1438          Vision Assessment/Intervention    Visual Impairment/Limitations WFL  -ES     Row Name 11/08/21 1438          Range of Motion Comprehensive    General Range of Motion bilateral upper extremity ROM WNL  -ES     Row Name 11/08/21 1438          Strength Comprehensive (MMT)    Comment, General Manual Muscle Testing (MMT) Assessment BUE grossly 4/5  -ES     Row Name 11/08/21 1438          Motor Skills    Coordination WFL  -ES     Functional Endurance fair minus  -ES     Row Name 11/08/21 1438          Balance    Balance Assessment sitting static balance  -ES     Static Sitting Balance WFL  -ES     Dynamic Sitting Balance mild impairment  -ES     Balance Interventions occupation based/functional task; sitting; minimal challenge  -ES           User Key  (r) = Recorded By, (t) = Taken By, (c) = Cosigned By    Initials Name Provider Type    Liana Ramirez OT Occupational Therapist               Goals/Plan     Row Name 11/08/21 1442          Bed Mobility Goal 1 (OT)    Activity/Assistive Device (Bed Mobility Goal 1, OT) bed mobility activities, all  -ES     [place] Level/Cues Needed (Bed Mobility Goal 1, OT) minimum assist (75% or more patient effort)  -ES     Time Frame (Bed Mobility Goal 1, OT) long term goal (LTG); 10 days  -ES     Progress/Outcomes (Bed Mobility Goal 1, OT) goal revised this date  -ES     Row Name 11/08/21 1442          Transfer Goal 1 (OT)    Activity/Assistive Device (Transfer Goal 1, OT) transfers, all  -ES     [place] Level/Cues Needed (Transfer Goal 1, OT) minimum assist (75%  or more patient effort)  -ES     Time Frame (Transfer Goal 1, OT) long term goal (LTG); 10 days  -ES     Progress/Outcome (Transfer Goal 1, OT) goal revised this date  -ES     Row Name 11/08/21 1442          Bathing Goal 1 (OT)    Activity/Device (Bathing Goal 1, OT) bathing skills, all  -ES     [place] Level/Cues Needed (Bathing Goal 1, OT) minimum assist (75% or more patient effort)  -ES     Time Frame (Bathing Goal 1, OT) long term goal (LTG); 10 days  -ES     Progress/Outcomes (Bathing Goal 1, OT) goal revised this date  -ES     Row Name 11/08/21 1442          Dressing Goal 1 (OT)    Activity/Device (Dressing Goal 1, OT) dressing skills, all  -ES     [place]/Cues Needed (Dressing Goal 1, OT) minimum assist (75% or more patient effort)  -ES     Time Frame (Dressing Goal 1, OT) long term goal (LTG); 10 days  -ES     Progress/Outcome (Dressing Goal 1, OT) goal revised this date  -ES     Row Name 11/08/21 1442          Toileting Goal 1 (OT)    Activity/Device (Toileting Goal 1, OT) toileting skills, all  -ES     [place] Level/Cues Needed (Toileting Goal 1, OT) minimum assist (75% or more patient effort)  -ES     Time Frame (Toileting Goal 1, OT) long term goal (LTG); 10 days  -ES     Progress/Outcome (Toileting Goal 1, OT) goal revised this date  -ES     Row Name 11/08/21 1442          Grooming Goal 1 (OT)    Activity/Device (Grooming Goal 1, OT) grooming skills, all  -ES     [place] (Grooming Goal 1, OT) set-up required  -ES     Time Frame (Grooming Goal 1, OT) long term goal (LTG); 10 days  -ES     Progress/Outcome (Grooming Goal 1, OT) goal ongoing  -ES     Row Name 11/08/21 1442          Strength Goal 1 (OT)    Strength Goal 1 (OT) Pt will increase BUE strength 1/2 muscle grade w/ HEP provided handouts and demo for increased UE strength required for ADL transfers and task completion  -ES     Time Frame (Strength Goal 1, OT) long term goal (LTG); 10 days  -ES     Row Name 11/08/21 1442           Therapy Assessment/Plan (OT)    Planned Therapy Interventions (OT) activity tolerance training; BADL retraining; functional balance retraining; occupation/activity based interventions; patient/caregiver education/training; ROM/therapeutic exercise; strengthening exercise; transfer/mobility retraining  -ES           User Key  (r) = Recorded By, (t) = Taken By, (c) = Cosigned By    Initials Name Provider Type    Liana Ramirez OT Occupational Therapist               Clinical Impression     Row Name 11/08/21 1439          Plan of Care Review    Plan of Care Reviewed With patient  -ES     Progress improving  -ES     Outcome Summary Reassessment completed this session to evaluate patient progress towards goals established in POC. Patient has made slow progress with therapy, continues to demo decreased UE strength, functional balance, activity tolerance, and decreased ability to perform ADL transfers and task completion. Patient would benefit from continued skilled OT intervention to promote return to baseline.  -ES     Row Name 11/08/21 1430          Therapy Assessment/Plan (OT)    Rehab Potential (OT) good, to achieve stated therapy goals  -ES     Criteria for Skilled Therapeutic Interventions Met (OT) yes; meets criteria; skilled treatment is necessary  -ES     Therapy Frequency (OT) 5 times/wk  -ES     Row Name 11/08/21 1430          Therapy Plan Review/Discharge Plan (OT)    Anticipated Discharge Disposition (OT) sub acute care setting  -ES           User Key  (r) = Recorded By, (t) = Taken By, (c) = Cosigned By    Initials Name Provider Type    Liana Ramirez OT Occupational Therapist               Outcome Measures     Row Name 11/08/21 1442          How much help from another is currently needed...    Putting on and taking off regular lower body clothing? 2  -ES     Bathing (including washing, rinsing, and drying) 2  -ES     Toileting (which includes using toilet bed pan or urinal) 2  -ES     Putting on  and taking off regular upper body clothing 3  -ES     Taking care of personal grooming (such as brushing teeth) 3  -ES     Eating meals 4  -ES     AM-PAC 6 Clicks Score (OT) 16  -ES     Row Name 11/08/21 1443          Functional Assessment    Outcome Measure Options AM-PAC 6 Clicks Daily Activity (OT); Optimal Instrument  -ES     Row Name 11/08/21 1443          Optimal Instrument    Optimal Instrument Optimal - 3  -ES     Bending/Stooping 3  -ES     Standing 3  -ES     Reaching 2  -ES           User Key  (r) = Recorded By, (t) = Taken By, (c) = Cosigned By    Initials Name Provider Type    Liana Ramirez OT Occupational Therapist                Occupational Therapy Education                 Title: PT OT SLP Therapies (Done)     Topic: Occupational Therapy (Done)     Point: ADL training (Done)     Description:   Instruct learner(s) on proper safety adaptation and remediation techniques during self care or transfers.   Instruct in proper use of assistive devices.              Learning Progress Summary           Patient Acceptance, E,TB, VU,NR by ALINA at 11/5/2021 0154    Acceptance, E,TB, VU by MERCEDES at 10/31/2021 1757    Acceptance, E,TB, VU by ALINA at 10/31/2021 0148                   Point: Home exercise program (Done)     Description:   Instruct learner(s) on appropriate technique for monitoring, assisting and/or progressing therapeutic exercises/activities.              Learning Progress Summary           Patient Acceptance, E,TB, VU,NR by ALINA at 11/5/2021 0154    Acceptance, E,TB, VU by MERCEDES at 10/31/2021 1757    Acceptance, E,TB, VU by DG at 10/31/2021 0148                   Point: Precautions (Done)     Description:   Instruct learner(s) on prescribed precautions during self-care and functional transfers.              Learning Progress Summary           Patient Acceptance, E,TB, VU,NR by AILNA at 11/5/2021 0154    Acceptance, E,TB, VU by AW at 10/31/2021 1757    Acceptance, E,TB, VU by DG at 10/31/2021 0148                    Point: Body mechanics (Done)     Description:   Instruct learner(s) on proper positioning and spine alignment during self-care, functional mobility activities and/or exercises.              Learning Progress Summary           Patient Acceptance, E,TB, VU,NR by  at 11/5/2021 0154    Acceptance, E,TB, VU by  at 10/31/2021 1757    Acceptance, E,TB, VU by  at 10/31/2021 0148                               User Key     Initials Effective Dates Name Provider Type Discipline     06/21/21 -  Elsa Mccall, RN Registered Nurse Nurse    ALINA 07/15/21 -  Loli Posada, RN Registered Nurse Nurse              OT Recommendation and Plan  Planned Therapy Interventions (OT): activity tolerance training, BADL retraining, functional balance retraining, occupation/activity based interventions, patient/caregiver education/training, ROM/therapeutic exercise, strengthening exercise, transfer/mobility retraining  Therapy Frequency (OT): 5 times/wk  Plan of Care Review  Plan of Care Reviewed With: patient  Progress: improving  Outcome Summary: Reassessment completed this session to evaluate patient progress towards goals established in POC. Patient has made slow progress with therapy, continues to demo decreased UE strength, functional balance, activity tolerance, and decreased ability to perform ADL transfers and task completion. Patient would benefit from continued skilled OT intervention to promote return to baseline.     Time Calculation:    Time Calculation- OT     Row Name 11/08/21 1444             Time Calculation- OT    OT Received On 11/08/21  -ES      OT Goal Re-Cert Due Date 11/17/21  -ES              Untimed Charges    OT Eval/Re-eval Minutes 25  -ES              Total Minutes    Untimed Charges Total Minutes 25  -ES       Total Minutes 25  -ES            User Key  (r) = Recorded By, (t) = Taken By, (c) = Cosigned By    Initials Name Provider Type    ES Liana Penny, OT Occupational Therapist               Therapy Charges for Today     Code Description Service Date Service Provider Modifiers Qty    [id number] HC OT RE-EVAL 2 11/8/2021 Liana Penny, SHANAE GO 1               Liana Penny OT  11/8/2021

## 2021-11-08 NOTE — PROGRESS NOTES
[facility]   Progress Note    Patient Name: Betty Givens  : 1935  MRN: [id number]  Primary Care Physician: Mauro Cuellar MD  Date of admission: 10/28/2021    Subjective   Subjective   HPI: Congestive heart failure exacerbation, altered mental status    Patient seems a little more drowsy today but he is arousable, he says he is okay does not have any distress or in pain, he seems to be breathing comfortably, but he drifts off to sleep if left alone,    Telemetry--, sinus rhythm, no significant arrhythmias according to the technician,    Discussed case with  care coordinator today  about plan of care he has a bed offer at [place] as of  and does not need another Covid test,    Review of Systems   All systems were reviewed and negative except for: Denies pain, or shortness of breath currently, no fevers or reported issues overnight by nursing staff,      Objective   Objective     Vitals:  Patient Vitals for the past 24 hrs:   BP Temp Temp src Pulse Resp SpO2   21 0701 135/81 97.5 °F (36.4 °C) Oral 81 18 98 %   21 0614 153/68 -- -- 96 -- --   21 0415 144/64 97.1 °F (36.2 °C) Oral 86 18 96 %   21 2340 -- -- -- 67 -- --   21 2335 -- -- -- 61 -- --   21 233 -- -- -- 70 -- --   215 -- -- -- 70 -- --   21 -- -- -- 70 -- --   21 2315 123/58 97.3 °F (36.3 °C) Oral 78 18 97 %   21 -- -- -- 65 -- --   21 -- -- -- 60 -- --   21 -- -- -- 75 -- --   21 -- -- -- 80 -- --   21 -- -- -- 81 -- --   21 -- -- -- 56 -- --   21 -- -- -- 69 -- --   21 -- -- -- 56 -- --   21 -- -- -- 61 -- --   21 -- -- -- 70 -- --   21 -- -- -- 57 -- --   21 -- -- -- 69 -- --   21 -- -- -- 77 -- --   21 -- -- -- 79 -- --   21 -- -- -- 62 -- --   21 -- -- -- 60 --  --   11/07/21 2150 -- -- -- 77 -- --   11/07/21 2145 -- -- -- 69 -- --   11/07/21 2140 -- -- -- 76 -- --   11/07/21 2135 -- -- -- 67 -- --   11/07/21 2130 -- -- -- 98 -- --   11/07/21 2125 -- -- -- 87 -- --   11/07/21 2120 -- -- -- 83 -- --   11/07/21 2115 -- -- -- 78 -- --   11/07/21 2110 -- -- -- 55 -- --   11/07/21 2105 -- -- -- 57 -- --   11/07/21 2100 -- -- -- 65 -- --   11/07/21 2055 -- -- -- 68 -- --   11/07/21 2050 -- -- -- 83 -- --   11/07/21 2045 -- -- -- 71 -- --   11/07/21 2040 -- -- -- 79 -- --   11/07/21 2035 -- -- -- 58 -- --   11/07/21 2030 -- -- -- 53 -- --   11/07/21 2025 -- -- -- 73 -- --   11/07/21 2020 -- -- -- 64 -- --   11/07/21 2015 -- -- -- 73 -- --   11/07/21 2010 -- -- -- 55 -- --   11/07/21 2005 -- -- -- 75 -- --   11/07/21 2000 -- -- -- 56 -- --   11/07/21 1950 -- -- -- 70 -- --   11/07/21 1945 -- -- -- 66 -- --   11/07/21 1940 -- -- -- 60 -- --   11/1935 -- -- -- 82 -- --   11/07/21 1930 130/65 97.9 °F (36.6 °C) Oral 91 20 96 %   11/07/21 1925 -- -- -- 58 -- --   11/07/21 1920 -- -- -- 60 -- --   11/07/21 1915 -- -- -- 77 -- --   11/07/21 1910 -- -- -- 63 -- --   11/07/21 1905 -- -- -- 79 -- --   11/07/21 1900 -- -- -- 79 -- --   11/07/21 1855 -- -- -- 83 -- --   11/07/21 1850 -- -- -- 81 -- --   11/07/21 1845 -- -- -- 78 -- --   11/07/21 1840 -- -- -- 78 -- --   11/07/21 1835 -- -- -- 81 -- --   11/07/21 1830 -- -- -- 60 -- --   11/07/21 1825 -- -- -- 66 -- --   11/07/21 1820 -- -- -- 64 -- --   11/07/21 1815 -- -- -- 75 -- --   11/07/21 1810 -- -- -- 75 -- --   11/07/21 1805 -- -- -- 100 -- --   11/07/21 1800 -- -- -- 74 -- --   11/07/21 1755 -- -- -- 74 -- --   11/07/21 1750 -- -- -- 70 -- --   11/07/21 1745 -- -- -- 76 -- --   11/07/21 1740 -- -- -- 80 -- --   11/07/21 1735 -- -- -- 69 -- --   11/07/21 1730 -- -- -- 81 -- --   11/07/21 1725 -- -- -- 88 -- --   11/07/21 1720 -- -- -- 74 -- --   11/07/21 1715 -- -- -- 104 -- --   11/07/21 1710 -- -- -- 70 -- --   11/07/21 170  -- -- -- 78 -- --   11/07/21 1700 -- -- -- 77 -- --   11/07/21 1655 -- -- -- 77 -- --   11/07/21 1650 -- -- -- 77 -- --   11/07/21 1645 -- -- -- 64 -- --   11/07/21 1640 -- -- -- 82 -- --   11/07/21 1635 -- -- -- 57 -- --   11/07/21 1630 -- -- -- 69 -- --   11/07/21 1625 -- -- -- 80 -- --   11/07/21 1620 -- -- -- 61 -- --   11/07/21 1615 -- -- -- 80 -- --   11/07/21 1610 -- -- -- 71 -- --   11/07/21 1605 -- -- -- 76 -- --   11/07/21 1600 -- -- -- 76 -- --   11/07/21 1555 -- -- -- 69 -- --   11/07/21 1550 -- -- -- 75 -- --   11/07/21 1545 -- -- -- 66 -- --   11/07/21 1540 -- -- -- 75 -- --   11/07/21 1535 -- -- -- 65 -- --   11/07/21 1530 -- -- -- 71 -- --   11/07/21 1525 -- -- -- 62 -- --   11/07/21 1520 -- -- -- 84 -- --   11/07/21 1515 -- -- -- 78 -- --   11/07/21 1510 -- -- -- 69 -- --   11/07/21 1505 -- -- -- 83 -- --   11/07/21 1500 143/58 97.9 °F (36.6 °C) Oral 76 16 97 %   11/07/21 1455 -- -- -- 81 -- --   11/07/21 1450 -- -- -- 63 -- --   11/07/21 1445 -- -- -- 74 -- --   11/07/21 1440 -- -- -- 61 -- --   11/07/21 1435 -- -- -- 80 -- --   11/07/21 1430 -- -- -- 75 -- --   11/07/21 1425 -- -- -- 76 -- --   11/07/21 1420 -- -- -- 73 -- --   11/07/21 1415 -- -- -- 79 -- --   11/07/21 1410 -- -- -- 76 -- --   11/07/21 1405 -- -- -- 69 -- --   11/07/21 1400 -- -- -- 72 -- --   11/07/21 1046 132/72 97.8 °F (36.6 °C) Oral 76 16 93 %   11/07/21 1009 -- -- -- -- -- 95 %      Physical Exam   His lungs are clear with diminished breath sounds cardiac exam reveals a regular rhythm with distant heart tones abdomen is morbidly obese his lower legs show trace edema around the ankles and feet but no rashes skin is dry throughout, he is got ectropion of the left eyelid which is unchanged from my previous exams he arouses but he is a little more sleepy today than usual, his neck is supple without any adenopathy or swelling in the supraclavicular areas, he can move his arms to command      Result Review    Result Review:  I  have personally reviewed the results from the time of this admission to 11/08/21 8:35 AM EST and agree with these findings:  [x]  Laboratory  []  Microbiology  []  Radiology  [x]  EKG/Telemetry   [x]  Cardiology/Vascular   []  Pathology  []  Old records  []  Other:      Active Hospital Meds:  Scheduled Meds:artificial tears, , Left Eye, BID  aspirin, 81 mg, Oral, Daily  colchicine, 0.6 mg, Oral, Q12H  cyanocobalamin, 1,000 mcg, Intramuscular, Q14 Days  docusate sodium, 100 mg, Oral, BID  escitalopram, 5 mg, Oral, Daily  famotidine, 20 mg, Oral, Nightly  gabapentin, 100 mg, Oral, TID  heparin (porcine), 5,000 Units, Subcutaneous, Q8H  hydrALAZINE, 50 mg, Oral, TID  insulin detemir, 30 Units, Subcutaneous, Daily  insulin lispro, 0-9 Units, Subcutaneous, TID AC  insulin lispro, 7 Units, Subcutaneous, TID With Meals  isosorbide mononitrate, 30 mg, Oral, QAM  metoprolol succinate XL, 25 mg, Oral, Q24H  polyethylene glycol, 17 g, Oral, Daily  sodium chloride, 10 mL, Intravenous, Q12H      Continuous Infusions:   PRN Meds:.•  acetaminophen **OR** acetaminophen **OR** acetaminophen  •  dextrose  •  dextrose  •  glucagon (human recombinant)  •  nitroglycerin  •  ondansetron  •  sodium chloride  •  sodium chloride    Assessment/Plan   Assessment / Plan     Active Hospital Problems:  Active Hospital Problems    Diagnosis    • Acute congestive heart failure (HCC)        Assessment/Plan:     Bradycardia developing, November 4 fifth, beta-blockers were adjusted and then held on the evening of the fifth, heart rates have improved considerably and he was restarted back on low-dose beta-blocker 25 mg November 6 heart rates have been in the 90s, -----no tachycardia or bradycardia since, seems to be doing better overall on the telemetry November 8,     Acute on chronic combined congestive heart failure, clinically stable, BR NP levels at 9500 stable over the past 4 days November 8, 2021-------------, continues hydralazine,, high-dose  metoprolol which was stopped evening of November 5, but will be restarted November 6, at much lower doses---continue isosorbide 30 mg daily,, will stop Zestril 10 mg daily-, given renal function--November 5,  can adjust hydralazine if necessary, Imdur, if necessary, continue indwelling Shultz catheter, would consider Aldactone but renal function and already mildly elevated potassium may be contraindicated    Acute kidney injury on top of chronic kidney disease stage IIIb-IV,, creatinine 1.85 improved November 7, and further slight improvement November 8,------ will continue trending     Anemia mild--- stable November 8, 2021 we'll follow, iron deficiency, received IV iron this admission     Diabetes has developed low blood sugars past several evenings, insulin has had to be adjusted, will follow closely, change Levemir to morning dosing November 7, overall much improved stable November 8     Coronary artery disease possible type II MI, no chest pains clinically     Hypertension, stable, improved November 7     Morbid obesity, gait dysfunction most likely needs nursing home placement, rehab, discussed with nursing staff to get up in the chair today November 6     Gout, arthritis, continues on colchicine twice a day and possible allopurinol in the near future may need Uloric based on renal function issues,     Depression,, continue Lexapro 5 mg daily     DVT prophylaxis, GI prophylaxis continue,     Vitamin B12 deficiency continues intramuscular dosing every 2 weeks     Peripheral neuropathy, pain, continues on gabapentin low doses 100 mg 3 times a day     Needs placement to nursing home for care and continued monitoring closely with routine labs given current medical situation discussed with social service Hellen, and care coordination regarding placement issues possible to [facility] soon, will defer to primary team tomorrow      CODE STATUS:    Code Status and Medical Interventions:   Ordered at: 10/28/21 2123      Level Of Support Discussed With:    Patient     Code Status (Patient has no pulse and is not breathing):    CPR (Attempt to Resuscitate)     Medical Interventions (Patient has pulse or is breathing):    Full       Electronically signed by Timothy Iniguez MD, 11/08/21, 8:35 AM EST.

## 2021-11-09 VITALS
BODY MASS INDEX: 34.77 KG/M2 | RESPIRATION RATE: 22 BRPM | WEIGHT: 262.35 LBS | HEIGHT: 73 IN | OXYGEN SATURATION: 97 % | TEMPERATURE: 97.9 F | DIASTOLIC BLOOD PRESSURE: 82 MMHG | HEART RATE: 94 BPM | SYSTOLIC BLOOD PRESSURE: 140 MMHG

## 2021-11-09 DIAGNOSIS — E11.43 TYPE 2 DIABETES MELLITUS WITH DIABETIC AUTONOMIC NEUROPATHY, WITH LONG-TERM CURRENT USE OF INSULIN (HCC): Primary | ICD-10-CM

## 2021-11-09 DIAGNOSIS — Z79.4 TYPE 2 DIABETES MELLITUS WITH DIABETIC AUTONOMIC NEUROPATHY, WITH LONG-TERM CURRENT USE OF INSULIN (HCC): Primary | ICD-10-CM

## 2021-11-09 LAB
GLUCOSE BLDC GLUCOMTR-MCNC: 166 MG/DL (ref 70–99)
GLUCOSE BLDC GLUCOMTR-MCNC: 212 MG/DL (ref 70–99)

## 2021-11-09 PROCEDURE — 97110 THERAPEUTIC EXERCISES: CPT

## 2021-11-09 PROCEDURE — 25010000002 HEPARIN (PORCINE) PER 1000 UNITS: Performed by: INTERNAL MEDICINE

## 2021-11-09 PROCEDURE — 63710000001 INSULIN LISPRO (HUMAN) PER 5 UNITS: Performed by: INTERNAL MEDICINE

## 2021-11-09 PROCEDURE — 63710000001 INSULIN DETEMIR PER 5 UNITS: Performed by: INTERNAL MEDICINE

## 2021-11-09 PROCEDURE — 94799 UNLISTED PULMONARY SVC/PX: CPT

## 2021-11-09 PROCEDURE — 82962 GLUCOSE BLOOD TEST: CPT

## 2021-11-09 PROCEDURE — 99239 HOSP IP/OBS DSCHRG MGMT >30: CPT | Performed by: INTERNAL MEDICINE

## 2021-11-09 RX ORDER — NICOTINE POLACRILEX 4 MG
15 LOZENGE BUCCAL
Start: 2021-11-09 | End: 2022-03-26

## 2021-11-09 RX ORDER — ESCITALOPRAM OXALATE 5 MG/1
5 TABLET ORAL DAILY
Start: 2021-11-10 | End: 2022-03-26

## 2021-11-09 RX ORDER — COLCHICINE 0.6 MG/1
0.6 TABLET ORAL EVERY 12 HOURS SCHEDULED
Qty: 20 TABLET | Refills: 0
Start: 2021-11-09 | End: 2021-11-19

## 2021-11-09 RX ORDER — METOPROLOL SUCCINATE 25 MG/1
25 TABLET, EXTENDED RELEASE ORAL
Start: 2021-11-10 | End: 2022-03-26

## 2021-11-09 RX ORDER — FAMOTIDINE 20 MG/1
20 TABLET, FILM COATED ORAL NIGHTLY
Start: 2021-11-09 | End: 2022-03-26

## 2021-11-09 RX ORDER — PSEUDOEPHEDRINE HCL 30 MG
100 TABLET ORAL 2 TIMES DAILY
Start: 2021-11-09 | End: 2021-12-22 | Stop reason: HOSPADM

## 2021-11-09 RX ORDER — ERGOCALCIFEROL 1.25 MG/1
50000 CAPSULE ORAL WEEKLY
Qty: 5 CAPSULE | Refills: 0 | OUTPATIENT
Start: 2021-11-09

## 2021-11-09 RX ORDER — GABAPENTIN 100 MG/1
100 CAPSULE ORAL 3 TIMES DAILY
Qty: 90 CAPSULE | Refills: 0 | Status: SHIPPED | OUTPATIENT
Start: 2021-11-09 | End: 2021-12-22 | Stop reason: HOSPADM

## 2021-11-09 RX ORDER — POLYETHYLENE GLYCOL 3350 17 G/17G
17 POWDER, FOR SOLUTION ORAL DAILY
Start: 2021-11-10 | End: 2021-12-22 | Stop reason: HOSPADM

## 2021-11-09 RX ORDER — ALLOPURINOL 100 MG/1
200 TABLET ORAL DAILY
Start: 2021-11-09 | End: 2022-03-26 | Stop reason: HOSPADM

## 2021-11-09 RX ORDER — GABAPENTIN 100 MG/1
100 CAPSULE ORAL 3 TIMES DAILY
Start: 2021-11-09 | End: 2021-11-09 | Stop reason: SDUPTHER

## 2021-11-09 RX ADMIN — GABAPENTIN 100 MG: 100 CAPSULE ORAL at 08:43

## 2021-11-09 RX ADMIN — ISOSORBIDE MONONITRATE 30 MG: 30 TABLET ORAL at 06:16

## 2021-11-09 RX ADMIN — SODIUM CHLORIDE, PRESERVATIVE FREE 10 ML: 5 INJECTION INTRAVENOUS at 08:45

## 2021-11-09 RX ADMIN — ESCITALOPRAM OXALATE 5 MG: 10 TABLET ORAL at 08:43

## 2021-11-09 RX ADMIN — HYDRALAZINE HYDROCHLORIDE 50 MG: 50 TABLET, FILM COATED ORAL at 08:44

## 2021-11-09 RX ADMIN — MINERAL OIL, PETROLATUM: 425; 568 OINTMENT OPHTHALMIC at 08:44

## 2021-11-09 RX ADMIN — ASPIRIN 81 MG: 81 TABLET, COATED ORAL at 08:43

## 2021-11-09 RX ADMIN — COLCHICINE 0.6 MG: 0.6 TABLET, FILM COATED ORAL at 08:43

## 2021-11-09 RX ADMIN — INSULIN LISPRO 7 UNITS: 100 INJECTION, SOLUTION INTRAVENOUS; SUBCUTANEOUS at 11:31

## 2021-11-09 RX ADMIN — METOPROLOL SUCCINATE 25 MG: 25 TABLET, EXTENDED RELEASE ORAL at 08:43

## 2021-11-09 RX ADMIN — INSULIN DETEMIR 30 UNITS: 100 INJECTION, SOLUTION SUBCUTANEOUS at 08:48

## 2021-11-09 RX ADMIN — HEPARIN SODIUM 5000 UNITS: 5000 INJECTION, SOLUTION INTRAVENOUS; SUBCUTANEOUS at 05:36

## 2021-11-09 RX ADMIN — INSULIN LISPRO 2 UNITS: 100 INJECTION, SOLUTION INTRAVENOUS; SUBCUTANEOUS at 08:55

## 2021-11-09 RX ADMIN — INSULIN LISPRO 4 UNITS: 100 INJECTION, SOLUTION INTRAVENOUS; SUBCUTANEOUS at 11:31

## 2021-11-09 RX ADMIN — INSULIN LISPRO 7 UNITS: 100 INJECTION, SOLUTION INTRAVENOUS; SUBCUTANEOUS at 08:48

## 2021-11-09 NOTE — NURSING NOTE
Talked with Saira at [facility] about transport, it will be awhile before able to pick pt us due to other calls prior, will wait for ems

## 2021-11-09 NOTE — PLAN OF CARE
Goal Outcome Evaluation:  Plan of Care Reviewed With: patient        Progress: improving  Outcome Summary: patient rested well this shift, alert and oriented

## 2021-11-09 NOTE — PROGRESS NOTES
[facility]     Progress Note    Patient Name: Betty Givens  : 1935  MRN: [id number]  Primary Care Physician:  Mauro Cuellar MD  Date of admission: 10/28/2021    Subjective   Subjective       patient is more alert and oriented.  No chest pains  Overall very weak  Review of Systems  Review of Systems   Constitutional: Negative for appetite change, fatigue and fever.   HENT: Negative for congestion.    Respiratory: Negative for apnea, choking, chest tightness, shortness of breath and wheezing.    Cardiovascular: Negative for chest pain, palpitations and leg swelling.   Gastrointestinal: Negative for diarrhea, nausea and vomiting.   Genitourinary: Negative for dysuria, frequency and hematuria.   Musculoskeletal: Positive for arthralgias. Negative for myalgias.   Skin: Negative for pallor.   Neurological: Negative for dizziness, tremors, syncope and weakness.   Psychiatric/Behavioral: Negative for agitation and confusion.       Objective   Objective     Vitals:   Temp:  [97.3 °F (36.3 °C)-98.1 °F (36.7 °C)] 97.9 °F (36.6 °C)  Heart Rate:  [75-94] 94  Resp:  [16-22] 22  BP: (124-142)/(49-82) 140/82    Physical Exam    Constitutional: Awake, alert   Eyes: PERRLA, sclerae anicteric, no conjunctival injection   HENT: NCAT, mucous membranes moist   Neck: Supple, no thyromegaly, no lymphadenopathy, trachea midline   Respiratory: Clear to auscultation bilaterally, nonlabored respirations    Cardiovascular: RRR, no murmurs, rubs, or gallops, palpable pedal pulses bilaterally   Gastrointestinal: Positive bowel sounds, soft, nontender, nondistended   Musculoskeletal: No bilateral ankle edema, no clubbing or cyanosis to extremities   Psychiatric: Appropriate affect, cooperative   Neurologic: Oriented x 3, strength symmetric in all extremities, Cranial Nerves grossly intact to confrontation, speech clear   Skin: No rashes       Result Review      Result Review:  I have personally reviewed the results from the  time of this admission to 11/03/21 7:34 PM EDT and agree with these findings:  [x]  Laboratory  [x]  Microbiology  [x]  Radiology  [x]  EKG/Telemetry   []  Cardiology/Vascular   []  Pathology  []  Old records  []  Other:        Assessment/Plan   Assessment / Plan     Active Hospital Problems:  Active Hospital Problems    Diagnosis    • Acute congestive heart failure (HCC)    Acute on chronic combined heart failure  Diabetes mellitus uncontrolled  Chronic kidney disease stage IIIb  Hypertension  Depression    Plan  Continue present medications  PT OT for ambulation    DVT prophylaxis:  No DVT prophylaxis order currently exists.      Time spent including face to face service excluding procedures: 20 minutes          Electronically signed by Beck Carbajal MD, 11/09/21, 7:34 PM EDT.

## 2021-11-09 NOTE — NURSING NOTE
Called [facility] and talked with jhonathan rn, report called and all questions answered, explained that pt was going to them with thomas, need to do voiding trial and d/c thomas per their protocol, no other needs voiced at this time

## 2021-11-09 NOTE — TELEPHONE ENCOUNTER
This is for you to send over narc, I had to put Vitamin D on there so it would come across as a prescription refill. Can you delete that one?  
weakness

## 2021-11-09 NOTE — THERAPY TREATMENT NOTE
Acute Care - Physical Therapy Treatment Note  SHARON Due[facility]as     Patient Name: Betty Givens  : 1935  MRN: [id number]  Today's Date: 2021      Visit Dx:     ICD-10-CM ICD-9-CM   1. Acute congestive heart failure, unspecified heart failure type (HCC)  I50.9 428.0   2. B12 deficiency  E53.8 266.2   3. Decreased activities of daily living (ADL)  Z78.9 V49.89   4. Difficulty in walking  R26.2 719.7     Patient Active Problem List   Diagnosis   • Essential hypertension   • Mixed hyperlipidemia   • Non-pressure chronic ulcer of other part of left foot with unspecified severity (HCC)   • Ulcer of foot due to type 2 diabetes mellitus (HCC)   • Type 2 diabetes mellitus without complication, with long-term current use of insulin (HCC)   • Stage 3b chronic kidney disease (formerly [facility])   • Atherosclerosis of native coronary artery of native heart without angina pectoris   • Chronic combined systolic (congestive) and diastolic (congestive) heart failure (HCC)   • Primary osteoarthritis involving multiple joints   • Iron deficiency anemia secondary to inadequate dietary iron intake   • Other fatigue   • Acute idiopathic gout of right hand   • Acute congestive heart failure (HCC)     Past Medical History:   Diagnosis Date   • Anasarca    • Anemia, unspecified    • Atherosclerotic heart disease of native coronary artery without angina pectoris    • Chronic diastolic (congestive) heart failure (HCC)    • Chronic kidney disease, stage 3 (formerly [facility])     moderate   • DM (diabetes mellitus) (formerly [facility])    • Essential (primary) hypertension    • Family history of colonic polyps    • Hypercholesterolemia    • Mixed hyperlipidemia    • Non-pressure chronic ulcer of other part of left foot with unspecified severity (HCC)    • Pure hypercholesterolemia    • Tachycardia    • Type 2 diabetes mellitus with diabetic chronic kidney disease (HCC)     without complications; with foot ulcer   • Unspecified osteoarthritis, unspecified site      Past  Surgical History:   Procedure Laterality Date   • CATARACT EXTRACTION Left    • CHOLECYSTECTOMY     • EYE SURGERY Right    • SKIN CANCER DESTRUCTION  06/2019;6/2018    removal from hand; forehead      PT Assessment (last 12 hours)     PT Evaluation and Treatment     Row Name 11/09/21 0942          Physical Therapy Time and Intention    Subjective Information no complaints (P)   -AL     Document Type therapy note (daily note) (P)   -AL     Mode of Treatment individual therapy; physical therapy (P)   -AL     Patient Effort adequate (P)   -AL     Symptoms Noted During/After Treatment none (P)   -AL     Row Name 11/09/21 0942          Bed Mobility    Bed Mobility -- (P)   Pt declined bed mob/transfers  -AL     Row Name 11/09/21 0942          Motor Skills    Motor Skills therapeutic exercise (P)   -AL     Therapeutic Exercise hip; knee; ankle (P)   -AL     Row Name 11/09/21 0942          Hip (Therapeutic Exercise)    Hip (Therapeutic Exercise) isometric exercises; strengthening exercise (P)   -AL     Hip Isometrics (Therapeutic Exercise) bilateral; gluteal sets; 2 sets; 10 repetitions; 3 second hold (P)   -AL     Hip Strengthening (Therapeutic Exercise) bilateral; flexion; extension; aBduction; aDduction; heel slides; supine; 2 sets; 10 repetitions (P)   -AL     Row Name 11/09/21 0942          Knee (Therapeutic Exercise)    Knee (Therapeutic Exercise) strengthening exercise (P)   -AL     Knee Strengthening (Therapeutic Exercise) bilateral; SAQ (short arc quad); supine; 2 sets; 10 repetitions (P)   -AL     Row Name 11/09/21 0942          Ankle (Therapeutic Exercise)    Ankle (Therapeutic Exercise) AROM (active range of motion) (P)   -AL     Ankle AROM (Therapeutic Exercise) bilateral; dorsiflexion; plantarflexion; 2 sets; 10 repetitions (P)   -AL     Row Name             Wound 10/29/21 1413 Bilateral medial gluteal Pressure Injury    Wound - Properties Group Placement Date: 10/29/21  -KT Placement Time: 1413 -KT Present  on Hospital Admission: --  -KT, Per sister, it was present before admission  Side: Bilateral  -KT Orientation: medial  -KT Location: gluteal  -KT Primary Wound Type: Pressure inj  -KT     Retired Wound - Properties Group Date first assessed: 10/29/21  -KT Time first assessed: 1413  -KT Present on Hospital Admission: --  -KT, Per sister, it was present before admission  Side: Bilateral  -KT Location: gluteal  -KT Primary Wound Type: Pressure inj  -KT     Row Name             Wound 10/29/21 1417 Left upper back Other (comment)    Wound - Properties Group Placement Date: 10/29/21  -KT Placement Time: 1417  -KT Present on Hospital Admission: Y  -KT Side: Left  -KT Orientation: upper  -KT Location: back  -KT Primary Wound Type: Other  -KT, surgery incision      Retired Wound - Properties Group Date first assessed: 10/29/21  -KT Time first assessed: 1417  -KT Present on Hospital Admission: Y  -KT Side: Left  -KT Location: back  -KT Primary Wound Type: Other  -KT, surgery incision      Row Name 11/09/21 0942          Progress Summary (PT)    Progress Toward Functional Goals (PT) progress toward functional goals is fair (P)   -AL     Daily Progress Summary (PT) Pt declined bed mob/transfers but was agreeable to supine exercise. Pt will continue to benefit from skilled therapy as he progresses towards his functional goals. (P)   -AL           User Key  (r) = Recorded By, (t) = Taken By, (c) = Cosigned By    Initials Name Provider Type    Hamida David RNA Registered Nurse    Faraz Clement, PT Student PT Student              Physical Therapy Education                 Title: PT OT SLP Therapies (Done)     Topic: Physical Therapy (Done)     Point: Mobility training (Done)     Learning Progress Summary           Patient Acceptance, E,TB, VU,NR by ALINA at 11/5/2021 0154    Acceptance, E,TB, VU by AC at 11/2/2021 1516                   Point: Home exercise program (Done)     Learning Progress Summary           Patient  Acceptance, E,TB, VU,NR by ALINA at 11/5/2021 0154    Acceptance, E,TB, VU by AC at 11/2/2021 1516                   Point: Body mechanics (Done)     Learning Progress Summary           Patient Acceptance, E,TB, VU,NR by DG at 11/5/2021 0154    Acceptance, E,TB, VU by AC at 11/2/2021 1516                   Point: Precautions (Done)     Learning Progress Summary           Patient Acceptance, E,TB, VU,NR by DG at 11/5/2021 0154    Acceptance, E,TB, VU by AC at 11/2/2021 1516                               User Key     Initials Effective Dates Name Provider Type Discipline    AC 06/03/21 -  Nigel Mccloud PT Physical Therapist PT     07/15/21 -  Loli Posada, RN Registered Nurse Nurse              PT Recommendation and Plan     Progress Summary (PT)  Progress Toward Functional Goals (PT): (P) progress toward functional goals is fair  Daily Progress Summary (PT): (P) Pt declined bed mob/transfers but was agreeable to supine exercise. Pt will continue to benefit from skilled therapy as he progresses towards his functional goals.   Outcome Measures     Row Name 11/09/21 0945 11/06/21 1200          How much help from another person do you currently need...    Turning from your back to your side while in flat bed without using bedrails? 2 (P)   -AL 2  -CS     Moving from lying on back to sitting on the side of a flat bed without bedrails? 2 (P)   -AL 2  -CS     Moving to and from a bed to a chair (including a wheelchair)? 2 (P)   -AL 3  -CS     Standing up from a chair using your arms (e.g., wheelchair, bedside chair)? 3 (P)   -AL 3  -CS     Climbing 3-5 steps with a railing? 2 (P)   -AL 2  -CS     To walk in hospital room? 3 (P)   -AL 3  -CS     AM-PAC 6 Clicks Score (PT) 14 (P)   -AL 15  -CS            Functional Assessment    Outcome Measure Options AM-PAC 6 Clicks Basic Mobility (PT) (P)   -AL AM-PAC 6 Clicks Basic Mobility (PT)  -CS           User Key  (r) = Recorded By, (t) = Taken By, (c) = Cosigned By     Initials Name Provider Type    Torey Silva, PTA Physical Therapy Assistant    Faraz Clement, PT Student PT Student                 Time Calculation:    PT Charges     Row Name 11/09/21 0947             Time Calculation    PT Received On 11/09/21 (P)   -AL      PT Goal Re-Cert Due Date 11/11/21 (P)   -AL              Timed Charges    81609 - PT Therapeutic Exercise Minutes 10 (P)   -AL              Total Minutes    Timed Charges Total Minutes 10 (P)   -AL       Total Minutes 10 (P)   -AL            User Key  (r) = Recorded By, (t) = Taken By, (c) = Cosigned By    Initials Name Provider Type    Faraz Clement, PT Student PT Student              Therapy Charges for Today     Code Description Service Date Service Provider Modifiers Qty    [id number] HC PT THER PROC EA 15 MIN 11/9/2021 Faraz Waddell, PT Student GP 1          PT G-Codes  Outcome Measure Options: (P) AM-PAC 6 Clicks Basic Mobility (PT)  AM-PAC 6 Clicks Score (PT): (P) 14  AM-PAC 6 Clicks Score (OT): 16    Faraz Waddell PT Student  11/9/2021

## 2021-11-09 NOTE — DISCHARGE SUMMARY
[facility]         DISCHARGE SUMMARY    Patient Name: Betty Givens  : 1935  MRN: [id number]    Date of Admission: 10/28/2021  Date of Discharge:     Primary Care Physician: Mauro Cuellar MD    Consults     Date and Time Order Name Status Description    2021  8:18 AM Inpatient Cardiology Consult      10/28/2021  7:07 PM IP General Consult (Use specialty-specific consult if known) Completed           Presenting Problem:   B12 deficiency [E53.8]  Acute congestive heart failure, unspecified heart failure type (HCC) [I50.9]    Active and Resolved Hospital Problems:  Active Hospital Problems    Diagnosis POA   • Acute congestive heart failure (HCC) [I50.9] Yes      Resolved Hospital Problems   No resolved problems to display.         Hospital Course     Hospital Course:    1.  Acute on chronic combined heart failure.  As noted patient with gradual onset of his symptoms, and his BNP is markedly elevated.  Of note his GFR is not far off of his baseline if at all.  So certainly agree with aggressive IV diuretics as written for.  Additionally we will continue his ACE inhibitor and beta-blocker as his blood pressure allows.  We will get Dr. Carbajal involved if not improving as expected...his renal function is bumped as of 10/31, so we will hold further doses of Lasix today and repeat labs in AM...Echo with EF of only 30 to 35% with no severe valvular abnormalities.  His lisinopril was not renewed on admission, so we will resume that now along with a lower dose of IV Lasix as of ...I consulted with Dr. Carbajal this a.m. as of  in regards to further medication titration, in regards to possibly changing his beta-blocker and/or trying Entresto--->  resuming home dose p.o. Lasix at time of transfer to nursing home.     2.  Coronary artery disease with possible type II MI.  Patient without any report of any chest pain per se, but he does have mildly elevated enzymes.  We will  keep him on the telemetry monitor as a precaution.  Continue with his beta-blocker and his Imdur as an antianginal regimen as well...still no chest pain, but as is obvious, patient is bedridden.     3.  Diabetes mellitus uncontrolled.  Patient is on 40 units of Lantus as well as moderate dose Metformin, moderate dose Actos, and near max dose of glimepiride at home.  Obviously will go with just insulin in the hospital, he will probably need aggressive dosing...need to titrate insulin as of 11/1 and will follow up blood sugars in a few days--->  continue with insulin as is in hospital, and will try resuming his Metformin as well, will need follow-up BMP in regards to creatinine though.    4.  Chronic kidney disease stage 3b-4.  We will monitor this closely on diuretics obviously...As noted, a little worse today after an improvement yesterday, so we will hold Lasix and follow-up in the a.m. 11/1...Okay to resume Lasix this evening at a decreased dose as of 11/1 and will follow up labs in the a.m 11/3--->  creatinine of 1.8 at time of discharge is nearing his baseline.     5.  Hypertension.  Continue meds as blood pressure allows---> resuming ACE inhibitor at time of discharge, monitor potassium, advance as needed based on blood pressure.    6.  Iron deficiency anemia as well as anemia related to chronic kidney disease.  Hemoglobin of 11 is made about a gram below his baseline.  Hopefully this is dilutional  Will monitor---> patient received IV Venofer x5 this admission and his hemoglobin is much improved, 12.5, at time of discharge.    7.  Gait dysfunction.  Apparently the patient is nearly bedridden at home, barely able to help family with transfers to a wheelchair.  They and the patient report this is due to the significant bilateral foot pain, likely diabetic neuropathy.  We will start gabapentin, and check uric acid given his history of gout and their concerns for this...Patient is in need of NHP, and although he  is hesitant to go to rehab, it is basically his only option, he is a rather large man, he is essentially bedridden, and his elderly family can no longer care for him, so we are awaiting nursing home placement as of 11/4---> he has a bed as of 11/9.     8.    Acute gouty arthritis.  His foot pain is diffuse, and there is no localized erythema, so is obviously no related to out.  However his uric acid did come back extremely elevated, so I will try him on a few days of colchicine to see if that helps his symptoms some---> continue colchicine as of 11/4 for several more days before initiating allopurinol---> starting low-dose allopurinol at time of transfer, will overlap with colchicine for 7 to 10 days.     9. Depression.  Family reports that the patient threatened to put a bag over his head and suffocate himself when he was in rehab at [facility].  Apparently he felt severely neglected.  Given his present status, he may very well end up back in the nursing home, we will start SSRI at this time.     10.  DVT prophylaxis.  Via subQ heparin given his chronic kidney disease.     11. PUD prophylaxis.  Via H2 blocker.    Day of Discharge     Vital Signs:  Temp:  [97.3 °F (36.3 °C)-98.1 °F (36.7 °C)] 97.6 °F (36.4 °C)  Heart Rate:  [75-88] 84  Resp:  [16-20] 16  BP: (124-149)/(49-78) 140/67                   Physical Exam  Constitutional:       General: He is not in acute distress.     Appearance: He is obese. He is ill-appearing. He is not toxic-appearing.      Comments: Chronically ill-appearing.   HENT:      Head: Atraumatic.      Comments: Profoundly hard of hearing.     Right Ear: External ear normal.      Left Ear: External ear normal.      Nose: Nose normal.      Mouth/Throat:      Mouth: Mucous membranes are moist.   Eyes:      General:         Right eye: No discharge.         Left eye: No discharge.      Extraocular Movements: Extraocular movements intact.      Pupils: Pupils are equal, round, and  reactive to light.   Cardiovascular:      Rate and Rhythm: Normal rate and regular rhythm.      Pulses: Normal pulses.      Heart sounds: Normal heart sounds. No murmur heard.  No gallop.       Comments: No concerning ectopy, no S3.  Pulmonary:      Effort: Pulmonary effort is normal. No respiratory distress.      Breath sounds: No wheezing, rhonchi or rales.      Comments: Diminished breath sounds, but not labored and certainly much clearer than when admitted.  Abdominal:      General: There is no distension.      Palpations: Abdomen is soft. There is no mass.      Tenderness: There is no abdominal tenderness. There is no guarding.   Musculoskeletal:         General: No swelling or tenderness.      Cervical back: No tenderness.      Right lower leg: Edema present.      Left lower leg: Edema present.      Comments: Improving edema.  Skin:     General: Skin is warm and dry.      Findings: No rash.   Neurological:      General: No focal deficit present.      Mental Status: He is alert and oriented to person, place, and time. Mental status is at baseline.      Motor: Weakness present.      Gait: Gait abnormal.      Comments: Patient just globally weak.   Psychiatric:         Mood and Affect: Mood normal.      Comments: No agitation, just hard of hearing and therefore difficult to communicate with.     Pertinent  and/or Most Recent Results     LAB RESULTS:      Lab 11/08/21  0547 11/06/21  0745 11/04/21  0650   WBC 4.30 6.32 6.69   HEMOGLOBIN 12.5* 12.3* 11.8*   HEMATOCRIT 41.3 39.9 37.1*   PLATELETS 361 425 409   NEUTROS ABS 2.14 3.66 3.81   IMMATURE GRANS (ABS) 0.06* 0.08* 0.12*   LYMPHS ABS 1.05 1.57 1.56   MONOS ABS 0.45 0.51 0.65   EOS ABS 0.54* 0.42* 0.49*   MCV 90.0 90.5 89.0         Lab 11/08/21  0547 11/07/21  0604 11/06/21  0745 11/05/21  0602 11/04/21  0650   SODIUM 139 139 139 133* 134*   POTASSIUM 4.5 4.3 4.3 4.2 4.3   CHLORIDE 106 103 103 98 98   CO2 22.8 24.5 23.8 21.4* 24.2   ANION GAP 10.2 11.5 12.2  13.6 11.8   BUN 52* 61* 71* 80* 81*   CREATININE 1.78* 1.85* 2.12* 2.74* 2.87*   GLUCOSE 161* 119* 102* 138* 137*   CALCIUM 8.9 9.0 9.0 8.9 8.6   MAGNESIUM 2.4  --  2.5*  --   --          Lab 11/08/21  0547 11/06/21  0745   TOTAL PROTEIN 6.7 7.1   ALBUMIN 3.20* 3.30*   GLOBULIN 3.5 3.8   ALT (SGPT) 42* 38   AST (SGOT) 100* 108*   BILIRUBIN 0.3 0.2   ALK PHOS 110 97         Lab 11/08/21  0547 11/04/21  0650   PROBNP 9,511.0* 9,915.0*                 Brief Urine Lab Results  (Last result in the past 365 days)      Color   Clarity   Blood   Leuk Est   Nitrite   Protein   CREAT   Urine HCG        10/30/21 1706 Yellow   Clear   Negative   Negative   Negative   Negative               Microbiology Results (last 10 days)     Procedure Component Value - Date/Time    Legionella Antigen, Urine - Urine, Urine, Clean Catch [[id number]]  (Normal) Collected: 10/30/21 1706    Lab Status: Final result Specimen: Urine, Clean Catch Updated: 10/30/21 1800     LEGIONELLA ANTIGEN, URINE Negative    S. Pneumo Ag Urine or CSF - Urine, Urine, Clean Catch [[id number]]  (Normal) Collected: 10/30/21 1706    Lab Status: Final result Specimen: Urine, Clean Catch Updated: 10/30/21 1800     Strep Pneumo Ag Negative                       Results for orders placed during the hospital encounter of 10/28/21    Adult Transthoracic Echo Complete W/ Cont if Necessary Per Protocol    Interpretation Summary  · Estimated right ventricular systolic pressure from tricuspid regurgitation is normal (<35 mmHg). Calculated right ventricular systolic pressure from tricuspid regurgitation is 21 mmHg.  · The left ventricular cavity is borderline dilated.  · Left ventricular wall thickness is consistent with eccentric hypertrophy.  · Estimated left ventricular EF was in disagreement with the calculated left ventricular EF. Left ventricular ejection fraction appears to be 31 - 35%.  · Left ventricular diastolic function is consistent with (grade I) impaired  relaxation.  · There is calcification of the aortic valve.      Labs Pending at Discharge:        Discharge Details        Discharge Medications      New Medications      Instructions Start Date   allopurinol 100 MG tablet  Commonly known as: Zyloprim   200 mg, Oral, Daily      artificial tears ophthalmic ointment   1 application, Left Eye, 2 Times Daily      colchicine 0.6 MG tablet   0.6 mg, Oral, Every 12 Hours Scheduled      dextrose 40 % gel  Commonly known as: GLUTOSE   15 g, Oral, Every 15 Minutes PRN      docusate sodium 100 MG capsule   100 mg, Oral, 2 Times Daily      escitalopram 5 MG tablet  Commonly known as: LEXAPRO   5 mg, Oral, Daily   Start Date: November 10, 2021     famotidine 20 MG tablet  Commonly known as: PEPCID   20 mg, Oral, Nightly      gabapentin 100 MG capsule  Commonly known as: NEURONTIN   100 mg, Oral, 3 Times Daily      glucagon (human recombinant) 1 MG injection  Commonly known as: GLUCAGEN DIAGNOSTIC   1 mg, Subcutaneous, Every 15 Minutes PRN      insulin lispro 100 UNIT/ML injection  Commonly known as: humaLOG   0-9 Units, Subcutaneous, 3 Times Daily Before Meals      insulin lispro 100 UNIT/ML injection  Commonly known as: humaLOG   7 Units, Subcutaneous, 3 Times Daily With Meals      metoprolol succinate XL 25 MG 24 hr tablet  Commonly known as: TOPROL-XL   25 mg, Oral, Every 24 Hours Scheduled   Start Date: November 10, 2021     polyethylene glycol 17 g packet  Commonly known as: MIRALAX   17 g, Oral, Daily   Start Date: November 10, 2021        Changes to Medications      Instructions Start Date   Lantus SoloStar 100 UNIT/ML injection pen  Generic drug: Insulin Glargine  What changed: See the new instructions.   inject 40units SUBCUTANEOUSLY ONCE EVERY DAY AS directed         Continue These Medications      Instructions Start Date   aspirin 81 MG EC tablet   81 mg, Oral, Daily      Easy Touch Lancets 32G/Twist misc   USE TO USE TO TEST BLOOD SUGAR BLOOD SUGAR TWICE DAILY       FeroSul 325 (65 FE) MG tablet  Generic drug: ferrous sulfate   TAKE ONE TABLET BY MOUTH EVERY DAY      furosemide 40 MG tablet  Commonly known as: LASIX   TAKE ONE TABLET BY MOUTH TWICE DAILY      hydrALAZINE 50 MG tablet  Commonly known as: APRESOLINE   TAKE ONE TABLET BY MOUTH TWICE DAILY      isosorbide mononitrate 30 MG 24 hr tablet  Commonly known as: IMDUR   TAKE ONE TABLET BY MOUTH EVERY MORNING      lisinopril 10 MG tablet  Commonly known as: PRINIVIL,ZESTRIL   10 mg, Oral, Every 24 Hours      metFORMIN  MG 24 hr tablet  Commonly known as: GLUCOPHAGE-XR   500 mg, Oral, 2 Times Daily      pravastatin 40 MG tablet  Commonly known as: PRAVACHOL   TAKE ONE TABLET BY MOUTH EVERY DAY      vitamin D 1.25 MG (05856 UT) capsule capsule  Commonly known as: ERGOCALCIFEROL   50,000 Units, Oral, Weekly         Stop These Medications    glimepiride 2 MG tablet  Commonly known as: AMARYL     glucose blood test strip     metoprolol tartrate 100 MG tablet  Commonly known as: LOPRESSOR     NIFEdipine XL 60 MG 24 hr tablet  Commonly known as: PROCARDIA XL     pioglitazone 30 MG tablet  Commonly known as: ACTOS            Allergies   Allergen Reactions   • Crestor [Rosuvastatin] Myalgia         Discharge Disposition:  Rehab Facility or Unit (DC - External)    Diet:  Diet Instructions     Diet: Consistent Carbohydrate, Cardiac      Discharge Diet:  Consistent Carbohydrate  Cardiac       1800 ADA            Discharge Activity:   Activity Instructions     Gradually Increase Activity Until at Pre-Hospitalization Level              CODE STATUS:  Code Status and Medical Interventions:   Ordered at: 10/28/21 2127     Level Of Support Discussed With:    Patient     Code Status (Patient has no pulse and is not breathing):    CPR (Attempt to Resuscitate)     Medical Interventions (Patient has pulse or is breathing):    Full         No future appointments.    Additional Instructions for the Follow-ups that You Need to Schedule      Discharge Follow-up with PCP   As directed       Currently Documented PCP:    Mauro Cuellar MD    PCP Phone Number:    [phone]     Follow Up Details: Dr Cuellar 10 days after d/c home.               Time spent on Discharge including face to face service:  40 minutes    Electronically signed by Mauro Cuellar MD, 11/09/21, 10:28 AM EST.

## 2021-11-09 NOTE — PROGRESS NOTES
[facility]     Progress Note    Patient Name: Betty Givens  : 1935  MRN: [id number]  Primary Care Physician:  Mauro Cuellar MD  Date of admission: 10/28/2021    Subjective   Subjective       patient is more alert and oriented.  His heart rate finally has come up to 70 to 80 bpm  No chest pains  Overall very weak  Review of Systems  Review of Systems   Constitutional: Negative for appetite change, fatigue and fever.   HENT: Negative for congestion.    Respiratory: Negative for apnea, choking, chest tightness, shortness of breath and wheezing.    Cardiovascular: Negative for chest pain, palpitations and leg swelling.   Gastrointestinal: Negative for diarrhea, nausea and vomiting.   Genitourinary: Negative for dysuria, frequency and hematuria.   Musculoskeletal: Positive for arthralgias. Negative for myalgias.   Skin: Negative for pallor.   Neurological: Negative for dizziness, tremors, syncope and weakness.   Psychiatric/Behavioral: Negative for agitation and confusion.       Objective   Objective     Vitals:   Temp:  [97.1 °F (36.2 °C)-98 °F (36.7 °C)] 98 °F (36.7 °C)  Heart Rate:  [53-98] 83  Resp:  [18-20] 18  BP: (123-153)/(58-81) 149/75    Physical Exam    Constitutional: Awake, alert   Eyes: PERRLA, sclerae anicteric, no conjunctival injection   HENT: NCAT, mucous membranes moist   Neck: Supple, no thyromegaly, no lymphadenopathy, trachea midline   Respiratory: Clear to auscultation bilaterally, nonlabored respirations    Cardiovascular: RRR, no murmurs, rubs, or gallops, palpable pedal pulses bilaterally   Gastrointestinal: Positive bowel sounds, soft, nontender, nondistended   Musculoskeletal: No bilateral ankle edema, no clubbing or cyanosis to extremities   Psychiatric: Appropriate affect, cooperative   Neurologic: Oriented x 3, strength symmetric in all extremities, Cranial Nerves grossly intact to confrontation, speech clear   Skin: No rashes       Result Review      Result Review:  I  have personally reviewed the results from the time of this admission to 11/03/21 7:34 PM EDT and agree with these findings:  [x]  Laboratory  [x]  Microbiology  [x]  Radiology  [x]  EKG/Telemetry   []  Cardiology/Vascular   []  Pathology  []  Old records  []  Other:        Assessment/Plan   Assessment / Plan     Active Hospital Problems:  Active Hospital Problems    Diagnosis    • Acute congestive heart failure (HCC)    Acute on chronic combined heart failure  Diabetes mellitus uncontrolled  Chronic kidney disease stage IIIb  Hypertension  Depression    Plan  Continue present medications  PT OT for ambulation  May need rehab  DVT prophylaxis:  Medical DVT prophylaxis orders are present.      Time spent including face to face service excluding procedures: 20 minutes          Electronically signed by Beck Carbajal MD, 11/08/21, 7:34 PM EDT.

## 2021-11-18 RX ORDER — PRAVASTATIN SODIUM 40 MG
TABLET ORAL
Qty: 90 TABLET | Refills: 0 | OUTPATIENT
Start: 2021-11-18

## 2021-12-14 ENCOUNTER — HOSPITAL ENCOUNTER (INPATIENT)
Facility: HOSPITAL | Age: 86
LOS: 8 days | Discharge: NURSING, REHAB OR LONG-TERM CARE | End: 2021-12-22
Attending: EMERGENCY MEDICINE | Admitting: INTERNAL MEDICINE

## 2021-12-14 ENCOUNTER — APPOINTMENT (OUTPATIENT)
Dept: GENERAL RADIOLOGY | Facility: HOSPITAL | Age: 86
End: 2021-12-14

## 2021-12-14 DIAGNOSIS — Z78.9 DECREASED ACTIVITIES OF DAILY LIVING (ADL): ICD-10-CM

## 2021-12-14 DIAGNOSIS — R26.2 DIFFICULTY WALKING: ICD-10-CM

## 2021-12-14 DIAGNOSIS — N17.9 ACUTE RENAL FAILURE, UNSPECIFIED ACUTE RENAL FAILURE TYPE: ICD-10-CM

## 2021-12-14 DIAGNOSIS — E86.0 DEHYDRATION: Primary | ICD-10-CM

## 2021-12-14 LAB
ALBUMIN SERPL-MCNC: 3.1 G/DL (ref 3.5–5.2)
ALBUMIN/GLOB SERPL: 0.8 G/DL
ALP SERPL-CCNC: 99 U/L (ref 39–117)
ALT SERPL W P-5'-P-CCNC: 6 U/L (ref 1–41)
ANION GAP SERPL CALCULATED.3IONS-SCNC: 18.1 MMOL/L (ref 5–15)
AST SERPL-CCNC: 14 U/L (ref 1–40)
BACTERIA UR QL AUTO: ABNORMAL /HPF
BASOPHILS # BLD AUTO: 0.06 10*3/MM3 (ref 0–0.2)
BASOPHILS NFR BLD AUTO: 0.6 % (ref 0–1.5)
BILIRUB SERPL-MCNC: 0.3 MG/DL (ref 0–1.2)
BILIRUB UR QL STRIP: NEGATIVE
BUN SERPL-MCNC: 110 MG/DL (ref 8–23)
BUN/CREAT SERPL: 18.3 (ref 7–25)
CALCIUM SPEC-SCNC: 8.8 MG/DL (ref 8.6–10.5)
CHLORIDE SERPL-SCNC: 101 MMOL/L (ref 98–107)
CLARITY UR: ABNORMAL
CO2 SERPL-SCNC: 17.9 MMOL/L (ref 22–29)
COLOR UR: YELLOW
CREAT SERPL-MCNC: 6.01 MG/DL (ref 0.76–1.27)
DEPRECATED RDW RBC AUTO: 60.5 FL (ref 37–54)
EOSINOPHIL # BLD AUTO: 0.44 10*3/MM3 (ref 0–0.4)
EOSINOPHIL NFR BLD AUTO: 4.1 % (ref 0.3–6.2)
ERYTHROCYTE [DISTWIDTH] IN BLOOD BY AUTOMATED COUNT: 18.6 % (ref 12.3–15.4)
GFR SERPL CREATININE-BSD FRML MDRD: 9 ML/MIN/1.73
GFR SERPL CREATININE-BSD FRML MDRD: ABNORMAL ML/MIN/{1.73_M2}
GLOBULIN UR ELPH-MCNC: 3.9 GM/DL
GLUCOSE BLDC GLUCOMTR-MCNC: 160 MG/DL (ref 70–99)
GLUCOSE SERPL-MCNC: 96 MG/DL (ref 65–99)
GLUCOSE UR STRIP-MCNC: NEGATIVE MG/DL
HCT VFR BLD AUTO: 38 % (ref 37.5–51)
HGB BLD-MCNC: 11.9 G/DL (ref 13–17.7)
HGB UR QL STRIP.AUTO: ABNORMAL
HYALINE CASTS UR QL AUTO: ABNORMAL /LPF
IMM GRANULOCYTES # BLD AUTO: 0.05 10*3/MM3 (ref 0–0.05)
IMM GRANULOCYTES NFR BLD AUTO: 0.5 % (ref 0–0.5)
KETONES UR QL STRIP: NEGATIVE
LEUKOCYTE ESTERASE UR QL STRIP.AUTO: ABNORMAL
LYMPHOCYTES # BLD AUTO: 1.41 10*3/MM3 (ref 0.7–3.1)
LYMPHOCYTES NFR BLD AUTO: 13.1 % (ref 19.6–45.3)
MCH RBC QN AUTO: 28.1 PG (ref 26.6–33)
MCHC RBC AUTO-ENTMCNC: 31.3 G/DL (ref 31.5–35.7)
MCV RBC AUTO: 89.8 FL (ref 79–97)
MONOCYTES # BLD AUTO: 0.89 10*3/MM3 (ref 0.1–0.9)
MONOCYTES NFR BLD AUTO: 8.3 % (ref 5–12)
NEUTROPHILS NFR BLD AUTO: 7.93 10*3/MM3 (ref 1.7–7)
NEUTROPHILS NFR BLD AUTO: 73.4 % (ref 42.7–76)
NITRITE UR QL STRIP: POSITIVE
NRBC BLD AUTO-RTO: 0 /100 WBC (ref 0–0.2)
PH UR STRIP.AUTO: 5.5 [PH] (ref 5–8)
PLATELET # BLD AUTO: 300 10*3/MM3 (ref 140–450)
PMV BLD AUTO: 8.8 FL (ref 6–12)
POTASSIUM SERPL-SCNC: 3.9 MMOL/L (ref 3.5–5.2)
PROT SERPL-MCNC: 7 G/DL (ref 6–8.5)
PROT UR QL STRIP: ABNORMAL
QT INTERVAL: 415 MS
RBC # BLD AUTO: 4.23 10*6/MM3 (ref 4.14–5.8)
RBC # UR STRIP: ABNORMAL /HPF
REF LAB TEST METHOD: ABNORMAL
SODIUM SERPL-SCNC: 137 MMOL/L (ref 136–145)
SP GR UR STRIP: >=1.03 (ref 1–1.03)
SQUAMOUS #/AREA URNS HPF: ABNORMAL /HPF
UROBILINOGEN UR QL STRIP: ABNORMAL
WBC # UR STRIP: ABNORMAL /HPF
WBC NRBC COR # BLD: 10.78 10*3/MM3 (ref 3.4–10.8)
YEAST URNS QL MICRO: ABNORMAL /HPF

## 2021-12-14 PROCEDURE — 63710000001 INSULIN LISPRO (HUMAN) PER 5 UNITS: Performed by: INTERNAL MEDICINE

## 2021-12-14 PROCEDURE — 85025 COMPLETE CBC W/AUTO DIFF WBC: CPT

## 2021-12-14 PROCEDURE — 99284 EMERGENCY DEPT VISIT MOD MDM: CPT

## 2021-12-14 PROCEDURE — 71045 X-RAY EXAM CHEST 1 VIEW: CPT

## 2021-12-14 PROCEDURE — 99223 1ST HOSP IP/OBS HIGH 75: CPT | Performed by: INTERNAL MEDICINE

## 2021-12-14 PROCEDURE — 82962 GLUCOSE BLOOD TEST: CPT

## 2021-12-14 PROCEDURE — 80053 COMPREHEN METABOLIC PANEL: CPT

## 2021-12-14 PROCEDURE — 25010000002 HEPARIN (PORCINE) PER 1000 UNITS: Performed by: INTERNAL MEDICINE

## 2021-12-14 PROCEDURE — 93005 ELECTROCARDIOGRAM TRACING: CPT | Performed by: EMERGENCY MEDICINE

## 2021-12-14 PROCEDURE — 81001 URINALYSIS AUTO W/SCOPE: CPT

## 2021-12-14 PROCEDURE — 93010 ELECTROCARDIOGRAM REPORT: CPT | Performed by: INTERNAL MEDICINE

## 2021-12-14 PROCEDURE — 36415 COLL VENOUS BLD VENIPUNCTURE: CPT

## 2021-12-14 RX ORDER — ACETAMINOPHEN 650 MG/1
650 SUPPOSITORY RECTAL EVERY 4 HOURS PRN
Status: DISCONTINUED | OUTPATIENT
Start: 2021-12-14 | End: 2021-12-22 | Stop reason: HOSPADM

## 2021-12-14 RX ORDER — CHOLECALCIFEROL (VITAMIN D3) 125 MCG
5 CAPSULE ORAL NIGHTLY PRN
Status: DISCONTINUED | OUTPATIENT
Start: 2021-12-14 | End: 2021-12-22 | Stop reason: HOSPADM

## 2021-12-14 RX ORDER — GABAPENTIN 100 MG/1
100 CAPSULE ORAL 3 TIMES DAILY
Status: DISCONTINUED | OUTPATIENT
Start: 2021-12-14 | End: 2021-12-22 | Stop reason: HOSPADM

## 2021-12-14 RX ORDER — SODIUM CHLORIDE 0.9 % (FLUSH) 0.9 %
10 SYRINGE (ML) INJECTION EVERY 12 HOURS SCHEDULED
Status: DISCONTINUED | OUTPATIENT
Start: 2021-12-14 | End: 2021-12-22 | Stop reason: HOSPADM

## 2021-12-14 RX ORDER — POLYETHYLENE GLYCOL 3350 17 G/17G
17 POWDER, FOR SOLUTION ORAL DAILY PRN
Status: DISCONTINUED | OUTPATIENT
Start: 2021-12-14 | End: 2021-12-22 | Stop reason: HOSPADM

## 2021-12-14 RX ORDER — NICOTINE POLACRILEX 4 MG
15 LOZENGE BUCCAL
Status: DISCONTINUED | OUTPATIENT
Start: 2021-12-14 | End: 2021-12-22 | Stop reason: HOSPADM

## 2021-12-14 RX ORDER — CALCIUM CARBONATE 200(500)MG
2 TABLET,CHEWABLE ORAL 3 TIMES DAILY PRN
Status: DISCONTINUED | OUTPATIENT
Start: 2021-12-14 | End: 2021-12-22 | Stop reason: HOSPADM

## 2021-12-14 RX ORDER — DOCUSATE SODIUM 100 MG/1
100 CAPSULE, LIQUID FILLED ORAL 2 TIMES DAILY
Status: DISCONTINUED | OUTPATIENT
Start: 2021-12-14 | End: 2021-12-18

## 2021-12-14 RX ORDER — ISOSORBIDE MONONITRATE 30 MG/1
30 TABLET, EXTENDED RELEASE ORAL EVERY MORNING
Status: DISCONTINUED | OUTPATIENT
Start: 2021-12-15 | End: 2021-12-15

## 2021-12-14 RX ORDER — SODIUM CHLORIDE 9 MG/ML
50 INJECTION, SOLUTION INTRAVENOUS CONTINUOUS
Status: DISCONTINUED | OUTPATIENT
Start: 2021-12-14 | End: 2021-12-18

## 2021-12-14 RX ORDER — ALUMINA, MAGNESIA, AND SIMETHICONE 2400; 2400; 240 MG/30ML; MG/30ML; MG/30ML
15 SUSPENSION ORAL EVERY 6 HOURS PRN
Status: DISCONTINUED | OUTPATIENT
Start: 2021-12-14 | End: 2021-12-22 | Stop reason: HOSPADM

## 2021-12-14 RX ORDER — ONDANSETRON 2 MG/ML
4 INJECTION INTRAMUSCULAR; INTRAVENOUS EVERY 4 HOURS PRN
Status: DISCONTINUED | OUTPATIENT
Start: 2021-12-14 | End: 2021-12-22 | Stop reason: HOSPADM

## 2021-12-14 RX ORDER — FAMOTIDINE 20 MG/1
20 TABLET, FILM COATED ORAL NIGHTLY
Status: DISCONTINUED | OUTPATIENT
Start: 2021-12-14 | End: 2021-12-22 | Stop reason: HOSPADM

## 2021-12-14 RX ORDER — DONEPEZIL HYDROCHLORIDE 5 MG/1
5 TABLET, FILM COATED ORAL NIGHTLY
COMMUNITY
End: 2021-12-22 | Stop reason: HOSPADM

## 2021-12-14 RX ORDER — ASPIRIN 81 MG/1
81 TABLET ORAL DAILY
Status: DISCONTINUED | OUTPATIENT
Start: 2021-12-14 | End: 2021-12-22 | Stop reason: HOSPADM

## 2021-12-14 RX ORDER — ACETAMINOPHEN 325 MG/1
650 TABLET ORAL EVERY 6 HOURS PRN
COMMUNITY
End: 2021-12-22 | Stop reason: HOSPADM

## 2021-12-14 RX ORDER — DONEPEZIL HYDROCHLORIDE 5 MG/1
5 TABLET, FILM COATED ORAL NIGHTLY
Status: DISCONTINUED | OUTPATIENT
Start: 2021-12-14 | End: 2021-12-22 | Stop reason: HOSPADM

## 2021-12-14 RX ORDER — ESCITALOPRAM OXALATE 10 MG/1
5 TABLET ORAL DAILY
Status: DISCONTINUED | OUTPATIENT
Start: 2021-12-14 | End: 2021-12-22 | Stop reason: HOSPADM

## 2021-12-14 RX ORDER — BISACODYL 10 MG
10 SUPPOSITORY, RECTAL RECTAL DAILY PRN
Status: DISCONTINUED | OUTPATIENT
Start: 2021-12-14 | End: 2021-12-22 | Stop reason: HOSPADM

## 2021-12-14 RX ORDER — ACETAMINOPHEN 325 MG/1
650 TABLET ORAL EVERY 4 HOURS PRN
Status: DISCONTINUED | OUTPATIENT
Start: 2021-12-14 | End: 2021-12-22 | Stop reason: HOSPADM

## 2021-12-14 RX ORDER — POLYETHYLENE GLYCOL 3350 17 G/17G
17 POWDER, FOR SOLUTION ORAL DAILY
Status: DISCONTINUED | OUTPATIENT
Start: 2021-12-14 | End: 2021-12-18

## 2021-12-14 RX ORDER — SODIUM CHLORIDE 0.9 % (FLUSH) 0.9 %
10 SYRINGE (ML) INJECTION AS NEEDED
Status: DISCONTINUED | OUTPATIENT
Start: 2021-12-14 | End: 2021-12-22 | Stop reason: HOSPADM

## 2021-12-14 RX ORDER — DEXTROSE MONOHYDRATE 100 MG/ML
25 INJECTION, SOLUTION INTRAVENOUS
Status: DISCONTINUED | OUTPATIENT
Start: 2021-12-14 | End: 2021-12-22 | Stop reason: HOSPADM

## 2021-12-14 RX ORDER — BISACODYL 5 MG/1
5 TABLET, DELAYED RELEASE ORAL DAILY PRN
Status: DISCONTINUED | OUTPATIENT
Start: 2021-12-14 | End: 2021-12-22 | Stop reason: HOSPADM

## 2021-12-14 RX ORDER — HEPARIN SODIUM 5000 [USP'U]/ML
5000 INJECTION, SOLUTION INTRAVENOUS; SUBCUTANEOUS EVERY 12 HOURS SCHEDULED
Status: DISCONTINUED | OUTPATIENT
Start: 2021-12-14 | End: 2021-12-15

## 2021-12-14 RX ORDER — AMOXICILLIN 250 MG
1 CAPSULE ORAL 2 TIMES DAILY PRN
Status: DISCONTINUED | OUTPATIENT
Start: 2021-12-14 | End: 2021-12-22 | Stop reason: HOSPADM

## 2021-12-14 RX ORDER — ACETAMINOPHEN 160 MG/5ML
650 SOLUTION ORAL EVERY 4 HOURS PRN
Status: DISCONTINUED | OUTPATIENT
Start: 2021-12-14 | End: 2021-12-22 | Stop reason: HOSPADM

## 2021-12-14 RX ADMIN — SODIUM CHLORIDE 1000 ML: 9 INJECTION, SOLUTION INTRAVENOUS at 13:22

## 2021-12-14 RX ADMIN — DONEPEZIL HYDROCHLORIDE 5 MG: 5 TABLET ORAL at 21:23

## 2021-12-14 RX ADMIN — FAMOTIDINE 20 MG: 20 TABLET ORAL at 21:22

## 2021-12-14 RX ADMIN — SODIUM CHLORIDE, PRESERVATIVE FREE 10 ML: 5 INJECTION INTRAVENOUS at 21:23

## 2021-12-14 RX ADMIN — INSULIN LISPRO 3 UNITS: 100 INJECTION, SOLUTION INTRAVENOUS; SUBCUTANEOUS at 21:36

## 2021-12-14 RX ADMIN — SODIUM CHLORIDE 2000 ML: 9 INJECTION, SOLUTION INTRAVENOUS at 13:38

## 2021-12-14 RX ADMIN — DOCUSATE SODIUM 100 MG: 100 CAPSULE, LIQUID FILLED ORAL at 21:22

## 2021-12-14 RX ADMIN — SODIUM CHLORIDE 100 ML/HR: 9 INJECTION, SOLUTION INTRAVENOUS at 22:35

## 2021-12-14 RX ADMIN — METOPROLOL TARTRATE 25 MG: 25 TABLET, FILM COATED ORAL at 21:22

## 2021-12-14 RX ADMIN — HEPARIN SODIUM 5000 UNITS: 5000 INJECTION, SOLUTION INTRAVENOUS; SUBCUTANEOUS at 21:23

## 2021-12-14 RX ADMIN — GABAPENTIN 100 MG: 100 CAPSULE ORAL at 21:22

## 2021-12-15 LAB
ANION GAP SERPL CALCULATED.3IONS-SCNC: 16.4 MMOL/L (ref 5–15)
ANION GAP SERPL CALCULATED.3IONS-SCNC: 16.7 MMOL/L (ref 5–15)
APTT PPP: 22.5 SECONDS (ref 22.2–34.2)
BASOPHILS # BLD AUTO: 0.05 10*3/MM3 (ref 0–0.2)
BASOPHILS # BLD AUTO: 0.05 10*3/MM3 (ref 0–0.2)
BASOPHILS NFR BLD AUTO: 0.6 % (ref 0–1.5)
BASOPHILS NFR BLD AUTO: 0.7 % (ref 0–1.5)
BUN SERPL-MCNC: 107 MG/DL (ref 8–23)
BUN SERPL-MCNC: 108 MG/DL (ref 8–23)
BUN/CREAT SERPL: 20.4 (ref 7–25)
BUN/CREAT SERPL: 21.8 (ref 7–25)
CALCIUM SPEC-SCNC: 8.6 MG/DL (ref 8.6–10.5)
CALCIUM SPEC-SCNC: 8.6 MG/DL (ref 8.6–10.5)
CHLORIDE SERPL-SCNC: 106 MMOL/L (ref 98–107)
CHLORIDE SERPL-SCNC: 107 MMOL/L (ref 98–107)
CO2 SERPL-SCNC: 10.3 MMOL/L (ref 22–29)
CO2 SERPL-SCNC: 12.6 MMOL/L (ref 22–29)
CREAT SERPL-MCNC: 4.96 MG/DL (ref 0.76–1.27)
CREAT SERPL-MCNC: 5.25 MG/DL (ref 0.76–1.27)
DEPRECATED RDW RBC AUTO: 58.4 FL (ref 37–54)
DEPRECATED RDW RBC AUTO: 62.9 FL (ref 37–54)
EOSINOPHIL # BLD AUTO: 0.34 10*3/MM3 (ref 0–0.4)
EOSINOPHIL # BLD AUTO: 0.42 10*3/MM3 (ref 0–0.4)
EOSINOPHIL NFR BLD AUTO: 4.6 % (ref 0.3–6.2)
EOSINOPHIL NFR BLD AUTO: 5 % (ref 0.3–6.2)
ERYTHROCYTE [DISTWIDTH] IN BLOOD BY AUTOMATED COUNT: 18.5 % (ref 12.3–15.4)
ERYTHROCYTE [DISTWIDTH] IN BLOOD BY AUTOMATED COUNT: 18.6 % (ref 12.3–15.4)
GFR SERPL CREATININE-BSD FRML MDRD: 10 ML/MIN/1.73
GFR SERPL CREATININE-BSD FRML MDRD: 11 ML/MIN/1.73
GFR SERPL CREATININE-BSD FRML MDRD: ABNORMAL ML/MIN/{1.73_M2}
GFR SERPL CREATININE-BSD FRML MDRD: ABNORMAL ML/MIN/{1.73_M2}
GLUCOSE BLDC GLUCOMTR-MCNC: 103 MG/DL (ref 70–99)
GLUCOSE BLDC GLUCOMTR-MCNC: 49 MG/DL (ref 70–99)
GLUCOSE BLDC GLUCOMTR-MCNC: 51 MG/DL (ref 70–99)
GLUCOSE BLDC GLUCOMTR-MCNC: 74 MG/DL (ref 70–99)
GLUCOSE BLDC GLUCOMTR-MCNC: 92 MG/DL (ref 70–99)
GLUCOSE BLDC GLUCOMTR-MCNC: 98 MG/DL (ref 70–99)
GLUCOSE BLDC GLUCOMTR-MCNC: 99 MG/DL (ref 70–99)
GLUCOSE SERPL-MCNC: 63 MG/DL (ref 65–99)
GLUCOSE SERPL-MCNC: 87 MG/DL (ref 65–99)
HCT VFR BLD AUTO: 34.4 % (ref 37.5–51)
HCT VFR BLD AUTO: 37.4 % (ref 37.5–51)
HGB BLD-MCNC: 11.2 G/DL (ref 13–17.7)
HGB BLD-MCNC: 11.3 G/DL (ref 13–17.7)
IMM GRANULOCYTES # BLD AUTO: 0.04 10*3/MM3 (ref 0–0.05)
IMM GRANULOCYTES # BLD AUTO: 0.06 10*3/MM3 (ref 0–0.05)
IMM GRANULOCYTES NFR BLD AUTO: 0.6 % (ref 0–0.5)
IMM GRANULOCYTES NFR BLD AUTO: 0.7 % (ref 0–0.5)
INR PPP: 0.94 (ref 2–3)
LYMPHOCYTES # BLD AUTO: 0.87 10*3/MM3 (ref 0.7–3.1)
LYMPHOCYTES # BLD AUTO: 1.18 10*3/MM3 (ref 0.7–3.1)
LYMPHOCYTES NFR BLD AUTO: 12.8 % (ref 19.6–45.3)
LYMPHOCYTES NFR BLD AUTO: 13 % (ref 19.6–45.3)
MCH RBC QN AUTO: 28 PG (ref 26.6–33)
MCH RBC QN AUTO: 28.2 PG (ref 26.6–33)
MCHC RBC AUTO-ENTMCNC: 30.2 G/DL (ref 31.5–35.7)
MCHC RBC AUTO-ENTMCNC: 32.6 G/DL (ref 31.5–35.7)
MCV RBC AUTO: 86.6 FL (ref 79–97)
MCV RBC AUTO: 92.6 FL (ref 79–97)
MONOCYTES # BLD AUTO: 0.51 10*3/MM3 (ref 0.1–0.9)
MONOCYTES # BLD AUTO: 0.67 10*3/MM3 (ref 0.1–0.9)
MONOCYTES NFR BLD AUTO: 7.4 % (ref 5–12)
MONOCYTES NFR BLD AUTO: 7.5 % (ref 5–12)
NEUTROPHILS NFR BLD AUTO: 4.98 10*3/MM3 (ref 1.7–7)
NEUTROPHILS NFR BLD AUTO: 6.68 10*3/MM3 (ref 1.7–7)
NEUTROPHILS NFR BLD AUTO: 73.4 % (ref 42.7–76)
NEUTROPHILS NFR BLD AUTO: 73.7 % (ref 42.7–76)
NRBC BLD AUTO-RTO: 0 /100 WBC (ref 0–0.2)
NRBC BLD AUTO-RTO: 0 /100 WBC (ref 0–0.2)
PLAT MORPH BLD: NORMAL
PLATELET # BLD AUTO: 216 10*3/MM3 (ref 140–450)
PLATELET # BLD AUTO: 246 10*3/MM3 (ref 140–450)
PMV BLD AUTO: 10.1 FL (ref 6–12)
PMV BLD AUTO: 9.5 FL (ref 6–12)
POTASSIUM SERPL-SCNC: 4.1 MMOL/L (ref 3.5–5.2)
POTASSIUM SERPL-SCNC: 4.9 MMOL/L (ref 3.5–5.2)
PROTHROMBIN TIME: 10 SECONDS (ref 9.4–12)
RBC # BLD AUTO: 3.97 10*6/MM3 (ref 4.14–5.8)
RBC # BLD AUTO: 4.04 10*6/MM3 (ref 4.14–5.8)
RBC MORPH BLD: NORMAL
SODIUM SERPL-SCNC: 134 MMOL/L (ref 136–145)
SODIUM SERPL-SCNC: 135 MMOL/L (ref 136–145)
TROPONIN T SERPL-MCNC: 3.32 NG/ML (ref 0–0.03)
WBC MORPH BLD: NORMAL
WBC NRBC COR # BLD: 6.79 10*3/MM3 (ref 3.4–10.8)
WBC NRBC COR # BLD: 9.06 10*3/MM3 (ref 3.4–10.8)

## 2021-12-15 PROCEDURE — 82962 GLUCOSE BLOOD TEST: CPT

## 2021-12-15 PROCEDURE — 25010000002 HEPARIN (PORCINE) PER 1000 UNITS: Performed by: INTERNAL MEDICINE

## 2021-12-15 PROCEDURE — 85730 THROMBOPLASTIN TIME PARTIAL: CPT | Performed by: INTERNAL MEDICINE

## 2021-12-15 PROCEDURE — 99233 SBSQ HOSP IP/OBS HIGH 50: CPT | Performed by: INTERNAL MEDICINE

## 2021-12-15 PROCEDURE — 85610 PROTHROMBIN TIME: CPT | Performed by: INTERNAL MEDICINE

## 2021-12-15 PROCEDURE — 97165 OT EVAL LOW COMPLEX 30 MIN: CPT

## 2021-12-15 PROCEDURE — 80048 BASIC METABOLIC PNL TOTAL CA: CPT | Performed by: INTERNAL MEDICINE

## 2021-12-15 PROCEDURE — 85007 BL SMEAR W/DIFF WBC COUNT: CPT | Performed by: INTERNAL MEDICINE

## 2021-12-15 PROCEDURE — 85025 COMPLETE CBC W/AUTO DIFF WBC: CPT | Performed by: INTERNAL MEDICINE

## 2021-12-15 PROCEDURE — 97161 PT EVAL LOW COMPLEX 20 MIN: CPT

## 2021-12-15 PROCEDURE — 84484 ASSAY OF TROPONIN QUANT: CPT | Performed by: INTERNAL MEDICINE

## 2021-12-15 RX ORDER — HEPARIN SODIUM 10000 [USP'U]/100ML
10.5 INJECTION, SOLUTION INTRAVENOUS
Status: DISCONTINUED | OUTPATIENT
Start: 2021-12-15 | End: 2021-12-16

## 2021-12-15 RX ADMIN — NITROGLYCERIN 0.5 INCH: 20 OINTMENT TOPICAL at 15:18

## 2021-12-15 RX ADMIN — HEPARIN SODIUM 10.5 UNITS/KG/HR: 10000 INJECTION, SOLUTION INTRAVENOUS at 15:45

## 2021-12-15 RX ADMIN — DONEPEZIL HYDROCHLORIDE 5 MG: 5 TABLET ORAL at 20:19

## 2021-12-15 RX ADMIN — SODIUM CHLORIDE 100 ML/HR: 9 INJECTION, SOLUTION INTRAVENOUS at 19:51

## 2021-12-15 RX ADMIN — MICONAZOLE NITRATE: 2 POWDER TOPICAL at 20:21

## 2021-12-15 RX ADMIN — MICONAZOLE NITRATE: 2 POWDER TOPICAL at 14:30

## 2021-12-15 RX ADMIN — GABAPENTIN 100 MG: 100 CAPSULE ORAL at 18:00

## 2021-12-15 RX ADMIN — METOPROLOL TARTRATE 12.5 MG: 25 TABLET, FILM COATED ORAL at 20:19

## 2021-12-15 RX ADMIN — GABAPENTIN 100 MG: 100 CAPSULE ORAL at 20:19

## 2021-12-15 RX ADMIN — ISOSORBIDE MONONITRATE 30 MG: 30 TABLET ORAL at 09:27

## 2021-12-15 RX ADMIN — GABAPENTIN 100 MG: 100 CAPSULE ORAL at 09:10

## 2021-12-15 RX ADMIN — FAMOTIDINE 20 MG: 20 TABLET ORAL at 20:19

## 2021-12-15 RX ADMIN — HEPARIN SODIUM 5000 UNITS: 5000 INJECTION, SOLUTION INTRAVENOUS; SUBCUTANEOUS at 09:11

## 2021-12-15 RX ADMIN — SODIUM CHLORIDE 100 ML/HR: 9 INJECTION, SOLUTION INTRAVENOUS at 09:09

## 2021-12-15 RX ADMIN — SODIUM CHLORIDE, PRESERVATIVE FREE 10 ML: 5 INJECTION INTRAVENOUS at 20:22

## 2021-12-15 RX ADMIN — ASPIRIN 81 MG: 81 TABLET, COATED ORAL at 09:10

## 2021-12-15 RX ADMIN — ESCITALOPRAM OXALATE 5 MG: 10 TABLET ORAL at 09:11

## 2021-12-16 LAB
ANION GAP SERPL CALCULATED.3IONS-SCNC: 13.9 MMOL/L (ref 5–15)
APTT PPP: 30.5 SECONDS (ref 22.2–34.2)
APTT PPP: 39.6 SECONDS (ref 22.2–34.2)
APTT PPP: 69.7 SECONDS (ref 22.2–34.2)
BUN SERPL-MCNC: 99 MG/DL (ref 8–23)
BUN/CREAT SERPL: 27.2 (ref 7–25)
CALCIUM SPEC-SCNC: 8.2 MG/DL (ref 8.6–10.5)
CHLORIDE SERPL-SCNC: 109 MMOL/L (ref 98–107)
CO2 SERPL-SCNC: 14.1 MMOL/L (ref 22–29)
CREAT SERPL-MCNC: 3.64 MG/DL (ref 0.76–1.27)
GFR SERPL CREATININE-BSD FRML MDRD: 16 ML/MIN/1.73
GLUCOSE BLDC GLUCOMTR-MCNC: 103 MG/DL (ref 70–99)
GLUCOSE BLDC GLUCOMTR-MCNC: 139 MG/DL (ref 70–99)
GLUCOSE BLDC GLUCOMTR-MCNC: 162 MG/DL (ref 70–99)
GLUCOSE BLDC GLUCOMTR-MCNC: 56 MG/DL (ref 70–99)
GLUCOSE BLDC GLUCOMTR-MCNC: 76 MG/DL (ref 70–99)
GLUCOSE SERPL-MCNC: 58 MG/DL (ref 65–99)
POTASSIUM SERPL-SCNC: 3.7 MMOL/L (ref 3.5–5.2)
QT INTERVAL: 452 MS
SODIUM SERPL-SCNC: 137 MMOL/L (ref 136–145)
TROPONIN T SERPL-MCNC: 2.55 NG/ML (ref 0–0.03)

## 2021-12-16 PROCEDURE — 85730 THROMBOPLASTIN TIME PARTIAL: CPT | Performed by: INTERNAL MEDICINE

## 2021-12-16 PROCEDURE — 84484 ASSAY OF TROPONIN QUANT: CPT | Performed by: INTERNAL MEDICINE

## 2021-12-16 PROCEDURE — 80048 BASIC METABOLIC PNL TOTAL CA: CPT | Performed by: INTERNAL MEDICINE

## 2021-12-16 PROCEDURE — 93005 ELECTROCARDIOGRAM TRACING: CPT | Performed by: INTERNAL MEDICINE

## 2021-12-16 PROCEDURE — 82962 GLUCOSE BLOOD TEST: CPT

## 2021-12-16 PROCEDURE — 99233 SBSQ HOSP IP/OBS HIGH 50: CPT | Performed by: INTERNAL MEDICINE

## 2021-12-16 PROCEDURE — 25010000002 HEPARIN (PORCINE) PER 1000 UNITS: Performed by: INTERNAL MEDICINE

## 2021-12-16 PROCEDURE — 93010 ELECTROCARDIOGRAM REPORT: CPT | Performed by: INTERNAL MEDICINE

## 2021-12-16 PROCEDURE — 63710000001 INSULIN LISPRO (HUMAN) PER 5 UNITS: Performed by: INTERNAL MEDICINE

## 2021-12-16 RX ADMIN — HEPARIN SODIUM 8.5 UNITS/KG/HR: 10000 INJECTION, SOLUTION INTRAVENOUS at 01:22

## 2021-12-16 RX ADMIN — ASPIRIN 81 MG: 81 TABLET, COATED ORAL at 08:19

## 2021-12-16 RX ADMIN — SODIUM CHLORIDE, PRESERVATIVE FREE 10 ML: 5 INJECTION INTRAVENOUS at 08:22

## 2021-12-16 RX ADMIN — DONEPEZIL HYDROCHLORIDE 5 MG: 5 TABLET ORAL at 21:30

## 2021-12-16 RX ADMIN — HEPARIN SODIUM 8.51 UNITS/KG/HR: 10000 INJECTION, SOLUTION INTRAVENOUS at 05:50

## 2021-12-16 RX ADMIN — MICONAZOLE NITRATE: 2 POWDER TOPICAL at 08:22

## 2021-12-16 RX ADMIN — METOPROLOL TARTRATE 12.5 MG: 25 TABLET, FILM COATED ORAL at 21:30

## 2021-12-16 RX ADMIN — INSULIN LISPRO 3 UNITS: 100 INJECTION, SOLUTION INTRAVENOUS; SUBCUTANEOUS at 17:18

## 2021-12-16 RX ADMIN — HEPARIN SODIUM 10.5 UNITS/KG/HR: 10000 INJECTION, SOLUTION INTRAVENOUS at 15:18

## 2021-12-16 RX ADMIN — METOPROLOL TARTRATE 12.5 MG: 25 TABLET, FILM COATED ORAL at 08:19

## 2021-12-16 RX ADMIN — SODIUM CHLORIDE 100 ML/HR: 9 INJECTION, SOLUTION INTRAVENOUS at 16:45

## 2021-12-16 RX ADMIN — GABAPENTIN 100 MG: 100 CAPSULE ORAL at 15:55

## 2021-12-16 RX ADMIN — NITROGLYCERIN 0.5 INCH: 20 OINTMENT TOPICAL at 08:21

## 2021-12-16 RX ADMIN — SODIUM CHLORIDE 100 ML/HR: 9 INJECTION, SOLUTION INTRAVENOUS at 05:50

## 2021-12-16 RX ADMIN — ESCITALOPRAM OXALATE 5 MG: 10 TABLET ORAL at 08:19

## 2021-12-16 RX ADMIN — GABAPENTIN 100 MG: 100 CAPSULE ORAL at 08:19

## 2021-12-16 RX ADMIN — FAMOTIDINE 20 MG: 20 TABLET ORAL at 21:29

## 2021-12-16 RX ADMIN — MICONAZOLE NITRATE: 2 POWDER TOPICAL at 21:30

## 2021-12-16 RX ADMIN — SODIUM CHLORIDE, PRESERVATIVE FREE 10 ML: 5 INJECTION INTRAVENOUS at 21:30

## 2021-12-16 RX ADMIN — GABAPENTIN 100 MG: 100 CAPSULE ORAL at 21:30

## 2021-12-17 LAB
ANION GAP SERPL CALCULATED.3IONS-SCNC: 11.3 MMOL/L (ref 5–15)
BUN SERPL-MCNC: 89 MG/DL (ref 8–23)
BUN/CREAT SERPL: 31.9 (ref 7–25)
CALCIUM SPEC-SCNC: 8.2 MG/DL (ref 8.6–10.5)
CHLORIDE SERPL-SCNC: 110 MMOL/L (ref 98–107)
CO2 SERPL-SCNC: 14.7 MMOL/L (ref 22–29)
CREAT SERPL-MCNC: 2.79 MG/DL (ref 0.76–1.27)
GFR SERPL CREATININE-BSD FRML MDRD: 22 ML/MIN/1.73
GLUCOSE BLDC GLUCOMTR-MCNC: 123 MG/DL (ref 70–99)
GLUCOSE BLDC GLUCOMTR-MCNC: 143 MG/DL (ref 70–99)
GLUCOSE BLDC GLUCOMTR-MCNC: 189 MG/DL (ref 70–99)
GLUCOSE BLDC GLUCOMTR-MCNC: 214 MG/DL (ref 70–99)
GLUCOSE SERPL-MCNC: 134 MG/DL (ref 65–99)
POTASSIUM SERPL-SCNC: 3.6 MMOL/L (ref 3.5–5.2)
SODIUM SERPL-SCNC: 136 MMOL/L (ref 136–145)

## 2021-12-17 PROCEDURE — 82962 GLUCOSE BLOOD TEST: CPT

## 2021-12-17 PROCEDURE — 25010000002 HEPARIN (PORCINE) PER 1000 UNITS: Performed by: INTERNAL MEDICINE

## 2021-12-17 PROCEDURE — 99232 SBSQ HOSP IP/OBS MODERATE 35: CPT | Performed by: INTERNAL MEDICINE

## 2021-12-17 PROCEDURE — 63710000001 INSULIN LISPRO (HUMAN) PER 5 UNITS: Performed by: INTERNAL MEDICINE

## 2021-12-17 PROCEDURE — 80048 BASIC METABOLIC PNL TOTAL CA: CPT | Performed by: INTERNAL MEDICINE

## 2021-12-17 RX ORDER — HEPARIN SODIUM 5000 [USP'U]/ML
5000 INJECTION, SOLUTION INTRAVENOUS; SUBCUTANEOUS EVERY 12 HOURS SCHEDULED
Status: DISCONTINUED | OUTPATIENT
Start: 2021-12-17 | End: 2021-12-22 | Stop reason: HOSPADM

## 2021-12-17 RX ADMIN — SODIUM CHLORIDE 50 ML/HR: 9 INJECTION, SOLUTION INTRAVENOUS at 17:54

## 2021-12-17 RX ADMIN — DONEPEZIL HYDROCHLORIDE 5 MG: 5 TABLET ORAL at 20:44

## 2021-12-17 RX ADMIN — GABAPENTIN 100 MG: 100 CAPSULE ORAL at 08:34

## 2021-12-17 RX ADMIN — METOPROLOL TARTRATE 12.5 MG: 25 TABLET, FILM COATED ORAL at 20:45

## 2021-12-17 RX ADMIN — SODIUM CHLORIDE, PRESERVATIVE FREE 10 ML: 5 INJECTION INTRAVENOUS at 08:35

## 2021-12-17 RX ADMIN — METOPROLOL TARTRATE 12.5 MG: 25 TABLET, FILM COATED ORAL at 08:34

## 2021-12-17 RX ADMIN — ASPIRIN 81 MG: 81 TABLET, COATED ORAL at 08:34

## 2021-12-17 RX ADMIN — ESCITALOPRAM OXALATE 5 MG: 10 TABLET ORAL at 08:34

## 2021-12-17 RX ADMIN — FAMOTIDINE 20 MG: 20 TABLET ORAL at 20:44

## 2021-12-17 RX ADMIN — INSULIN LISPRO 3 UNITS: 100 INJECTION, SOLUTION INTRAVENOUS; SUBCUTANEOUS at 21:01

## 2021-12-17 RX ADMIN — MICONAZOLE NITRATE: 2 POWDER TOPICAL at 08:36

## 2021-12-17 RX ADMIN — HEPARIN SODIUM 5000 UNITS: 5000 INJECTION, SOLUTION INTRAVENOUS; SUBCUTANEOUS at 20:44

## 2021-12-17 RX ADMIN — INSULIN LISPRO 5 UNITS: 100 INJECTION, SOLUTION INTRAVENOUS; SUBCUTANEOUS at 11:38

## 2021-12-17 RX ADMIN — GABAPENTIN 100 MG: 100 CAPSULE ORAL at 16:18

## 2021-12-17 RX ADMIN — GABAPENTIN 100 MG: 100 CAPSULE ORAL at 20:44

## 2021-12-17 RX ADMIN — MICONAZOLE NITRATE: 2 POWDER TOPICAL at 20:44

## 2021-12-18 LAB
027 TOXIN: ABNORMAL
ANION GAP SERPL CALCULATED.3IONS-SCNC: 11.2 MMOL/L (ref 5–15)
BASOPHILS # BLD AUTO: 0.03 10*3/MM3 (ref 0–0.2)
BASOPHILS NFR BLD AUTO: 0.6 % (ref 0–1.5)
BUN SERPL-MCNC: 76 MG/DL (ref 8–23)
BUN/CREAT SERPL: 38.4 (ref 7–25)
C DIFF TOX GENS STL QL NAA+PROBE: POSITIVE
CALCIUM SPEC-SCNC: 8.4 MG/DL (ref 8.6–10.5)
CHLORIDE SERPL-SCNC: 113 MMOL/L (ref 98–107)
CO2 SERPL-SCNC: 15.8 MMOL/L (ref 22–29)
CREAT SERPL-MCNC: 1.98 MG/DL (ref 0.76–1.27)
DEPRECATED RDW RBC AUTO: 62.2 FL (ref 37–54)
EOSINOPHIL # BLD AUTO: 0.29 10*3/MM3 (ref 0–0.4)
EOSINOPHIL NFR BLD AUTO: 6.2 % (ref 0.3–6.2)
ERYTHROCYTE [DISTWIDTH] IN BLOOD BY AUTOMATED COUNT: 19.2 % (ref 12.3–15.4)
GFR SERPL CREATININE-BSD FRML MDRD: 32 ML/MIN/1.73
GLUCOSE BLDC GLUCOMTR-MCNC: 119 MG/DL (ref 70–99)
GLUCOSE BLDC GLUCOMTR-MCNC: 159 MG/DL (ref 70–99)
GLUCOSE BLDC GLUCOMTR-MCNC: 210 MG/DL (ref 70–99)
GLUCOSE BLDC GLUCOMTR-MCNC: 211 MG/DL (ref 70–99)
GLUCOSE SERPL-MCNC: 135 MG/DL (ref 65–99)
HCT VFR BLD AUTO: 31.1 % (ref 37.5–51)
HGB BLD-MCNC: 9.5 G/DL (ref 13–17.7)
IMM GRANULOCYTES # BLD AUTO: 0.08 10*3/MM3 (ref 0–0.05)
IMM GRANULOCYTES NFR BLD AUTO: 1.7 % (ref 0–0.5)
LYMPHOCYTES # BLD AUTO: 0.89 10*3/MM3 (ref 0.7–3.1)
LYMPHOCYTES NFR BLD AUTO: 19.1 % (ref 19.6–45.3)
MCH RBC QN AUTO: 27.3 PG (ref 26.6–33)
MCHC RBC AUTO-ENTMCNC: 30.5 G/DL (ref 31.5–35.7)
MCV RBC AUTO: 89.4 FL (ref 79–97)
MONOCYTES # BLD AUTO: 0.59 10*3/MM3 (ref 0.1–0.9)
MONOCYTES NFR BLD AUTO: 12.7 % (ref 5–12)
NEUTROPHILS NFR BLD AUTO: 2.77 10*3/MM3 (ref 1.7–7)
NEUTROPHILS NFR BLD AUTO: 59.7 % (ref 42.7–76)
NRBC BLD AUTO-RTO: 0 /100 WBC (ref 0–0.2)
PLATELET # BLD AUTO: 226 10*3/MM3 (ref 140–450)
PMV BLD AUTO: 9 FL (ref 6–12)
POTASSIUM SERPL-SCNC: 3.9 MMOL/L (ref 3.5–5.2)
RBC # BLD AUTO: 3.48 10*6/MM3 (ref 4.14–5.8)
SODIUM SERPL-SCNC: 140 MMOL/L (ref 136–145)
WBC NRBC COR # BLD: 4.65 10*3/MM3 (ref 3.4–10.8)

## 2021-12-18 PROCEDURE — 85025 COMPLETE CBC W/AUTO DIFF WBC: CPT | Performed by: INTERNAL MEDICINE

## 2021-12-18 PROCEDURE — 63710000001 INSULIN LISPRO (HUMAN) PER 5 UNITS: Performed by: INTERNAL MEDICINE

## 2021-12-18 PROCEDURE — 87493 C DIFF AMPLIFIED PROBE: CPT | Performed by: INTERNAL MEDICINE

## 2021-12-18 PROCEDURE — 25010000002 HEPARIN (PORCINE) PER 1000 UNITS: Performed by: INTERNAL MEDICINE

## 2021-12-18 PROCEDURE — 82962 GLUCOSE BLOOD TEST: CPT

## 2021-12-18 PROCEDURE — 99232 SBSQ HOSP IP/OBS MODERATE 35: CPT | Performed by: INTERNAL MEDICINE

## 2021-12-18 PROCEDURE — 25010000002 FUROSEMIDE PER 20 MG: Performed by: INTERNAL MEDICINE

## 2021-12-18 PROCEDURE — 80048 BASIC METABOLIC PNL TOTAL CA: CPT | Performed by: INTERNAL MEDICINE

## 2021-12-18 RX ORDER — VANCOMYCIN HYDROCHLORIDE 125 MG/1
125 CAPSULE ORAL EVERY 6 HOURS SCHEDULED
Status: DISCONTINUED | OUTPATIENT
Start: 2021-12-18 | End: 2021-12-22 | Stop reason: HOSPADM

## 2021-12-18 RX ORDER — FUROSEMIDE 10 MG/ML
40 INJECTION INTRAMUSCULAR; INTRAVENOUS ONCE
Status: COMPLETED | OUTPATIENT
Start: 2021-12-18 | End: 2021-12-18

## 2021-12-18 RX ADMIN — INSULIN LISPRO 3 UNITS: 100 INJECTION, SOLUTION INTRAVENOUS; SUBCUTANEOUS at 21:01

## 2021-12-18 RX ADMIN — HEPARIN SODIUM 5000 UNITS: 5000 INJECTION, SOLUTION INTRAVENOUS; SUBCUTANEOUS at 09:43

## 2021-12-18 RX ADMIN — INSULIN LISPRO 5 UNITS: 100 INJECTION, SOLUTION INTRAVENOUS; SUBCUTANEOUS at 12:00

## 2021-12-18 RX ADMIN — VANCOMYCIN HYDROCHLORIDE 125 MG: 125 CAPSULE ORAL at 20:21

## 2021-12-18 RX ADMIN — METOPROLOL TARTRATE 12.5 MG: 25 TABLET, FILM COATED ORAL at 20:21

## 2021-12-18 RX ADMIN — SODIUM CHLORIDE, PRESERVATIVE FREE 10 ML: 5 INJECTION INTRAVENOUS at 20:20

## 2021-12-18 RX ADMIN — FAMOTIDINE 20 MG: 20 TABLET ORAL at 20:21

## 2021-12-18 RX ADMIN — MICONAZOLE NITRATE: 2 POWDER TOPICAL at 09:44

## 2021-12-18 RX ADMIN — INSULIN LISPRO 5 UNITS: 100 INJECTION, SOLUTION INTRAVENOUS; SUBCUTANEOUS at 16:55

## 2021-12-18 RX ADMIN — GABAPENTIN 100 MG: 100 CAPSULE ORAL at 20:21

## 2021-12-18 RX ADMIN — METOPROLOL TARTRATE 12.5 MG: 25 TABLET, FILM COATED ORAL at 09:43

## 2021-12-18 RX ADMIN — HEPARIN SODIUM 5000 UNITS: 5000 INJECTION, SOLUTION INTRAVENOUS; SUBCUTANEOUS at 20:20

## 2021-12-18 RX ADMIN — GABAPENTIN 100 MG: 100 CAPSULE ORAL at 16:50

## 2021-12-18 RX ADMIN — FUROSEMIDE 40 MG: 10 INJECTION, SOLUTION INTRAMUSCULAR; INTRAVENOUS at 16:54

## 2021-12-18 RX ADMIN — SODIUM CHLORIDE, PRESERVATIVE FREE 10 ML: 5 INJECTION INTRAVENOUS at 09:45

## 2021-12-18 RX ADMIN — DONEPEZIL HYDROCHLORIDE 5 MG: 5 TABLET ORAL at 20:21

## 2021-12-18 RX ADMIN — GABAPENTIN 100 MG: 100 CAPSULE ORAL at 09:43

## 2021-12-18 RX ADMIN — ASPIRIN 81 MG: 81 TABLET, COATED ORAL at 09:43

## 2021-12-18 RX ADMIN — ESCITALOPRAM OXALATE 5 MG: 10 TABLET ORAL at 09:42

## 2021-12-18 RX ADMIN — MICONAZOLE NITRATE: 2 POWDER TOPICAL at 20:21

## 2021-12-19 LAB
ANION GAP SERPL CALCULATED.3IONS-SCNC: 10 MMOL/L (ref 5–15)
BUN SERPL-MCNC: 67 MG/DL (ref 8–23)
BUN/CREAT SERPL: 43.2 (ref 7–25)
CALCIUM SPEC-SCNC: 9.2 MG/DL (ref 8.6–10.5)
CHLORIDE SERPL-SCNC: 113 MMOL/L (ref 98–107)
CO2 SERPL-SCNC: 16 MMOL/L (ref 22–29)
CREAT SERPL-MCNC: 1.55 MG/DL (ref 0.76–1.27)
GFR SERPL CREATININE-BSD FRML MDRD: 43 ML/MIN/1.73
GLUCOSE BLDC GLUCOMTR-MCNC: 161 MG/DL (ref 70–99)
GLUCOSE BLDC GLUCOMTR-MCNC: 162 MG/DL (ref 70–99)
GLUCOSE BLDC GLUCOMTR-MCNC: 208 MG/DL (ref 70–99)
GLUCOSE BLDC GLUCOMTR-MCNC: 246 MG/DL (ref 70–99)
GLUCOSE SERPL-MCNC: 165 MG/DL (ref 65–99)
IRON 24H UR-MRATE: 38 MCG/DL (ref 59–158)
IRON SATN MFR SERPL: 25 % (ref 20–50)
NT-PROBNP SERPL-MCNC: ABNORMAL PG/ML (ref 0–1800)
POTASSIUM SERPL-SCNC: 4 MMOL/L (ref 3.5–5.2)
SODIUM SERPL-SCNC: 139 MMOL/L (ref 136–145)
TIBC SERPL-MCNC: 153 MCG/DL (ref 298–536)
TRANSFERRIN SERPL-MCNC: 103 MG/DL (ref 200–360)

## 2021-12-19 PROCEDURE — 80048 BASIC METABOLIC PNL TOTAL CA: CPT | Performed by: INTERNAL MEDICINE

## 2021-12-19 PROCEDURE — 84466 ASSAY OF TRANSFERRIN: CPT | Performed by: INTERNAL MEDICINE

## 2021-12-19 PROCEDURE — 25010000002 FUROSEMIDE PER 20 MG: Performed by: INTERNAL MEDICINE

## 2021-12-19 PROCEDURE — 82962 GLUCOSE BLOOD TEST: CPT

## 2021-12-19 PROCEDURE — 83540 ASSAY OF IRON: CPT | Performed by: INTERNAL MEDICINE

## 2021-12-19 PROCEDURE — 63710000001 INSULIN LISPRO (HUMAN) PER 5 UNITS: Performed by: INTERNAL MEDICINE

## 2021-12-19 PROCEDURE — 25010000002 IRON SUCROSE PER 1 MG: Performed by: INTERNAL MEDICINE

## 2021-12-19 PROCEDURE — 83880 ASSAY OF NATRIURETIC PEPTIDE: CPT | Performed by: INTERNAL MEDICINE

## 2021-12-19 PROCEDURE — 25010000002 HEPARIN (PORCINE) PER 1000 UNITS: Performed by: INTERNAL MEDICINE

## 2021-12-19 PROCEDURE — 99233 SBSQ HOSP IP/OBS HIGH 50: CPT | Performed by: INTERNAL MEDICINE

## 2021-12-19 RX ORDER — POTASSIUM CHLORIDE 750 MG/1
10 CAPSULE, EXTENDED RELEASE ORAL
Status: DISCONTINUED | OUTPATIENT
Start: 2021-12-19 | End: 2021-12-22

## 2021-12-19 RX ORDER — FUROSEMIDE 10 MG/ML
60 INJECTION INTRAMUSCULAR; INTRAVENOUS EVERY 8 HOURS
Status: DISCONTINUED | OUTPATIENT
Start: 2021-12-19 | End: 2021-12-19

## 2021-12-19 RX ORDER — TORSEMIDE 20 MG/1
40 TABLET ORAL
Status: DISCONTINUED | OUTPATIENT
Start: 2021-12-19 | End: 2021-12-22

## 2021-12-19 RX ADMIN — GABAPENTIN 100 MG: 100 CAPSULE ORAL at 08:09

## 2021-12-19 RX ADMIN — FUROSEMIDE 60 MG: 10 INJECTION, SOLUTION INTRAMUSCULAR; INTRAVENOUS at 17:57

## 2021-12-19 RX ADMIN — TORSEMIDE 40 MG: 20 TABLET ORAL at 21:05

## 2021-12-19 RX ADMIN — IRON SUCROSE 200 MG: 20 INJECTION, SOLUTION INTRAVENOUS at 17:57

## 2021-12-19 RX ADMIN — SODIUM CHLORIDE, PRESERVATIVE FREE 10 ML: 5 INJECTION INTRAVENOUS at 09:07

## 2021-12-19 RX ADMIN — INSULIN LISPRO 3 UNITS: 100 INJECTION, SOLUTION INTRAVENOUS; SUBCUTANEOUS at 23:05

## 2021-12-19 RX ADMIN — INSULIN LISPRO 5 UNITS: 100 INJECTION, SOLUTION INTRAVENOUS; SUBCUTANEOUS at 11:19

## 2021-12-19 RX ADMIN — VANCOMYCIN HYDROCHLORIDE 125 MG: 125 CAPSULE ORAL at 05:24

## 2021-12-19 RX ADMIN — METOPROLOL TARTRATE 12.5 MG: 25 TABLET, FILM COATED ORAL at 08:11

## 2021-12-19 RX ADMIN — MICONAZOLE NITRATE: 2 POWDER TOPICAL at 21:06

## 2021-12-19 RX ADMIN — METOPROLOL TARTRATE 12.5 MG: 25 TABLET, FILM COATED ORAL at 21:06

## 2021-12-19 RX ADMIN — HEPARIN SODIUM 5000 UNITS: 5000 INJECTION, SOLUTION INTRAVENOUS; SUBCUTANEOUS at 08:12

## 2021-12-19 RX ADMIN — ASPIRIN 81 MG: 81 TABLET, COATED ORAL at 08:12

## 2021-12-19 RX ADMIN — HEPARIN SODIUM 5000 UNITS: 5000 INJECTION, SOLUTION INTRAVENOUS; SUBCUTANEOUS at 21:05

## 2021-12-19 RX ADMIN — GABAPENTIN 100 MG: 100 CAPSULE ORAL at 16:41

## 2021-12-19 RX ADMIN — POTASSIUM CHLORIDE 10 MEQ: 750 CAPSULE, EXTENDED RELEASE ORAL at 17:55

## 2021-12-19 RX ADMIN — GABAPENTIN 100 MG: 100 CAPSULE ORAL at 21:05

## 2021-12-19 RX ADMIN — ESCITALOPRAM OXALATE 5 MG: 10 TABLET ORAL at 08:10

## 2021-12-19 RX ADMIN — VANCOMYCIN HYDROCHLORIDE 125 MG: 125 CAPSULE ORAL at 16:42

## 2021-12-19 RX ADMIN — MICONAZOLE NITRATE: 2 POWDER TOPICAL at 08:11

## 2021-12-19 RX ADMIN — DONEPEZIL HYDROCHLORIDE 5 MG: 5 TABLET ORAL at 21:05

## 2021-12-19 RX ADMIN — FAMOTIDINE 20 MG: 20 TABLET ORAL at 21:05

## 2021-12-19 RX ADMIN — INSULIN LISPRO 5 UNITS: 100 INJECTION, SOLUTION INTRAVENOUS; SUBCUTANEOUS at 17:54

## 2021-12-19 RX ADMIN — VANCOMYCIN HYDROCHLORIDE 125 MG: 125 CAPSULE ORAL at 11:20

## 2021-12-19 RX ADMIN — VANCOMYCIN HYDROCHLORIDE 125 MG: 125 CAPSULE ORAL at 00:26

## 2021-12-20 LAB
ANION GAP SERPL CALCULATED.3IONS-SCNC: 10.5 MMOL/L (ref 5–15)
BUN SERPL-MCNC: 59 MG/DL (ref 8–23)
BUN/CREAT SERPL: 42.8 (ref 7–25)
CALCIUM SPEC-SCNC: 9.2 MG/DL (ref 8.6–10.5)
CHLORIDE SERPL-SCNC: 110 MMOL/L (ref 98–107)
CO2 SERPL-SCNC: 20.5 MMOL/L (ref 22–29)
CREAT SERPL-MCNC: 1.38 MG/DL (ref 0.76–1.27)
GFR SERPL CREATININE-BSD FRML MDRD: 49 ML/MIN/1.73
GLUCOSE BLDC GLUCOMTR-MCNC: 128 MG/DL (ref 70–99)
GLUCOSE BLDC GLUCOMTR-MCNC: 181 MG/DL (ref 70–99)
GLUCOSE BLDC GLUCOMTR-MCNC: 238 MG/DL (ref 70–99)
GLUCOSE BLDC GLUCOMTR-MCNC: 292 MG/DL (ref 70–99)
GLUCOSE SERPL-MCNC: 163 MG/DL (ref 65–99)
POTASSIUM SERPL-SCNC: 3.8 MMOL/L (ref 3.5–5.2)
SODIUM SERPL-SCNC: 141 MMOL/L (ref 136–145)

## 2021-12-20 PROCEDURE — 99233 SBSQ HOSP IP/OBS HIGH 50: CPT | Performed by: INTERNAL MEDICINE

## 2021-12-20 PROCEDURE — 25010000002 HEPARIN (PORCINE) PER 1000 UNITS: Performed by: INTERNAL MEDICINE

## 2021-12-20 PROCEDURE — 63710000001 INSULIN LISPRO (HUMAN) PER 5 UNITS: Performed by: INTERNAL MEDICINE

## 2021-12-20 PROCEDURE — 80048 BASIC METABOLIC PNL TOTAL CA: CPT | Performed by: INTERNAL MEDICINE

## 2021-12-20 PROCEDURE — 82962 GLUCOSE BLOOD TEST: CPT

## 2021-12-20 RX ADMIN — VANCOMYCIN HYDROCHLORIDE 125 MG: 125 CAPSULE ORAL at 17:16

## 2021-12-20 RX ADMIN — ESCITALOPRAM OXALATE 5 MG: 10 TABLET ORAL at 10:02

## 2021-12-20 RX ADMIN — POTASSIUM CHLORIDE 10 MEQ: 750 CAPSULE, EXTENDED RELEASE ORAL at 10:03

## 2021-12-20 RX ADMIN — HEPARIN SODIUM 5000 UNITS: 5000 INJECTION, SOLUTION INTRAVENOUS; SUBCUTANEOUS at 21:29

## 2021-12-20 RX ADMIN — INSULIN LISPRO 4 UNITS: 100 INJECTION, SOLUTION INTRAVENOUS; SUBCUTANEOUS at 21:26

## 2021-12-20 RX ADMIN — SODIUM CHLORIDE, PRESERVATIVE FREE 10 ML: 5 INJECTION INTRAVENOUS at 10:05

## 2021-12-20 RX ADMIN — SODIUM CHLORIDE, PRESERVATIVE FREE 10 ML: 5 INJECTION INTRAVENOUS at 21:31

## 2021-12-20 RX ADMIN — VANCOMYCIN HYDROCHLORIDE 125 MG: 125 CAPSULE ORAL at 12:34

## 2021-12-20 RX ADMIN — TORSEMIDE 40 MG: 20 TABLET ORAL at 17:16

## 2021-12-20 RX ADMIN — FAMOTIDINE 20 MG: 20 TABLET ORAL at 21:28

## 2021-12-20 RX ADMIN — GABAPENTIN 100 MG: 100 CAPSULE ORAL at 21:28

## 2021-12-20 RX ADMIN — MICONAZOLE NITRATE: 2 POWDER TOPICAL at 10:01

## 2021-12-20 RX ADMIN — METOPROLOL TARTRATE 12.5 MG: 25 TABLET, FILM COATED ORAL at 21:28

## 2021-12-20 RX ADMIN — POTASSIUM CHLORIDE 10 MEQ: 750 CAPSULE, EXTENDED RELEASE ORAL at 12:33

## 2021-12-20 RX ADMIN — NITROGLYCERIN 0.5 INCH: 20 OINTMENT TOPICAL at 17:17

## 2021-12-20 RX ADMIN — VANCOMYCIN HYDROCHLORIDE 125 MG: 125 CAPSULE ORAL at 00:05

## 2021-12-20 RX ADMIN — GABAPENTIN 100 MG: 100 CAPSULE ORAL at 10:03

## 2021-12-20 RX ADMIN — NITROGLYCERIN 0.5 INCH: 20 OINTMENT TOPICAL at 10:03

## 2021-12-20 RX ADMIN — ASPIRIN 81 MG: 81 TABLET, COATED ORAL at 10:02

## 2021-12-20 RX ADMIN — VANCOMYCIN HYDROCHLORIDE 125 MG: 125 CAPSULE ORAL at 05:33

## 2021-12-20 RX ADMIN — METOPROLOL TARTRATE 12.5 MG: 25 TABLET, FILM COATED ORAL at 10:03

## 2021-12-20 RX ADMIN — HEPARIN SODIUM 5000 UNITS: 5000 INJECTION, SOLUTION INTRAVENOUS; SUBCUTANEOUS at 10:04

## 2021-12-20 RX ADMIN — INSULIN LISPRO 3 UNITS: 100 INJECTION, SOLUTION INTRAVENOUS; SUBCUTANEOUS at 10:01

## 2021-12-20 RX ADMIN — MICONAZOLE NITRATE: 2 POWDER TOPICAL at 21:26

## 2021-12-20 RX ADMIN — TORSEMIDE 40 MG: 20 TABLET ORAL at 10:02

## 2021-12-20 RX ADMIN — INSULIN LISPRO 8 UNITS: 100 INJECTION, SOLUTION INTRAVENOUS; SUBCUTANEOUS at 12:34

## 2021-12-20 RX ADMIN — DONEPEZIL HYDROCHLORIDE 5 MG: 5 TABLET ORAL at 21:29

## 2021-12-20 RX ADMIN — VANCOMYCIN HYDROCHLORIDE 125 MG: 125 CAPSULE ORAL at 23:43

## 2021-12-20 RX ADMIN — POTASSIUM CHLORIDE 10 MEQ: 750 CAPSULE, EXTENDED RELEASE ORAL at 17:16

## 2021-12-20 RX ADMIN — GABAPENTIN 100 MG: 100 CAPSULE ORAL at 17:16

## 2021-12-21 LAB
ANION GAP SERPL CALCULATED.3IONS-SCNC: 11.2 MMOL/L (ref 5–15)
BASOPHILS # BLD AUTO: 0.06 10*3/MM3 (ref 0–0.2)
BASOPHILS NFR BLD AUTO: 0.9 % (ref 0–1.5)
BUN SERPL-MCNC: 69 MG/DL (ref 8–23)
BUN/CREAT SERPL: 39.2 (ref 7–25)
CALCIUM SPEC-SCNC: 9.4 MG/DL (ref 8.6–10.5)
CHLORIDE SERPL-SCNC: 107 MMOL/L (ref 98–107)
CO2 SERPL-SCNC: 20.8 MMOL/L (ref 22–29)
CREAT SERPL-MCNC: 1.76 MG/DL (ref 0.76–1.27)
DEPRECATED RDW RBC AUTO: 65.3 FL (ref 37–54)
EOSINOPHIL # BLD AUTO: 0.3 10*3/MM3 (ref 0–0.4)
EOSINOPHIL NFR BLD AUTO: 4.4 % (ref 0.3–6.2)
ERYTHROCYTE [DISTWIDTH] IN BLOOD BY AUTOMATED COUNT: 19.9 % (ref 12.3–15.4)
GFR SERPL CREATININE-BSD FRML MDRD: 37 ML/MIN/1.73
GLUCOSE BLDC GLUCOMTR-MCNC: 176 MG/DL (ref 70–99)
GLUCOSE BLDC GLUCOMTR-MCNC: 219 MG/DL (ref 70–99)
GLUCOSE BLDC GLUCOMTR-MCNC: 247 MG/DL (ref 70–99)
GLUCOSE BLDC GLUCOMTR-MCNC: 341 MG/DL (ref 70–99)
GLUCOSE SERPL-MCNC: 178 MG/DL (ref 65–99)
HCT VFR BLD AUTO: 35 % (ref 37.5–51)
HGB BLD-MCNC: 10.6 G/DL (ref 13–17.7)
IMM GRANULOCYTES # BLD AUTO: 0.08 10*3/MM3 (ref 0–0.05)
IMM GRANULOCYTES NFR BLD AUTO: 1.2 % (ref 0–0.5)
LYMPHOCYTES # BLD AUTO: 1.48 10*3/MM3 (ref 0.7–3.1)
LYMPHOCYTES NFR BLD AUTO: 21.6 % (ref 19.6–45.3)
MCH RBC QN AUTO: 27.7 PG (ref 26.6–33)
MCHC RBC AUTO-ENTMCNC: 30.3 G/DL (ref 31.5–35.7)
MCV RBC AUTO: 91.6 FL (ref 79–97)
MONOCYTES # BLD AUTO: 0.51 10*3/MM3 (ref 0.1–0.9)
MONOCYTES NFR BLD AUTO: 7.4 % (ref 5–12)
NEUTROPHILS NFR BLD AUTO: 4.42 10*3/MM3 (ref 1.7–7)
NEUTROPHILS NFR BLD AUTO: 64.5 % (ref 42.7–76)
NRBC BLD AUTO-RTO: 0 /100 WBC (ref 0–0.2)
PLATELET # BLD AUTO: 227 10*3/MM3 (ref 140–450)
PMV BLD AUTO: 9 FL (ref 6–12)
POTASSIUM SERPL-SCNC: 3.9 MMOL/L (ref 3.5–5.2)
RBC # BLD AUTO: 3.82 10*6/MM3 (ref 4.14–5.8)
SARS-COV-2 RNA RESP QL NAA+PROBE: NOT DETECTED
SODIUM SERPL-SCNC: 139 MMOL/L (ref 136–145)
WBC NRBC COR # BLD: 6.85 10*3/MM3 (ref 3.4–10.8)

## 2021-12-21 PROCEDURE — 99232 SBSQ HOSP IP/OBS MODERATE 35: CPT | Performed by: INTERNAL MEDICINE

## 2021-12-21 PROCEDURE — U0004 COV-19 TEST NON-CDC HGH THRU: HCPCS | Performed by: INTERNAL MEDICINE

## 2021-12-21 PROCEDURE — 85025 COMPLETE CBC W/AUTO DIFF WBC: CPT | Performed by: INTERNAL MEDICINE

## 2021-12-21 PROCEDURE — 80048 BASIC METABOLIC PNL TOTAL CA: CPT | Performed by: INTERNAL MEDICINE

## 2021-12-21 PROCEDURE — 63710000001 INSULIN LISPRO (HUMAN) PER 5 UNITS: Performed by: INTERNAL MEDICINE

## 2021-12-21 PROCEDURE — U0005 INFEC AGEN DETEC AMPLI PROBE: HCPCS | Performed by: INTERNAL MEDICINE

## 2021-12-21 PROCEDURE — 82962 GLUCOSE BLOOD TEST: CPT

## 2021-12-21 PROCEDURE — 25010000002 HEPARIN (PORCINE) PER 1000 UNITS: Performed by: INTERNAL MEDICINE

## 2021-12-21 RX ADMIN — TORSEMIDE 40 MG: 20 TABLET ORAL at 08:19

## 2021-12-21 RX ADMIN — GABAPENTIN 100 MG: 100 CAPSULE ORAL at 21:01

## 2021-12-21 RX ADMIN — MICONAZOLE NITRATE: 2 POWDER TOPICAL at 21:01

## 2021-12-21 RX ADMIN — POTASSIUM CHLORIDE 10 MEQ: 750 CAPSULE, EXTENDED RELEASE ORAL at 08:18

## 2021-12-21 RX ADMIN — NITROGLYCERIN 0.5 INCH: 20 OINTMENT TOPICAL at 13:06

## 2021-12-21 RX ADMIN — FAMOTIDINE 20 MG: 20 TABLET ORAL at 21:01

## 2021-12-21 RX ADMIN — TORSEMIDE 40 MG: 20 TABLET ORAL at 17:19

## 2021-12-21 RX ADMIN — GABAPENTIN 100 MG: 100 CAPSULE ORAL at 16:00

## 2021-12-21 RX ADMIN — GABAPENTIN 100 MG: 100 CAPSULE ORAL at 08:17

## 2021-12-21 RX ADMIN — NITROGLYCERIN 0.5 INCH: 20 OINTMENT TOPICAL at 17:19

## 2021-12-21 RX ADMIN — POTASSIUM CHLORIDE 10 MEQ: 750 CAPSULE, EXTENDED RELEASE ORAL at 11:29

## 2021-12-21 RX ADMIN — ASPIRIN 81 MG: 81 TABLET, COATED ORAL at 08:17

## 2021-12-21 RX ADMIN — MICONAZOLE NITRATE: 2 POWDER TOPICAL at 08:18

## 2021-12-21 RX ADMIN — HEPARIN SODIUM 5000 UNITS: 5000 INJECTION, SOLUTION INTRAVENOUS; SUBCUTANEOUS at 08:17

## 2021-12-21 RX ADMIN — INSULIN LISPRO 5 UNITS: 100 INJECTION, SOLUTION INTRAVENOUS; SUBCUTANEOUS at 17:19

## 2021-12-21 RX ADMIN — NITROGLYCERIN 0.5 INCH: 20 OINTMENT TOPICAL at 08:18

## 2021-12-21 RX ADMIN — VANCOMYCIN HYDROCHLORIDE 125 MG: 125 CAPSULE ORAL at 05:21

## 2021-12-21 RX ADMIN — INSULIN LISPRO 5 UNITS: 100 INJECTION, SOLUTION INTRAVENOUS; SUBCUTANEOUS at 21:33

## 2021-12-21 RX ADMIN — INSULIN LISPRO 3 UNITS: 100 INJECTION, SOLUTION INTRAVENOUS; SUBCUTANEOUS at 08:18

## 2021-12-21 RX ADMIN — POTASSIUM CHLORIDE 10 MEQ: 750 CAPSULE, EXTENDED RELEASE ORAL at 17:19

## 2021-12-21 RX ADMIN — VANCOMYCIN HYDROCHLORIDE 125 MG: 125 CAPSULE ORAL at 11:29

## 2021-12-21 RX ADMIN — ESCITALOPRAM OXALATE 5 MG: 10 TABLET ORAL at 08:17

## 2021-12-21 RX ADMIN — METOPROLOL TARTRATE 12.5 MG: 25 TABLET, FILM COATED ORAL at 21:02

## 2021-12-21 RX ADMIN — HEPARIN SODIUM 5000 UNITS: 5000 INJECTION, SOLUTION INTRAVENOUS; SUBCUTANEOUS at 21:01

## 2021-12-21 RX ADMIN — METOPROLOL TARTRATE 12.5 MG: 25 TABLET, FILM COATED ORAL at 08:18

## 2021-12-21 RX ADMIN — DONEPEZIL HYDROCHLORIDE 5 MG: 5 TABLET ORAL at 21:01

## 2021-12-21 RX ADMIN — VANCOMYCIN HYDROCHLORIDE 125 MG: 125 CAPSULE ORAL at 17:19

## 2021-12-21 RX ADMIN — INSULIN LISPRO 10 UNITS: 100 INJECTION, SOLUTION INTRAVENOUS; SUBCUTANEOUS at 11:29

## 2021-12-22 VITALS
HEIGHT: 73 IN | WEIGHT: 250.66 LBS | HEART RATE: 81 BPM | TEMPERATURE: 97.5 F | BODY MASS INDEX: 33.22 KG/M2 | DIASTOLIC BLOOD PRESSURE: 55 MMHG | SYSTOLIC BLOOD PRESSURE: 123 MMHG | OXYGEN SATURATION: 100 % | RESPIRATION RATE: 16 BRPM

## 2021-12-22 LAB
ANION GAP SERPL CALCULATED.3IONS-SCNC: 11.4 MMOL/L (ref 5–15)
BUN SERPL-MCNC: 73 MG/DL (ref 8–23)
BUN/CREAT SERPL: 37.4 (ref 7–25)
CALCIUM SPEC-SCNC: 9.2 MG/DL (ref 8.6–10.5)
CHLORIDE SERPL-SCNC: 104 MMOL/L (ref 98–107)
CO2 SERPL-SCNC: 22.6 MMOL/L (ref 22–29)
CREAT SERPL-MCNC: 1.95 MG/DL (ref 0.76–1.27)
GFR SERPL CREATININE-BSD FRML MDRD: 33 ML/MIN/1.73
GLUCOSE BLDC GLUCOMTR-MCNC: 165 MG/DL (ref 70–99)
GLUCOSE BLDC GLUCOMTR-MCNC: 219 MG/DL (ref 70–99)
GLUCOSE SERPL-MCNC: 175 MG/DL (ref 65–99)
NT-PROBNP SERPL-MCNC: ABNORMAL PG/ML (ref 0–1800)
POTASSIUM SERPL-SCNC: 4 MMOL/L (ref 3.5–5.2)
SODIUM SERPL-SCNC: 138 MMOL/L (ref 136–145)

## 2021-12-22 PROCEDURE — 25010000002 HEPARIN (PORCINE) PER 1000 UNITS: Performed by: INTERNAL MEDICINE

## 2021-12-22 PROCEDURE — 83880 ASSAY OF NATRIURETIC PEPTIDE: CPT | Performed by: INTERNAL MEDICINE

## 2021-12-22 PROCEDURE — 63710000001 INSULIN LISPRO (HUMAN) PER 5 UNITS: Performed by: INTERNAL MEDICINE

## 2021-12-22 PROCEDURE — 82962 GLUCOSE BLOOD TEST: CPT

## 2021-12-22 PROCEDURE — 99239 HOSP IP/OBS DSCHRG MGMT >30: CPT | Performed by: INTERNAL MEDICINE

## 2021-12-22 PROCEDURE — 80048 BASIC METABOLIC PNL TOTAL CA: CPT | Performed by: INTERNAL MEDICINE

## 2021-12-22 RX ORDER — VANCOMYCIN HYDROCHLORIDE 125 MG/1
125 CAPSULE ORAL EVERY 6 HOURS SCHEDULED
Qty: 26 CAPSULE | Refills: 0 | Status: SHIPPED | OUTPATIENT
Start: 2021-12-22 | End: 2021-12-29

## 2021-12-22 RX ORDER — INSULIN GLARGINE 100 [IU]/ML
20 INJECTION, SOLUTION SUBCUTANEOUS NIGHTLY
Qty: 15 ML | Refills: 2
Start: 2021-12-22 | End: 2022-03-26

## 2021-12-22 RX ORDER — FUROSEMIDE 20 MG/1
20 TABLET ORAL DAILY
Start: 2021-12-22 | End: 2022-03-26

## 2021-12-22 RX ADMIN — NITROGLYCERIN 0.5 INCH: 20 OINTMENT TOPICAL at 08:08

## 2021-12-22 RX ADMIN — MICONAZOLE NITRATE: 2 POWDER TOPICAL at 08:13

## 2021-12-22 RX ADMIN — INSULIN LISPRO 5 UNITS: 100 INJECTION, SOLUTION INTRAVENOUS; SUBCUTANEOUS at 13:39

## 2021-12-22 RX ADMIN — VANCOMYCIN HYDROCHLORIDE 125 MG: 125 CAPSULE ORAL at 00:49

## 2021-12-22 RX ADMIN — INSULIN LISPRO 3 UNITS: 100 INJECTION, SOLUTION INTRAVENOUS; SUBCUTANEOUS at 08:08

## 2021-12-22 RX ADMIN — ESCITALOPRAM OXALATE 5 MG: 10 TABLET ORAL at 08:09

## 2021-12-22 RX ADMIN — NITROGLYCERIN 0.5 INCH: 20 OINTMENT TOPICAL at 13:39

## 2021-12-22 RX ADMIN — POTASSIUM CHLORIDE 10 MEQ: 750 CAPSULE, EXTENDED RELEASE ORAL at 08:09

## 2021-12-22 RX ADMIN — HEPARIN SODIUM 5000 UNITS: 5000 INJECTION, SOLUTION INTRAVENOUS; SUBCUTANEOUS at 08:08

## 2021-12-22 RX ADMIN — GABAPENTIN 100 MG: 100 CAPSULE ORAL at 08:09

## 2021-12-22 RX ADMIN — METOPROLOL TARTRATE 12.5 MG: 25 TABLET, FILM COATED ORAL at 08:09

## 2021-12-22 RX ADMIN — ASPIRIN 81 MG: 81 TABLET, COATED ORAL at 08:09

## 2021-12-22 RX ADMIN — TORSEMIDE 40 MG: 20 TABLET ORAL at 08:09

## 2021-12-22 RX ADMIN — VANCOMYCIN HYDROCHLORIDE 125 MG: 125 CAPSULE ORAL at 13:39

## 2022-01-03 NOTE — PROGRESS NOTES
Enter Query Response Below      Query Response: Type 2 MI due to Afib              If applicable, please update the problem list.        Patient: Betty Givens        : 1935  Account: [id number]           Admit Date: 2021        Options to Respond to Query:    1. Access the Encounter     a. From the To-Do Side bar, click Respond With Note.     b. Click New Note     c. Answer query within the yellow box.                d. Update the Problem List if applicable.     Dr. Cuellar,     85 y/o noted with  Acute Kidney Injury on Chronic Kidney Disease stage 3/4, Anemia, Acute/Chronic Combined Heart Failure, and possible Atrial Fibrillation. The discharge summary noted possible subacute MI, patient troponin came back significantly elevated, but he is not having any chest pain per se, does have a new onset A. fib and did have RVR with this, could also be related to BERT with creatinine in the 6 ballpark. Troponin: 3.320 and 2.550. Patient treated with monitoring, IV fluids, Heparin, Nitrostat, and Metoprolol.     After study, can the possible subacute Myocardial Infarction be specified as:     >>Type 2 MI due to Afib   >>Elevated troponin only   >>Other (please specify):_____________  >>Unable to determine      By submitting this query, we are merely seeking further clarification of documentation to accurately reflect all conditions that you are monitoring, evaluating, treating or that extend the hospitalization or utilize additional resources of care. Please utilize your independent clinical judgment when addressing the question(s) above.     This query and your response, once completed, will be entered into the legal medical record.    Sincerely,  Nona Lorenz RN CCDS   Clinical Documentation Integrity Program   [email or web address]

## 2022-03-17 ENCOUNTER — APPOINTMENT (OUTPATIENT)
Dept: GENERAL RADIOLOGY | Facility: HOSPITAL | Age: 87
End: 2022-03-17

## 2022-03-17 ENCOUNTER — APPOINTMENT (OUTPATIENT)
Dept: CT IMAGING | Facility: HOSPITAL | Age: 87
End: 2022-03-17

## 2022-03-17 ENCOUNTER — HOSPITAL ENCOUNTER (EMERGENCY)
Facility: HOSPITAL | Age: 87
Discharge: HOME | End: 2022-03-17
Attending: EMERGENCY MEDICINE | Admitting: EMERGENCY MEDICINE

## 2022-03-17 VITALS
HEART RATE: 122 BPM | SYSTOLIC BLOOD PRESSURE: 129 MMHG | TEMPERATURE: 97.6 F | RESPIRATION RATE: 21 BRPM | DIASTOLIC BLOOD PRESSURE: 66 MMHG | OXYGEN SATURATION: 99 %

## 2022-03-17 DIAGNOSIS — N30.01 ACUTE CYSTITIS WITH HEMATURIA: Primary | ICD-10-CM

## 2022-03-17 LAB
ALBUMIN SERPL-MCNC: 2.9 G/DL (ref 3.5–5.2)
ALBUMIN/GLOB SERPL: 0.8 G/DL
ALP SERPL-CCNC: 90 U/L (ref 39–117)
ALT SERPL W P-5'-P-CCNC: 5 U/L (ref 1–41)
AMMONIA BLD-SCNC: 19 UMOL/L (ref 16–60)
ANION GAP SERPL CALCULATED.3IONS-SCNC: 12.9 MMOL/L (ref 5–15)
AST SERPL-CCNC: 18 U/L (ref 1–40)
BACTERIA UR QL AUTO: ABNORMAL /HPF
BASOPHILS # BLD AUTO: 0.05 10*3/MM3 (ref 0–0.2)
BASOPHILS NFR BLD AUTO: 1.4 % (ref 0–1.5)
BILIRUB SERPL-MCNC: 0.4 MG/DL (ref 0–1.2)
BILIRUB UR QL STRIP: NEGATIVE
BUN SERPL-MCNC: 89 MG/DL (ref 8–23)
BUN/CREAT SERPL: 33.3 (ref 7–25)
CALCIUM SPEC-SCNC: 8.6 MG/DL (ref 8.6–10.5)
CHLORIDE SERPL-SCNC: 101 MMOL/L (ref 98–107)
CLARITY UR: ABNORMAL
CO2 SERPL-SCNC: 23.1 MMOL/L (ref 22–29)
COLOR UR: ABNORMAL
CREAT SERPL-MCNC: 2.67 MG/DL (ref 0.76–1.27)
D-LACTATE SERPL-SCNC: 2.1 MMOL/L (ref 0.5–2)
D-LACTATE SERPL-SCNC: 2.3 MMOL/L (ref 0.5–2)
DEPRECATED RDW RBC AUTO: 65.7 FL (ref 37–54)
EGFRCR SERPLBLD CKD-EPI 2021: 22.4 ML/MIN/1.73
EOSINOPHIL # BLD AUTO: 0.33 10*3/MM3 (ref 0–0.4)
EOSINOPHIL NFR BLD AUTO: 8.9 % (ref 0.3–6.2)
ERYTHROCYTE [DISTWIDTH] IN BLOOD BY AUTOMATED COUNT: 19.2 % (ref 12.3–15.4)
GLOBULIN UR ELPH-MCNC: 3.5 GM/DL
GLUCOSE BLDC GLUCOMTR-MCNC: 42 MG/DL (ref 70–99)
GLUCOSE BLDC GLUCOMTR-MCNC: 47 MG/DL (ref 70–99)
GLUCOSE BLDC GLUCOMTR-MCNC: 73 MG/DL (ref 70–99)
GLUCOSE SERPL-MCNC: 57 MG/DL (ref 65–99)
GLUCOSE UR STRIP-MCNC: NEGATIVE MG/DL
HCT VFR BLD AUTO: 36.1 % (ref 37.5–51)
HGB BLD-MCNC: 10.6 G/DL (ref 13–17.7)
HGB UR QL STRIP.AUTO: NEGATIVE
HOLD SPECIMEN: NORMAL
HOLD SPECIMEN: NORMAL
HYALINE CASTS UR QL AUTO: ABNORMAL /LPF
IMM GRANULOCYTES # BLD AUTO: 0.01 10*3/MM3 (ref 0–0.05)
IMM GRANULOCYTES NFR BLD AUTO: 0.3 % (ref 0–0.5)
KETONES UR QL STRIP: NEGATIVE
LEUKOCYTE ESTERASE UR QL STRIP.AUTO: ABNORMAL
LYMPHOCYTES # BLD AUTO: 0.77 10*3/MM3 (ref 0.7–3.1)
LYMPHOCYTES NFR BLD AUTO: 20.8 % (ref 19.6–45.3)
MAGNESIUM SERPL-MCNC: 2.1 MG/DL (ref 1.6–2.4)
MCH RBC QN AUTO: 27.7 PG (ref 26.6–33)
MCHC RBC AUTO-ENTMCNC: 29.4 G/DL (ref 31.5–35.7)
MCV RBC AUTO: 94.5 FL (ref 79–97)
MONOCYTES # BLD AUTO: 0.55 10*3/MM3 (ref 0.1–0.9)
MONOCYTES NFR BLD AUTO: 14.9 % (ref 5–12)
NEUTROPHILS NFR BLD AUTO: 1.99 10*3/MM3 (ref 1.7–7)
NEUTROPHILS NFR BLD AUTO: 53.7 % (ref 42.7–76)
NITRITE UR QL STRIP: NEGATIVE
NRBC BLD AUTO-RTO: 0 /100 WBC (ref 0–0.2)
PH UR STRIP.AUTO: <=5 [PH] (ref 5–8)
PLATELET # BLD AUTO: 206 10*3/MM3 (ref 140–450)
PMV BLD AUTO: 9.4 FL (ref 6–12)
POTASSIUM SERPL-SCNC: 4.2 MMOL/L (ref 3.5–5.2)
PROT SERPL-MCNC: 6.4 G/DL (ref 6–8.5)
PROT UR QL STRIP: ABNORMAL
QT INTERVAL: 420 MS
RBC # BLD AUTO: 3.82 10*6/MM3 (ref 4.14–5.8)
RBC # UR STRIP: ABNORMAL /HPF
REF LAB TEST METHOD: ABNORMAL
SODIUM SERPL-SCNC: 137 MMOL/L (ref 136–145)
SP GR UR STRIP: 1.02 (ref 1–1.03)
SQUAMOUS #/AREA URNS HPF: ABNORMAL /HPF
TROPONIN T SERPL-MCNC: 0.06 NG/ML (ref 0–0.03)
UROBILINOGEN UR QL STRIP: ABNORMAL
WBC # UR STRIP: ABNORMAL /HPF
WBC NRBC COR # BLD: 3.7 10*3/MM3 (ref 3.4–10.8)
WHOLE BLOOD HOLD SPECIMEN: NORMAL
WHOLE BLOOD HOLD SPECIMEN: NORMAL

## 2022-03-17 PROCEDURE — 70450 CT HEAD/BRAIN W/O DYE: CPT

## 2022-03-17 PROCEDURE — 87186 SC STD MICRODIL/AGAR DIL: CPT | Performed by: EMERGENCY MEDICINE

## 2022-03-17 PROCEDURE — 36415 COLL VENOUS BLD VENIPUNCTURE: CPT | Performed by: EMERGENCY MEDICINE

## 2022-03-17 PROCEDURE — 25010000002 CEFTRIAXONE PER 250 MG: Performed by: EMERGENCY MEDICINE

## 2022-03-17 PROCEDURE — P9612 CATHETERIZE FOR URINE SPEC: HCPCS

## 2022-03-17 PROCEDURE — 83605 ASSAY OF LACTIC ACID: CPT | Performed by: EMERGENCY MEDICINE

## 2022-03-17 PROCEDURE — 87077 CULTURE AEROBIC IDENTIFY: CPT | Performed by: EMERGENCY MEDICINE

## 2022-03-17 PROCEDURE — 82962 GLUCOSE BLOOD TEST: CPT

## 2022-03-17 PROCEDURE — 83735 ASSAY OF MAGNESIUM: CPT | Performed by: EMERGENCY MEDICINE

## 2022-03-17 PROCEDURE — 82140 ASSAY OF AMMONIA: CPT | Performed by: EMERGENCY MEDICINE

## 2022-03-17 PROCEDURE — 71045 X-RAY EXAM CHEST 1 VIEW: CPT

## 2022-03-17 PROCEDURE — 93005 ELECTROCARDIOGRAM TRACING: CPT

## 2022-03-17 PROCEDURE — 80053 COMPREHEN METABOLIC PANEL: CPT | Performed by: EMERGENCY MEDICINE

## 2022-03-17 PROCEDURE — 96365 THER/PROPH/DIAG IV INF INIT: CPT

## 2022-03-17 PROCEDURE — 85025 COMPLETE CBC W/AUTO DIFF WBC: CPT | Performed by: EMERGENCY MEDICINE

## 2022-03-17 PROCEDURE — 96361 HYDRATE IV INFUSION ADD-ON: CPT

## 2022-03-17 PROCEDURE — 84484 ASSAY OF TROPONIN QUANT: CPT | Performed by: EMERGENCY MEDICINE

## 2022-03-17 PROCEDURE — 87040 BLOOD CULTURE FOR BACTERIA: CPT | Performed by: EMERGENCY MEDICINE

## 2022-03-17 PROCEDURE — 93005 ELECTROCARDIOGRAM TRACING: CPT | Performed by: EMERGENCY MEDICINE

## 2022-03-17 PROCEDURE — 99284 EMERGENCY DEPT VISIT MOD MDM: CPT

## 2022-03-17 PROCEDURE — 81001 URINALYSIS AUTO W/SCOPE: CPT | Performed by: EMERGENCY MEDICINE

## 2022-03-17 PROCEDURE — 87086 URINE CULTURE/COLONY COUNT: CPT | Performed by: EMERGENCY MEDICINE

## 2022-03-17 RX ORDER — CEPHALEXIN 500 MG/1
500 CAPSULE ORAL 2 TIMES DAILY
Qty: 14 CAPSULE | Refills: 0 | Status: SHIPPED | OUTPATIENT
Start: 2022-03-17 | End: 2022-03-26

## 2022-03-17 RX ORDER — CEFTRIAXONE SODIUM 1 G/50ML
1 INJECTION, SOLUTION INTRAVENOUS ONCE
Status: COMPLETED | OUTPATIENT
Start: 2022-03-17 | End: 2022-03-17

## 2022-03-17 RX ORDER — DEXTROSE MONOHYDRATE 100 MG/ML
25 INJECTION, SOLUTION INTRAVENOUS ONCE
Status: COMPLETED | OUTPATIENT
Start: 2022-03-17 | End: 2022-03-17

## 2022-03-17 RX ORDER — SODIUM CHLORIDE 0.9 % (FLUSH) 0.9 %
10 SYRINGE (ML) INJECTION AS NEEDED
Status: DISCONTINUED | OUTPATIENT
Start: 2022-03-17 | End: 2022-03-17 | Stop reason: HOSPADM

## 2022-03-17 RX ORDER — DEXTROSE MONOHYDRATE 100 MG/ML
125 INJECTION, SOLUTION INTRAVENOUS CONTINUOUS
Status: ACTIVE | OUTPATIENT
Start: 2022-03-17 | End: 2022-03-17

## 2022-03-17 RX ORDER — DEXTROSE MONOHYDRATE 25 G/50ML
25 INJECTION, SOLUTION INTRAVENOUS ONCE
Status: DISCONTINUED | OUTPATIENT
Start: 2022-03-17 | End: 2022-03-17 | Stop reason: SDUPTHER

## 2022-03-17 RX ADMIN — SODIUM CHLORIDE 500 ML: 9 INJECTION, SOLUTION INTRAVENOUS at 19:15

## 2022-03-17 RX ADMIN — DEXTROSE MONOHYDRATE 125 ML/HR: 100 INJECTION, SOLUTION INTRAVENOUS at 17:39

## 2022-03-17 RX ADMIN — CEFTRIAXONE SODIUM 1 G: 1 INJECTION, SOLUTION INTRAVENOUS at 19:15

## 2022-03-17 RX ADMIN — DEXTROSE MONOHYDRATE 25 G: 100 INJECTION, SOLUTION INTRAVENOUS at 15:12

## 2022-03-19 LAB — BACTERIA SPEC AEROBE CULT: ABNORMAL

## 2022-03-20 ENCOUNTER — APPOINTMENT (OUTPATIENT)
Dept: GENERAL RADIOLOGY | Facility: HOSPITAL | Age: 87
End: 2022-03-20

## 2022-03-20 ENCOUNTER — HOSPITAL ENCOUNTER (INPATIENT)
Facility: HOSPITAL | Age: 87
LOS: 6 days | Discharge: NURSING, REHAB OR LONG-TERM CARE | End: 2022-03-26
Attending: EMERGENCY MEDICINE | Admitting: INTERNAL MEDICINE

## 2022-03-20 ENCOUNTER — APPOINTMENT (OUTPATIENT)
Dept: CT IMAGING | Facility: HOSPITAL | Age: 87
End: 2022-03-20

## 2022-03-20 DIAGNOSIS — Z51.5 END OF LIFE CARE: ICD-10-CM

## 2022-03-20 DIAGNOSIS — R26.2 DIFFICULTY WALKING: ICD-10-CM

## 2022-03-20 DIAGNOSIS — Z16.12 ESBL (EXTENDED SPECTRUM BETA-LACTAMASE) PRODUCING BACTERIA INFECTION: ICD-10-CM

## 2022-03-20 DIAGNOSIS — N18.9 CHRONIC RENAL FAILURE, UNSPECIFIED CKD STAGE: ICD-10-CM

## 2022-03-20 DIAGNOSIS — G93.41 ACUTE METABOLIC ENCEPHALOPATHY: Primary | ICD-10-CM

## 2022-03-20 DIAGNOSIS — E53.8 B12 DEFICIENCY: ICD-10-CM

## 2022-03-20 DIAGNOSIS — A49.9 ESBL (EXTENDED SPECTRUM BETA-LACTAMASE) PRODUCING BACTERIA INFECTION: ICD-10-CM

## 2022-03-20 DIAGNOSIS — Z78.9 DECREASED ACTIVITIES OF DAILY LIVING (ADL): ICD-10-CM

## 2022-03-20 DIAGNOSIS — R13.12 OROPHARYNGEAL DYSPHAGIA: ICD-10-CM

## 2022-03-20 PROBLEM — I50.32 CHRONIC DIASTOLIC HEART FAILURE: Status: ACTIVE | Noted: 2022-03-20

## 2022-03-20 LAB
ALBUMIN SERPL-MCNC: 2.8 G/DL (ref 3.5–5.2)
ALBUMIN/GLOB SERPL: 0.8 G/DL
ALP SERPL-CCNC: 96 U/L (ref 39–117)
ALT SERPL W P-5'-P-CCNC: <5 U/L (ref 1–41)
AMMONIA BLD-SCNC: 20 UMOL/L (ref 16–60)
ANION GAP SERPL CALCULATED.3IONS-SCNC: 15 MMOL/L (ref 5–15)
AST SERPL-CCNC: 12 U/L (ref 1–40)
BACTERIA UR QL AUTO: ABNORMAL /HPF
BASOPHILS # BLD AUTO: 0.05 10*3/MM3 (ref 0–0.2)
BASOPHILS NFR BLD AUTO: 1 % (ref 0–1.5)
BILIRUB SERPL-MCNC: 0.3 MG/DL (ref 0–1.2)
BILIRUB UR QL STRIP: NEGATIVE
BUN SERPL-MCNC: 100 MG/DL (ref 8–23)
BUN/CREAT SERPL: 32.6 (ref 7–25)
CALCIUM SPEC-SCNC: 8.3 MG/DL (ref 8.6–10.5)
CHLORIDE SERPL-SCNC: 99 MMOL/L (ref 98–107)
CLARITY UR: ABNORMAL
CO2 SERPL-SCNC: 24 MMOL/L (ref 22–29)
COLOR UR: ABNORMAL
CREAT SERPL-MCNC: 3.07 MG/DL (ref 0.76–1.27)
D-LACTATE SERPL-SCNC: 1.5 MMOL/L (ref 0.5–2)
D-LACTATE SERPL-SCNC: 2.5 MMOL/L (ref 0.5–2)
DEPRECATED RDW RBC AUTO: 66.2 FL (ref 37–54)
EGFRCR SERPLBLD CKD-EPI 2021: 19 ML/MIN/1.73
EOSINOPHIL # BLD AUTO: 0.33 10*3/MM3 (ref 0–0.4)
EOSINOPHIL NFR BLD AUTO: 6.7 % (ref 0.3–6.2)
ERYTHROCYTE [DISTWIDTH] IN BLOOD BY AUTOMATED COUNT: 19.2 % (ref 12.3–15.4)
GLOBULIN UR ELPH-MCNC: 3.3 GM/DL
GLUCOSE BLDC GLUCOMTR-MCNC: 161 MG/DL (ref 70–99)
GLUCOSE SERPL-MCNC: 157 MG/DL (ref 65–99)
GLUCOSE UR STRIP-MCNC: NEGATIVE MG/DL
GRAN CASTS URNS QL MICRO: ABNORMAL /LPF
HCT VFR BLD AUTO: 34.8 % (ref 37.5–51)
HGB BLD-MCNC: 10 G/DL (ref 13–17.7)
HGB UR QL STRIP.AUTO: NEGATIVE
HOLD SPECIMEN: NORMAL
HOLD SPECIMEN: NORMAL
HYALINE CASTS UR QL AUTO: ABNORMAL /LPF
IMM GRANULOCYTES # BLD AUTO: 0.03 10*3/MM3 (ref 0–0.05)
IMM GRANULOCYTES NFR BLD AUTO: 0.6 % (ref 0–0.5)
KETONES UR QL STRIP: NEGATIVE
LEUKOCYTE ESTERASE UR QL STRIP.AUTO: ABNORMAL
LYMPHOCYTES # BLD AUTO: 0.75 10*3/MM3 (ref 0.7–3.1)
LYMPHOCYTES NFR BLD AUTO: 15.2 % (ref 19.6–45.3)
MAGNESIUM SERPL-MCNC: 2 MG/DL (ref 1.6–2.4)
MCH RBC QN AUTO: 27.5 PG (ref 26.6–33)
MCHC RBC AUTO-ENTMCNC: 28.7 G/DL (ref 31.5–35.7)
MCV RBC AUTO: 95.6 FL (ref 79–97)
MONOCYTES # BLD AUTO: 0.72 10*3/MM3 (ref 0.1–0.9)
MONOCYTES NFR BLD AUTO: 14.6 % (ref 5–12)
NEUTROPHILS NFR BLD AUTO: 3.05 10*3/MM3 (ref 1.7–7)
NEUTROPHILS NFR BLD AUTO: 61.9 % (ref 42.7–76)
NITRITE UR QL STRIP: NEGATIVE
NRBC BLD AUTO-RTO: 0 /100 WBC (ref 0–0.2)
PH UR STRIP.AUTO: <=5 [PH] (ref 5–8)
PLATELET # BLD AUTO: 212 10*3/MM3 (ref 140–450)
PMV BLD AUTO: 9.3 FL (ref 6–12)
POTASSIUM SERPL-SCNC: 4.1 MMOL/L (ref 3.5–5.2)
PROT SERPL-MCNC: 6.1 G/DL (ref 6–8.5)
PROT UR QL STRIP: ABNORMAL
QT INTERVAL: 426 MS
RBC # BLD AUTO: 3.64 10*6/MM3 (ref 4.14–5.8)
RBC # UR STRIP: ABNORMAL /HPF
REF LAB TEST METHOD: ABNORMAL
SODIUM SERPL-SCNC: 138 MMOL/L (ref 136–145)
SP GR UR STRIP: 1.02 (ref 1–1.03)
SQUAMOUS #/AREA URNS HPF: ABNORMAL /HPF
TROPONIN T SERPL-MCNC: 0.06 NG/ML (ref 0–0.03)
UROBILINOGEN UR QL STRIP: ABNORMAL
WBC # UR STRIP: ABNORMAL /HPF
WBC NRBC COR # BLD: 4.93 10*3/MM3 (ref 3.4–10.8)
WHOLE BLOOD HOLD SPECIMEN: NORMAL
WHOLE BLOOD HOLD SPECIMEN: NORMAL

## 2022-03-20 PROCEDURE — 71045 X-RAY EXAM CHEST 1 VIEW: CPT

## 2022-03-20 PROCEDURE — 25010000002 CEFTRIAXONE PER 250 MG: Performed by: EMERGENCY MEDICINE

## 2022-03-20 PROCEDURE — 51702 INSERT TEMP BLADDER CATH: CPT

## 2022-03-20 PROCEDURE — 83605 ASSAY OF LACTIC ACID: CPT | Performed by: EMERGENCY MEDICINE

## 2022-03-20 PROCEDURE — 99285 EMERGENCY DEPT VISIT HI MDM: CPT

## 2022-03-20 PROCEDURE — 84484 ASSAY OF TROPONIN QUANT: CPT | Performed by: EMERGENCY MEDICINE

## 2022-03-20 PROCEDURE — 87040 BLOOD CULTURE FOR BACTERIA: CPT | Performed by: EMERGENCY MEDICINE

## 2022-03-20 PROCEDURE — 82962 GLUCOSE BLOOD TEST: CPT

## 2022-03-20 PROCEDURE — 87086 URINE CULTURE/COLONY COUNT: CPT | Performed by: EMERGENCY MEDICINE

## 2022-03-20 PROCEDURE — 82140 ASSAY OF AMMONIA: CPT | Performed by: EMERGENCY MEDICINE

## 2022-03-20 PROCEDURE — 84145 PROCALCITONIN (PCT): CPT | Performed by: FAMILY MEDICINE

## 2022-03-20 PROCEDURE — 85025 COMPLETE CBC W/AUTO DIFF WBC: CPT | Performed by: EMERGENCY MEDICINE

## 2022-03-20 PROCEDURE — 87147 CULTURE TYPE IMMUNOLOGIC: CPT | Performed by: EMERGENCY MEDICINE

## 2022-03-20 PROCEDURE — 80053 COMPREHEN METABOLIC PANEL: CPT | Performed by: EMERGENCY MEDICINE

## 2022-03-20 PROCEDURE — 70450 CT HEAD/BRAIN W/O DYE: CPT

## 2022-03-20 PROCEDURE — 93005 ELECTROCARDIOGRAM TRACING: CPT | Performed by: EMERGENCY MEDICINE

## 2022-03-20 PROCEDURE — 83735 ASSAY OF MAGNESIUM: CPT | Performed by: EMERGENCY MEDICINE

## 2022-03-20 PROCEDURE — 81001 URINALYSIS AUTO W/SCOPE: CPT | Performed by: EMERGENCY MEDICINE

## 2022-03-20 RX ORDER — GABAPENTIN 300 MG/1
300 CAPSULE ORAL 3 TIMES DAILY
COMMUNITY
End: 2022-03-26

## 2022-03-20 RX ORDER — CEFTRIAXONE SODIUM 1 G/50ML
1 INJECTION, SOLUTION INTRAVENOUS ONCE
Status: COMPLETED | OUTPATIENT
Start: 2022-03-20 | End: 2022-03-20

## 2022-03-20 RX ORDER — BISACODYL 5 MG/1
5 TABLET, DELAYED RELEASE ORAL DAILY PRN
Status: DISCONTINUED | OUTPATIENT
Start: 2022-03-20 | End: 2022-03-22

## 2022-03-20 RX ORDER — CEFTRIAXONE SODIUM 1 G/50ML
1 INJECTION, SOLUTION INTRAVENOUS ONCE
Status: DISCONTINUED | OUTPATIENT
Start: 2022-03-20 | End: 2022-03-20 | Stop reason: DRUGHIGH

## 2022-03-20 RX ORDER — SODIUM POLYSTYRENE SULFONATE 15 G/60ML
15 SUSPENSION ORAL; RECTAL 4 TIMES DAILY
COMMUNITY
End: 2022-03-26

## 2022-03-20 RX ORDER — ASPIRIN 81 MG/1
81 TABLET ORAL DAILY
Status: DISCONTINUED | OUTPATIENT
Start: 2022-03-21 | End: 2022-03-26 | Stop reason: HOSPADM

## 2022-03-20 RX ORDER — NICOTINE POLACRILEX 4 MG
15 LOZENGE BUCCAL
Status: DISCONTINUED | OUTPATIENT
Start: 2022-03-20 | End: 2022-03-26 | Stop reason: HOSPADM

## 2022-03-20 RX ORDER — SODIUM CHLORIDE 0.9 % (FLUSH) 0.9 %
10 SYRINGE (ML) INJECTION AS NEEDED
Status: DISCONTINUED | OUTPATIENT
Start: 2022-03-20 | End: 2022-03-26 | Stop reason: HOSPADM

## 2022-03-20 RX ORDER — SODIUM CHLORIDE, SODIUM LACTATE, POTASSIUM CHLORIDE, CALCIUM CHLORIDE 600; 310; 30; 20 MG/100ML; MG/100ML; MG/100ML; MG/100ML
75 INJECTION, SOLUTION INTRAVENOUS CONTINUOUS
Status: ACTIVE | OUTPATIENT
Start: 2022-03-21 | End: 2022-03-21

## 2022-03-20 RX ORDER — SODIUM CHLORIDE 0.9 % (FLUSH) 0.9 %
10 SYRINGE (ML) INJECTION EVERY 12 HOURS SCHEDULED
Status: DISCONTINUED | OUTPATIENT
Start: 2022-03-21 | End: 2022-03-26 | Stop reason: HOSPADM

## 2022-03-20 RX ORDER — HEPARIN SODIUM 5000 [USP'U]/ML
5000 INJECTION, SOLUTION INTRAVENOUS; SUBCUTANEOUS EVERY 8 HOURS SCHEDULED
Status: DISCONTINUED | OUTPATIENT
Start: 2022-03-21 | End: 2022-03-26 | Stop reason: HOSPADM

## 2022-03-20 RX ORDER — POLYETHYLENE GLYCOL 3350 17 G/17G
17 POWDER, FOR SOLUTION ORAL DAILY PRN
Status: DISCONTINUED | OUTPATIENT
Start: 2022-03-20 | End: 2022-03-22

## 2022-03-20 RX ORDER — ACETAMINOPHEN 325 MG/1
650 TABLET ORAL EVERY 4 HOURS PRN
Status: DISCONTINUED | OUTPATIENT
Start: 2022-03-20 | End: 2022-03-26 | Stop reason: HOSPADM

## 2022-03-20 RX ORDER — ONDANSETRON 2 MG/ML
4 INJECTION INTRAMUSCULAR; INTRAVENOUS EVERY 6 HOURS PRN
Status: DISCONTINUED | OUTPATIENT
Start: 2022-03-20 | End: 2022-03-26 | Stop reason: HOSPADM

## 2022-03-20 RX ORDER — ALUMINA, MAGNESIA, AND SIMETHICONE 2400; 2400; 240 MG/30ML; MG/30ML; MG/30ML
15 SUSPENSION ORAL EVERY 6 HOURS PRN
Status: DISCONTINUED | OUTPATIENT
Start: 2022-03-20 | End: 2022-03-26 | Stop reason: HOSPADM

## 2022-03-20 RX ORDER — BISACODYL 10 MG
10 SUPPOSITORY, RECTAL RECTAL DAILY PRN
Status: DISCONTINUED | OUTPATIENT
Start: 2022-03-20 | End: 2022-03-22

## 2022-03-20 RX ORDER — SENNOSIDES 8.6 MG
650 CAPSULE ORAL DAILY
COMMUNITY
End: 2022-03-26

## 2022-03-20 RX ORDER — DEXTROSE MONOHYDRATE 100 MG/ML
25 INJECTION, SOLUTION INTRAVENOUS
Status: DISCONTINUED | OUTPATIENT
Start: 2022-03-20 | End: 2022-03-26 | Stop reason: HOSPADM

## 2022-03-20 RX ORDER — AMOXICILLIN 250 MG
2 CAPSULE ORAL 2 TIMES DAILY
Status: DISCONTINUED | OUTPATIENT
Start: 2022-03-21 | End: 2022-03-22

## 2022-03-20 RX ADMIN — CEFTRIAXONE SODIUM 1 G: 1 INJECTION, SOLUTION INTRAVENOUS at 20:42

## 2022-03-20 RX ADMIN — SODIUM CHLORIDE 1000 ML: 9 INJECTION, SOLUTION INTRAVENOUS at 16:34

## 2022-03-21 LAB
ALBUMIN SERPL-MCNC: 2.6 G/DL (ref 3.5–5.2)
ALP SERPL-CCNC: 97 U/L (ref 39–117)
ALT SERPL W P-5'-P-CCNC: <5 U/L (ref 1–41)
ANION GAP SERPL CALCULATED.3IONS-SCNC: 15.3 MMOL/L (ref 5–15)
AST SERPL-CCNC: 10 U/L (ref 1–40)
BACTERIA SPEC AEROBE CULT: NO GROWTH
BASOPHILS # BLD AUTO: 0.05 10*3/MM3 (ref 0–0.2)
BASOPHILS NFR BLD AUTO: 1.1 % (ref 0–1.5)
BILIRUB CONJ SERPL-MCNC: <0.2 MG/DL (ref 0–0.3)
BILIRUB INDIRECT SERPL-MCNC: ABNORMAL MG/DL
BILIRUB SERPL-MCNC: 0.3 MG/DL (ref 0–1.2)
BUN SERPL-MCNC: 98 MG/DL (ref 8–23)
BUN/CREAT SERPL: 32.5 (ref 7–25)
CALCIUM SPEC-SCNC: 8.1 MG/DL (ref 8.6–10.5)
CHLORIDE SERPL-SCNC: 100 MMOL/L (ref 98–107)
CO2 SERPL-SCNC: 23.7 MMOL/L (ref 22–29)
CREAT SERPL-MCNC: 3.02 MG/DL (ref 0.76–1.27)
DEPRECATED RDW RBC AUTO: 66.1 FL (ref 37–54)
EGFRCR SERPLBLD CKD-EPI 2021: 19.3 ML/MIN/1.73
EOSINOPHIL # BLD AUTO: 0.36 10*3/MM3 (ref 0–0.4)
EOSINOPHIL NFR BLD AUTO: 7.7 % (ref 0.3–6.2)
ERYTHROCYTE [DISTWIDTH] IN BLOOD BY AUTOMATED COUNT: 19.2 % (ref 12.3–15.4)
GLUCOSE BLDC GLUCOMTR-MCNC: 142 MG/DL (ref 70–99)
GLUCOSE SERPL-MCNC: 166 MG/DL (ref 65–99)
HCT VFR BLD AUTO: 34.5 % (ref 37.5–51)
HGB BLD-MCNC: 10.1 G/DL (ref 13–17.7)
IMM GRANULOCYTES # BLD AUTO: 0.03 10*3/MM3 (ref 0–0.05)
IMM GRANULOCYTES NFR BLD AUTO: 0.6 % (ref 0–0.5)
LYMPHOCYTES # BLD AUTO: 0.69 10*3/MM3 (ref 0.7–3.1)
LYMPHOCYTES NFR BLD AUTO: 14.8 % (ref 19.6–45.3)
MAGNESIUM SERPL-MCNC: 2 MG/DL (ref 1.6–2.4)
MCH RBC QN AUTO: 27.7 PG (ref 26.6–33)
MCHC RBC AUTO-ENTMCNC: 29.3 G/DL (ref 31.5–35.7)
MCV RBC AUTO: 94.8 FL (ref 79–97)
MONOCYTES # BLD AUTO: 0.75 10*3/MM3 (ref 0.1–0.9)
MONOCYTES NFR BLD AUTO: 16.1 % (ref 5–12)
NEUTROPHILS NFR BLD AUTO: 2.77 10*3/MM3 (ref 1.7–7)
NEUTROPHILS NFR BLD AUTO: 59.7 % (ref 42.7–76)
NRBC BLD AUTO-RTO: 0 /100 WBC (ref 0–0.2)
NT-PROBNP SERPL-MCNC: ABNORMAL PG/ML (ref 0–1800)
PHOSPHATE SERPL-MCNC: 5.9 MG/DL (ref 2.5–4.5)
PLATELET # BLD AUTO: 194 10*3/MM3 (ref 140–450)
PMV BLD AUTO: 9.2 FL (ref 6–12)
POTASSIUM SERPL-SCNC: 3.8 MMOL/L (ref 3.5–5.2)
PROCALCITONIN SERPL-MCNC: 0.34 NG/ML (ref 0–0.25)
PROT SERPL-MCNC: 6.1 G/DL (ref 6–8.5)
RBC # BLD AUTO: 3.64 10*6/MM3 (ref 4.14–5.8)
SODIUM SERPL-SCNC: 139 MMOL/L (ref 136–145)
WBC NRBC COR # BLD: 4.65 10*3/MM3 (ref 3.4–10.8)

## 2022-03-21 PROCEDURE — 25010000002 HEPARIN (PORCINE) PER 1000 UNITS: Performed by: FAMILY MEDICINE

## 2022-03-21 PROCEDURE — 63710000001 INSULIN LISPRO (HUMAN) PER 5 UNITS: Performed by: FAMILY MEDICINE

## 2022-03-21 PROCEDURE — 25010000002 MEROPENEM PER 100 MG: Performed by: FAMILY MEDICINE

## 2022-03-21 PROCEDURE — 97165 OT EVAL LOW COMPLEX 30 MIN: CPT

## 2022-03-21 PROCEDURE — 94760 N-INVAS EAR/PLS OXIMETRY 1: CPT

## 2022-03-21 PROCEDURE — 85025 COMPLETE CBC W/AUTO DIFF WBC: CPT | Performed by: FAMILY MEDICINE

## 2022-03-21 PROCEDURE — 25010000002 FUROSEMIDE PER 20 MG: Performed by: INTERNAL MEDICINE

## 2022-03-21 PROCEDURE — 99223 1ST HOSP IP/OBS HIGH 75: CPT | Performed by: INTERNAL MEDICINE

## 2022-03-21 PROCEDURE — 97161 PT EVAL LOW COMPLEX 20 MIN: CPT

## 2022-03-21 PROCEDURE — 80076 HEPATIC FUNCTION PANEL: CPT | Performed by: FAMILY MEDICINE

## 2022-03-21 PROCEDURE — 80048 BASIC METABOLIC PNL TOTAL CA: CPT | Performed by: FAMILY MEDICINE

## 2022-03-21 PROCEDURE — 82962 GLUCOSE BLOOD TEST: CPT

## 2022-03-21 PROCEDURE — 83880 ASSAY OF NATRIURETIC PEPTIDE: CPT | Performed by: INTERNAL MEDICINE

## 2022-03-21 PROCEDURE — 94799 UNLISTED PULMONARY SVC/PX: CPT

## 2022-03-21 PROCEDURE — 83735 ASSAY OF MAGNESIUM: CPT | Performed by: FAMILY MEDICINE

## 2022-03-21 PROCEDURE — 92610 EVALUATE SWALLOWING FUNCTION: CPT

## 2022-03-21 PROCEDURE — 84100 ASSAY OF PHOSPHORUS: CPT | Performed by: FAMILY MEDICINE

## 2022-03-21 RX ORDER — LOPERAMIDE HYDROCHLORIDE 2 MG/1
2 CAPSULE ORAL 4 TIMES DAILY PRN
COMMUNITY
End: 2022-03-26

## 2022-03-21 RX ORDER — FAMOTIDINE 10 MG/ML
20 INJECTION, SOLUTION INTRAVENOUS NIGHTLY
Status: DISCONTINUED | OUTPATIENT
Start: 2022-03-21 | End: 2022-03-26 | Stop reason: HOSPADM

## 2022-03-21 RX ORDER — AMINO ACIDS/PROTEIN HYDROLYS 15G-100/30
30 LIQUID (ML) ORAL 4 TIMES DAILY
COMMUNITY
End: 2022-03-26 | Stop reason: HOSPADM

## 2022-03-21 RX ORDER — COLLAGENASE SANTYL 250 [ARB'U]/G
1 OINTMENT TOPICAL DAILY
COMMUNITY
End: 2022-03-26

## 2022-03-21 RX ORDER — GINSENG 100 MG
1 CAPSULE ORAL 2 TIMES DAILY
COMMUNITY
End: 2022-03-26

## 2022-03-21 RX ORDER — NYSTATIN 100000 [USP'U]/G
1 POWDER TOPICAL 2 TIMES DAILY
COMMUNITY
End: 2022-03-26

## 2022-03-21 RX ORDER — FUROSEMIDE 10 MG/ML
80 INJECTION INTRAMUSCULAR; INTRAVENOUS EVERY 8 HOURS
Status: DISCONTINUED | OUTPATIENT
Start: 2022-03-21 | End: 2022-03-23

## 2022-03-21 RX ADMIN — MEROPENEM 500 MG: 500 INJECTION INTRAVENOUS at 21:16

## 2022-03-21 RX ADMIN — FUROSEMIDE 80 MG: 10 INJECTION, SOLUTION INTRAMUSCULAR; INTRAVENOUS at 18:20

## 2022-03-21 RX ADMIN — MEROPENEM 500 MG: 500 INJECTION INTRAVENOUS at 06:08

## 2022-03-21 RX ADMIN — Medication 10 ML: at 21:17

## 2022-03-21 RX ADMIN — HEPARIN SODIUM 5000 UNITS: 5000 INJECTION, SOLUTION INTRAVENOUS; SUBCUTANEOUS at 13:58

## 2022-03-21 RX ADMIN — Medication 10 ML: at 09:07

## 2022-03-21 RX ADMIN — FAMOTIDINE 20 MG: 10 INJECTION INTRAVENOUS at 21:31

## 2022-03-21 RX ADMIN — INSULIN LISPRO 3 UNITS: 100 INJECTION, SOLUTION INTRAVENOUS; SUBCUTANEOUS at 11:47

## 2022-03-21 RX ADMIN — MEROPENEM 500 MG: 500 INJECTION INTRAVENOUS at 00:21

## 2022-03-21 RX ADMIN — SODIUM CHLORIDE, POTASSIUM CHLORIDE, SODIUM LACTATE AND CALCIUM CHLORIDE 500 ML: 600; 310; 30; 20 INJECTION, SOLUTION INTRAVENOUS at 00:36

## 2022-03-21 RX ADMIN — FUROSEMIDE 80 MG: 10 INJECTION, SOLUTION INTRAMUSCULAR; INTRAVENOUS at 11:47

## 2022-03-21 RX ADMIN — HEPARIN SODIUM 5000 UNITS: 5000 INJECTION, SOLUTION INTRAVENOUS; SUBCUTANEOUS at 00:32

## 2022-03-21 RX ADMIN — WHITE PETROLATUM 1 APPLICATION: 1.75 OINTMENT TOPICAL at 17:18

## 2022-03-21 RX ADMIN — DAKIN'S SOLUTION 0.125% (QUARTER STRENGTH): 0.12 SOLUTION at 21:17

## 2022-03-21 RX ADMIN — SODIUM CHLORIDE, POTASSIUM CHLORIDE, SODIUM LACTATE AND CALCIUM CHLORIDE 75 ML/HR: 600; 310; 30; 20 INJECTION, SOLUTION INTRAVENOUS at 00:30

## 2022-03-21 RX ADMIN — HEPARIN SODIUM 5000 UNITS: 5000 INJECTION, SOLUTION INTRAVENOUS; SUBCUTANEOUS at 06:09

## 2022-03-21 RX ADMIN — HEPARIN SODIUM 5000 UNITS: 5000 INJECTION, SOLUTION INTRAVENOUS; SUBCUTANEOUS at 21:16

## 2022-03-21 RX ADMIN — DAKIN'S SOLUTION 0.125% (QUARTER STRENGTH): 0.12 SOLUTION at 11:06

## 2022-03-21 RX ADMIN — Medication 10 ML: at 00:32

## 2022-03-22 LAB
027 TOXIN: ABNORMAL
ANION GAP SERPL CALCULATED.3IONS-SCNC: 15.7 MMOL/L (ref 5–15)
BACTERIA SPEC AEROBE CULT: NORMAL
BACTERIA SPEC AEROBE CULT: NORMAL
BUN SERPL-MCNC: 95 MG/DL (ref 8–23)
BUN/CREAT SERPL: 33.2 (ref 7–25)
C DIFF TOX GENS STL QL NAA+PROBE: POSITIVE
CALCIUM SPEC-SCNC: 8.3 MG/DL (ref 8.6–10.5)
CHLORIDE SERPL-SCNC: 101 MMOL/L (ref 98–107)
CO2 SERPL-SCNC: 23.3 MMOL/L (ref 22–29)
CREAT SERPL-MCNC: 2.86 MG/DL (ref 0.76–1.27)
EGFRCR SERPLBLD CKD-EPI 2021: 20.6 ML/MIN/1.73
GLUCOSE BLDC GLUCOMTR-MCNC: 139 MG/DL (ref 70–99)
GLUCOSE BLDC GLUCOMTR-MCNC: 212 MG/DL (ref 70–99)
GLUCOSE BLDC GLUCOMTR-MCNC: 228 MG/DL (ref 70–99)
GLUCOSE SERPL-MCNC: 127 MG/DL (ref 65–99)
POTASSIUM SERPL-SCNC: 3.7 MMOL/L (ref 3.5–5.2)
SODIUM SERPL-SCNC: 140 MMOL/L (ref 136–145)

## 2022-03-22 PROCEDURE — 94799 UNLISTED PULMONARY SVC/PX: CPT

## 2022-03-22 PROCEDURE — 63710000001 INSULIN LISPRO (HUMAN) PER 5 UNITS: Performed by: FAMILY MEDICINE

## 2022-03-22 PROCEDURE — 80048 BASIC METABOLIC PNL TOTAL CA: CPT | Performed by: INTERNAL MEDICINE

## 2022-03-22 PROCEDURE — 82962 GLUCOSE BLOOD TEST: CPT

## 2022-03-22 PROCEDURE — 99233 SBSQ HOSP IP/OBS HIGH 50: CPT | Performed by: INTERNAL MEDICINE

## 2022-03-22 PROCEDURE — 25010000002 FUROSEMIDE PER 20 MG: Performed by: INTERNAL MEDICINE

## 2022-03-22 PROCEDURE — 92526 ORAL FUNCTION THERAPY: CPT

## 2022-03-22 PROCEDURE — 25010000002 HEPARIN (PORCINE) PER 1000 UNITS: Performed by: FAMILY MEDICINE

## 2022-03-22 PROCEDURE — 94760 N-INVAS EAR/PLS OXIMETRY 1: CPT

## 2022-03-22 PROCEDURE — 25010000002 MEROPENEM PER 100 MG: Performed by: FAMILY MEDICINE

## 2022-03-22 PROCEDURE — 87493 C DIFF AMPLIFIED PROBE: CPT | Performed by: INTERNAL MEDICINE

## 2022-03-22 RX ORDER — VANCOMYCIN HYDROCHLORIDE 125 MG/1
125 CAPSULE ORAL EVERY 24 HOURS
Status: DISCONTINUED | OUTPATIENT
Start: 2022-04-08 | End: 2022-03-23

## 2022-03-22 RX ORDER — VANCOMYCIN HYDROCHLORIDE 125 MG/1
125 CAPSULE ORAL EVERY 6 HOURS SCHEDULED
Status: DISCONTINUED | OUTPATIENT
Start: 2022-03-22 | End: 2022-03-23

## 2022-03-22 RX ORDER — VANCOMYCIN HYDROCHLORIDE 125 MG/1
125 CAPSULE ORAL EVERY 12 HOURS
Status: DISCONTINUED | OUTPATIENT
Start: 2022-04-01 | End: 2022-03-23

## 2022-03-22 RX ORDER — VANCOMYCIN HYDROCHLORIDE 125 MG/1
125 CAPSULE ORAL EVERY OTHER DAY
Status: DISCONTINUED | OUTPATIENT
Start: 2022-04-15 | End: 2022-03-23

## 2022-03-22 RX ADMIN — WHITE PETROLATUM 1 APPLICATION: 1.75 OINTMENT TOPICAL at 09:15

## 2022-03-22 RX ADMIN — HEPARIN SODIUM 5000 UNITS: 5000 INJECTION, SOLUTION INTRAVENOUS; SUBCUTANEOUS at 13:15

## 2022-03-22 RX ADMIN — MEROPENEM 500 MG: 500 INJECTION INTRAVENOUS at 06:29

## 2022-03-22 RX ADMIN — Medication 10 ML: at 11:05

## 2022-03-22 RX ADMIN — Medication 10 ML: at 21:14

## 2022-03-22 RX ADMIN — FUROSEMIDE 80 MG: 10 INJECTION, SOLUTION INTRAMUSCULAR; INTRAVENOUS at 02:36

## 2022-03-22 RX ADMIN — MEROPENEM 500 MG: 500 INJECTION INTRAVENOUS at 18:00

## 2022-03-22 RX ADMIN — HEPARIN SODIUM 5000 UNITS: 5000 INJECTION, SOLUTION INTRAVENOUS; SUBCUTANEOUS at 06:28

## 2022-03-22 RX ADMIN — VANCOMYCIN HYDROCHLORIDE 125 MG: 125 CAPSULE ORAL at 11:05

## 2022-03-22 RX ADMIN — DAKIN'S SOLUTION 0.125% (QUARTER STRENGTH): 0.12 SOLUTION at 11:04

## 2022-03-22 RX ADMIN — FAMOTIDINE 20 MG: 10 INJECTION INTRAVENOUS at 21:13

## 2022-03-22 RX ADMIN — FUROSEMIDE 80 MG: 10 INJECTION, SOLUTION INTRAMUSCULAR; INTRAVENOUS at 11:05

## 2022-03-22 RX ADMIN — VANCOMYCIN HYDROCHLORIDE 125 MG: 125 CAPSULE ORAL at 17:17

## 2022-03-22 RX ADMIN — VANCOMYCIN HYDROCHLORIDE 125 MG: 125 CAPSULE ORAL at 23:34

## 2022-03-22 RX ADMIN — HEPARIN SODIUM 5000 UNITS: 5000 INJECTION, SOLUTION INTRAVENOUS; SUBCUTANEOUS at 21:13

## 2022-03-22 RX ADMIN — INSULIN LISPRO 3 UNITS: 100 INJECTION, SOLUTION INTRAVENOUS; SUBCUTANEOUS at 17:16

## 2022-03-22 RX ADMIN — FUROSEMIDE 80 MG: 10 INJECTION, SOLUTION INTRAMUSCULAR; INTRAVENOUS at 17:17

## 2022-03-22 RX ADMIN — DAKIN'S SOLUTION 0.125% (QUARTER STRENGTH): 0.12 SOLUTION at 21:27

## 2022-03-23 LAB
ANION GAP SERPL CALCULATED.3IONS-SCNC: 15.4 MMOL/L (ref 5–15)
BACTERIA SPEC AEROBE CULT: ABNORMAL
BUN SERPL-MCNC: 110 MG/DL (ref 8–23)
BUN/CREAT SERPL: 33.6 (ref 7–25)
CALCIUM SPEC-SCNC: 8.8 MG/DL (ref 8.6–10.5)
CHLORIDE SERPL-SCNC: 100 MMOL/L (ref 98–107)
CO2 SERPL-SCNC: 22.6 MMOL/L (ref 22–29)
CREAT SERPL-MCNC: 3.27 MG/DL (ref 0.76–1.27)
EGFRCR SERPLBLD CKD-EPI 2021: 17.6 ML/MIN/1.73
GLUCOSE BLDC GLUCOMTR-MCNC: 104 MG/DL (ref 70–99)
GLUCOSE BLDC GLUCOMTR-MCNC: 119 MG/DL (ref 70–99)
GLUCOSE BLDC GLUCOMTR-MCNC: 152 MG/DL (ref 70–99)
GLUCOSE SERPL-MCNC: 191 MG/DL (ref 65–99)
GRAM STN SPEC: ABNORMAL
ISOLATED FROM: ABNORMAL
POTASSIUM SERPL-SCNC: 3.6 MMOL/L (ref 3.5–5.2)
SODIUM SERPL-SCNC: 138 MMOL/L (ref 136–145)

## 2022-03-23 PROCEDURE — 25010000002 MEROPENEM PER 100 MG: Performed by: FAMILY MEDICINE

## 2022-03-23 PROCEDURE — 25010000002 HEPARIN (PORCINE) PER 1000 UNITS: Performed by: FAMILY MEDICINE

## 2022-03-23 PROCEDURE — 92526 ORAL FUNCTION THERAPY: CPT

## 2022-03-23 PROCEDURE — 82962 GLUCOSE BLOOD TEST: CPT

## 2022-03-23 PROCEDURE — 94799 UNLISTED PULMONARY SVC/PX: CPT

## 2022-03-23 PROCEDURE — 63710000001 INSULIN LISPRO (HUMAN) PER 5 UNITS: Performed by: FAMILY MEDICINE

## 2022-03-23 PROCEDURE — 25010000002 FUROSEMIDE PER 20 MG: Performed by: INTERNAL MEDICINE

## 2022-03-23 PROCEDURE — 99232 SBSQ HOSP IP/OBS MODERATE 35: CPT | Performed by: INTERNAL MEDICINE

## 2022-03-23 PROCEDURE — 80048 BASIC METABOLIC PNL TOTAL CA: CPT | Performed by: INTERNAL MEDICINE

## 2022-03-23 RX ADMIN — HEPARIN SODIUM 5000 UNITS: 5000 INJECTION, SOLUTION INTRAVENOUS; SUBCUTANEOUS at 20:45

## 2022-03-23 RX ADMIN — MEROPENEM 500 MG: 500 INJECTION INTRAVENOUS at 06:00

## 2022-03-23 RX ADMIN — INSULIN LISPRO 2 UNITS: 100 INJECTION, SOLUTION INTRAVENOUS; SUBCUTANEOUS at 08:40

## 2022-03-23 RX ADMIN — VANCOMYCIN HYDROCHLORIDE 125 MG: KIT at 23:54

## 2022-03-23 RX ADMIN — FAMOTIDINE 20 MG: 10 INJECTION INTRAVENOUS at 20:46

## 2022-03-23 RX ADMIN — WHITE PETROLATUM 1 APPLICATION: 1.75 OINTMENT TOPICAL at 08:40

## 2022-03-23 RX ADMIN — FUROSEMIDE 80 MG: 10 INJECTION, SOLUTION INTRAMUSCULAR; INTRAVENOUS at 03:03

## 2022-03-23 RX ADMIN — VANCOMYCIN HYDROCHLORIDE 125 MG: KIT at 18:04

## 2022-03-23 RX ADMIN — Medication 10 ML: at 03:04

## 2022-03-23 RX ADMIN — HEPARIN SODIUM 5000 UNITS: 5000 INJECTION, SOLUTION INTRAVENOUS; SUBCUTANEOUS at 13:17

## 2022-03-23 RX ADMIN — MEROPENEM 500 MG: 500 INJECTION INTRAVENOUS at 18:03

## 2022-03-23 RX ADMIN — Medication 10 ML: at 20:46

## 2022-03-23 RX ADMIN — DAKIN'S SOLUTION 0.125% (QUARTER STRENGTH): 0.12 SOLUTION at 08:40

## 2022-03-23 RX ADMIN — VANCOMYCIN HYDROCHLORIDE 125 MG: KIT at 13:17

## 2022-03-23 RX ADMIN — HEPARIN SODIUM 5000 UNITS: 5000 INJECTION, SOLUTION INTRAVENOUS; SUBCUTANEOUS at 05:17

## 2022-03-23 RX ADMIN — Medication 10 ML: at 08:40

## 2022-03-23 RX ADMIN — VANCOMYCIN HYDROCHLORIDE 125 MG: 125 CAPSULE ORAL at 05:17

## 2022-03-24 LAB
ANION GAP SERPL CALCULATED.3IONS-SCNC: 16.3 MMOL/L (ref 5–15)
BASOPHILS # BLD AUTO: 0.03 10*3/MM3 (ref 0–0.2)
BASOPHILS NFR BLD AUTO: 0.7 % (ref 0–1.5)
BUN SERPL-MCNC: 117 MG/DL (ref 8–23)
BUN/CREAT SERPL: 31.9 (ref 7–25)
CALCIUM SPEC-SCNC: 9 MG/DL (ref 8.6–10.5)
CHLORIDE SERPL-SCNC: 99 MMOL/L (ref 98–107)
CO2 SERPL-SCNC: 20.7 MMOL/L (ref 22–29)
CREAT SERPL-MCNC: 3.67 MG/DL (ref 0.76–1.27)
DEPRECATED RDW RBC AUTO: 65.1 FL (ref 37–54)
EGFRCR SERPLBLD CKD-EPI 2021: 15.3 ML/MIN/1.73
EOSINOPHIL # BLD AUTO: 0.23 10*3/MM3 (ref 0–0.4)
EOSINOPHIL NFR BLD AUTO: 5.1 % (ref 0.3–6.2)
ERYTHROCYTE [DISTWIDTH] IN BLOOD BY AUTOMATED COUNT: 19.2 % (ref 12.3–15.4)
GLUCOSE BLDC GLUCOMTR-MCNC: 140 MG/DL (ref 70–99)
GLUCOSE BLDC GLUCOMTR-MCNC: 156 MG/DL (ref 70–99)
GLUCOSE BLDC GLUCOMTR-MCNC: 182 MG/DL (ref 70–99)
GLUCOSE SERPL-MCNC: 138 MG/DL (ref 65–99)
HCT VFR BLD AUTO: 36.6 % (ref 37.5–51)
HGB BLD-MCNC: 10.6 G/DL (ref 13–17.7)
IMM GRANULOCYTES # BLD AUTO: 0.05 10*3/MM3 (ref 0–0.05)
IMM GRANULOCYTES NFR BLD AUTO: 1.1 % (ref 0–0.5)
LYMPHOCYTES # BLD AUTO: 0.65 10*3/MM3 (ref 0.7–3.1)
LYMPHOCYTES NFR BLD AUTO: 14.3 % (ref 19.6–45.3)
MCH RBC QN AUTO: 27 PG (ref 26.6–33)
MCHC RBC AUTO-ENTMCNC: 29 G/DL (ref 31.5–35.7)
MCV RBC AUTO: 93.4 FL (ref 79–97)
MONOCYTES # BLD AUTO: 0.44 10*3/MM3 (ref 0.1–0.9)
MONOCYTES NFR BLD AUTO: 9.7 % (ref 5–12)
NEUTROPHILS NFR BLD AUTO: 3.15 10*3/MM3 (ref 1.7–7)
NEUTROPHILS NFR BLD AUTO: 69.1 % (ref 42.7–76)
NRBC BLD AUTO-RTO: 0 /100 WBC (ref 0–0.2)
PLATELET # BLD AUTO: 195 10*3/MM3 (ref 140–450)
PMV BLD AUTO: 9.1 FL (ref 6–12)
POTASSIUM SERPL-SCNC: 3.7 MMOL/L (ref 3.5–5.2)
RBC # BLD AUTO: 3.92 10*6/MM3 (ref 4.14–5.8)
SODIUM SERPL-SCNC: 136 MMOL/L (ref 136–145)
WBC NRBC COR # BLD: 4.55 10*3/MM3 (ref 3.4–10.8)

## 2022-03-24 PROCEDURE — 99233 SBSQ HOSP IP/OBS HIGH 50: CPT | Performed by: INTERNAL MEDICINE

## 2022-03-24 PROCEDURE — 94799 UNLISTED PULMONARY SVC/PX: CPT

## 2022-03-24 PROCEDURE — 80048 BASIC METABOLIC PNL TOTAL CA: CPT | Performed by: INTERNAL MEDICINE

## 2022-03-24 PROCEDURE — 25010000002 HEPARIN (PORCINE) PER 1000 UNITS: Performed by: FAMILY MEDICINE

## 2022-03-24 PROCEDURE — 82962 GLUCOSE BLOOD TEST: CPT

## 2022-03-24 PROCEDURE — 25010000002 MEROPENEM PER 100 MG: Performed by: FAMILY MEDICINE

## 2022-03-24 PROCEDURE — 85025 COMPLETE CBC W/AUTO DIFF WBC: CPT | Performed by: INTERNAL MEDICINE

## 2022-03-24 PROCEDURE — 92526 ORAL FUNCTION THERAPY: CPT

## 2022-03-24 RX ADMIN — VANCOMYCIN HYDROCHLORIDE 125 MG: KIT at 05:18

## 2022-03-24 RX ADMIN — Medication 10 ML: at 20:04

## 2022-03-24 RX ADMIN — VANCOMYCIN HYDROCHLORIDE 125 MG: KIT at 11:22

## 2022-03-24 RX ADMIN — DAKIN'S SOLUTION 0.125% (QUARTER STRENGTH): 0.12 SOLUTION at 10:42

## 2022-03-24 RX ADMIN — DAKIN'S SOLUTION 0.125% (QUARTER STRENGTH): 0.12 SOLUTION at 20:04

## 2022-03-24 RX ADMIN — HEPARIN SODIUM 5000 UNITS: 5000 INJECTION, SOLUTION INTRAVENOUS; SUBCUTANEOUS at 20:04

## 2022-03-24 RX ADMIN — MEROPENEM 500 MG: 500 INJECTION INTRAVENOUS at 05:19

## 2022-03-24 RX ADMIN — MEROPENEM 500 MG: 500 INJECTION INTRAVENOUS at 17:17

## 2022-03-24 RX ADMIN — HEPARIN SODIUM 5000 UNITS: 5000 INJECTION, SOLUTION INTRAVENOUS; SUBCUTANEOUS at 13:32

## 2022-03-24 RX ADMIN — Medication 10 ML: at 10:42

## 2022-03-24 RX ADMIN — DAKIN'S SOLUTION 0.125% (QUARTER STRENGTH): 0.12 SOLUTION at 01:45

## 2022-03-24 RX ADMIN — HEPARIN SODIUM 5000 UNITS: 5000 INJECTION, SOLUTION INTRAVENOUS; SUBCUTANEOUS at 05:19

## 2022-03-24 RX ADMIN — WHITE PETROLATUM 1 APPLICATION: 1.75 OINTMENT TOPICAL at 10:42

## 2022-03-24 RX ADMIN — VANCOMYCIN HYDROCHLORIDE 125 MG: KIT at 17:17

## 2022-03-24 RX ADMIN — FAMOTIDINE 20 MG: 10 INJECTION INTRAVENOUS at 20:04

## 2022-03-25 VITALS
TEMPERATURE: 97.2 F | OXYGEN SATURATION: 99 % | RESPIRATION RATE: 20 BRPM | SYSTOLIC BLOOD PRESSURE: 102 MMHG | HEART RATE: 108 BPM | WEIGHT: 253.75 LBS | BODY MASS INDEX: 33.63 KG/M2 | DIASTOLIC BLOOD PRESSURE: 62 MMHG | HEIGHT: 73 IN

## 2022-03-25 LAB
ALBUMIN SERPL-MCNC: 2.6 G/DL (ref 3.5–5.2)
ALBUMIN/GLOB SERPL: 0.9 G/DL
ALP SERPL-CCNC: 85 U/L (ref 39–117)
ALT SERPL W P-5'-P-CCNC: <5 U/L (ref 1–41)
ANION GAP SERPL CALCULATED.3IONS-SCNC: 18.8 MMOL/L (ref 5–15)
AST SERPL-CCNC: 10 U/L (ref 1–40)
BACTERIA SPEC AEROBE CULT: NORMAL
BASOPHILS # BLD AUTO: 0.02 10*3/MM3 (ref 0–0.2)
BASOPHILS NFR BLD AUTO: 0.4 % (ref 0–1.5)
BILIRUB SERPL-MCNC: 0.3 MG/DL (ref 0–1.2)
BUN SERPL-MCNC: 121 MG/DL (ref 8–23)
BUN/CREAT SERPL: 30.9 (ref 7–25)
CALCIUM SPEC-SCNC: 8.7 MG/DL (ref 8.6–10.5)
CHLORIDE SERPL-SCNC: 102 MMOL/L (ref 98–107)
CO2 SERPL-SCNC: 19.2 MMOL/L (ref 22–29)
CREAT SERPL-MCNC: 3.91 MG/DL (ref 0.76–1.27)
D-LACTATE SERPL-SCNC: 1.4 MMOL/L (ref 0.5–2)
DEPRECATED RDW RBC AUTO: 64.8 FL (ref 37–54)
EGFRCR SERPLBLD CKD-EPI 2021: 14.2 ML/MIN/1.73
EOSINOPHIL # BLD AUTO: 0.27 10*3/MM3 (ref 0–0.4)
EOSINOPHIL NFR BLD AUTO: 5.1 % (ref 0.3–6.2)
ERYTHROCYTE [DISTWIDTH] IN BLOOD BY AUTOMATED COUNT: 19.3 % (ref 12.3–15.4)
GLOBULIN UR ELPH-MCNC: 3 GM/DL
GLUCOSE BLDC GLUCOMTR-MCNC: 102 MG/DL (ref 70–99)
GLUCOSE BLDC GLUCOMTR-MCNC: 163 MG/DL (ref 70–99)
GLUCOSE BLDC GLUCOMTR-MCNC: 181 MG/DL (ref 70–99)
GLUCOSE SERPL-MCNC: 164 MG/DL (ref 65–99)
HCT VFR BLD AUTO: 34.2 % (ref 37.5–51)
HGB BLD-MCNC: 10 G/DL (ref 13–17.7)
IMM GRANULOCYTES # BLD AUTO: 0.06 10*3/MM3 (ref 0–0.05)
IMM GRANULOCYTES NFR BLD AUTO: 1.1 % (ref 0–0.5)
LYMPHOCYTES # BLD AUTO: 0.77 10*3/MM3 (ref 0.7–3.1)
LYMPHOCYTES NFR BLD AUTO: 14.5 % (ref 19.6–45.3)
MCH RBC QN AUTO: 27.4 PG (ref 26.6–33)
MCHC RBC AUTO-ENTMCNC: 29.2 G/DL (ref 31.5–35.7)
MCV RBC AUTO: 93.7 FL (ref 79–97)
MONOCYTES # BLD AUTO: 0.43 10*3/MM3 (ref 0.1–0.9)
MONOCYTES NFR BLD AUTO: 8.1 % (ref 5–12)
NEUTROPHILS NFR BLD AUTO: 3.75 10*3/MM3 (ref 1.7–7)
NEUTROPHILS NFR BLD AUTO: 70.8 % (ref 42.7–76)
NRBC BLD AUTO-RTO: 0 /100 WBC (ref 0–0.2)
PLATELET # BLD AUTO: 202 10*3/MM3 (ref 140–450)
PMV BLD AUTO: 9.1 FL (ref 6–12)
POTASSIUM SERPL-SCNC: 4.1 MMOL/L (ref 3.5–5.2)
PROCALCITONIN SERPL-MCNC: 0.26 NG/ML (ref 0–0.25)
PROT SERPL-MCNC: 5.6 G/DL (ref 6–8.5)
RBC # BLD AUTO: 3.65 10*6/MM3 (ref 4.14–5.8)
SODIUM SERPL-SCNC: 140 MMOL/L (ref 136–145)
WBC NRBC COR # BLD: 5.3 10*3/MM3 (ref 3.4–10.8)

## 2022-03-25 PROCEDURE — 25010000002 MEROPENEM PER 100 MG: Performed by: FAMILY MEDICINE

## 2022-03-25 PROCEDURE — 92526 ORAL FUNCTION THERAPY: CPT

## 2022-03-25 PROCEDURE — 63710000001 INSULIN LISPRO (HUMAN) PER 5 UNITS: Performed by: FAMILY MEDICINE

## 2022-03-25 PROCEDURE — 80053 COMPREHEN METABOLIC PANEL: CPT | Performed by: INTERNAL MEDICINE

## 2022-03-25 PROCEDURE — 94799 UNLISTED PULMONARY SVC/PX: CPT

## 2022-03-25 PROCEDURE — 82962 GLUCOSE BLOOD TEST: CPT

## 2022-03-25 PROCEDURE — 99239 HOSP IP/OBS DSCHRG MGMT >30: CPT | Performed by: INTERNAL MEDICINE

## 2022-03-25 PROCEDURE — 25010000002 HEPARIN (PORCINE) PER 1000 UNITS: Performed by: FAMILY MEDICINE

## 2022-03-25 PROCEDURE — 84145 PROCALCITONIN (PCT): CPT | Performed by: INTERNAL MEDICINE

## 2022-03-25 PROCEDURE — 85025 COMPLETE CBC W/AUTO DIFF WBC: CPT | Performed by: INTERNAL MEDICINE

## 2022-03-25 PROCEDURE — 83605 ASSAY OF LACTIC ACID: CPT | Performed by: INTERNAL MEDICINE

## 2022-03-25 RX ORDER — LORAZEPAM 2 MG/ML
0.5 INJECTION INTRAMUSCULAR
Status: DISCONTINUED | OUTPATIENT
Start: 2022-03-25 | End: 2022-03-26 | Stop reason: HOSPADM

## 2022-03-25 RX ORDER — ATROPINE SULFATE 10 MG/ML
2 SOLUTION/ DROPS OPHTHALMIC
Status: DISCONTINUED | OUTPATIENT
Start: 2022-03-25 | End: 2022-03-26 | Stop reason: HOSPADM

## 2022-03-25 RX ORDER — MORPHINE SULFATE 20 MG/ML
5 SOLUTION ORAL
Status: DISCONTINUED | OUTPATIENT
Start: 2022-03-25 | End: 2022-03-26 | Stop reason: HOSPADM

## 2022-03-25 RX ORDER — MORPHINE SULFATE 2 MG/ML
2 INJECTION, SOLUTION INTRAMUSCULAR; INTRAVENOUS
Status: DISCONTINUED | OUTPATIENT
Start: 2022-03-25 | End: 2022-03-26 | Stop reason: HOSPADM

## 2022-03-25 RX ORDER — MORPHINE SULFATE 20 MG/ML
5 SOLUTION ORAL
Qty: 30 ML | Refills: 0 | Status: SHIPPED | OUTPATIENT
Start: 2022-03-25

## 2022-03-25 RX ORDER — LORAZEPAM 2 MG/ML
0.5 CONCENTRATE ORAL
Status: DISCONTINUED | OUTPATIENT
Start: 2022-03-25 | End: 2022-03-26 | Stop reason: HOSPADM

## 2022-03-25 RX ORDER — BUMETANIDE 0.25 MG/ML
2 INJECTION INTRAMUSCULAR; INTRAVENOUS ONCE
Status: COMPLETED | OUTPATIENT
Start: 2022-03-25 | End: 2022-03-25

## 2022-03-25 RX ORDER — ATROPINE SULFATE 10 MG/ML
2 SOLUTION/ DROPS OPHTHALMIC
Qty: 15 ML | Refills: 0 | Status: SHIPPED | OUTPATIENT
Start: 2022-03-25

## 2022-03-25 RX ORDER — LORAZEPAM 2 MG/ML
0.5 CONCENTRATE ORAL
Qty: 30 ML | Refills: 0 | Status: SHIPPED | OUTPATIENT
Start: 2022-03-25 | End: 2022-04-01

## 2022-03-25 RX ADMIN — WHITE PETROLATUM 1 APPLICATION: 1.75 OINTMENT TOPICAL at 09:49

## 2022-03-25 RX ADMIN — HEPARIN SODIUM 5000 UNITS: 5000 INJECTION, SOLUTION INTRAVENOUS; SUBCUTANEOUS at 14:51

## 2022-03-25 RX ADMIN — HEPARIN SODIUM 5000 UNITS: 5000 INJECTION, SOLUTION INTRAVENOUS; SUBCUTANEOUS at 05:00

## 2022-03-25 RX ADMIN — INSULIN LISPRO 2 UNITS: 100 INJECTION, SOLUTION INTRAVENOUS; SUBCUTANEOUS at 11:53

## 2022-03-25 RX ADMIN — VANCOMYCIN HYDROCHLORIDE 125 MG: KIT at 18:51

## 2022-03-25 RX ADMIN — INSULIN LISPRO 2 UNITS: 100 INJECTION, SOLUTION INTRAVENOUS; SUBCUTANEOUS at 09:48

## 2022-03-25 RX ADMIN — VANCOMYCIN HYDROCHLORIDE 125 MG: KIT at 05:00

## 2022-03-25 RX ADMIN — BUMETANIDE 2 MG: 0.25 INJECTION INTRAMUSCULAR; INTRAVENOUS at 09:48

## 2022-03-25 RX ADMIN — ASPIRIN 81 MG: 81 TABLET, COATED ORAL at 09:49

## 2022-03-25 RX ADMIN — MEROPENEM 500 MG: 500 INJECTION INTRAVENOUS at 05:00

## 2022-03-25 RX ADMIN — Medication 10 ML: at 09:50

## 2022-03-25 RX ADMIN — VANCOMYCIN HYDROCHLORIDE 125 MG: KIT at 11:56

## 2022-03-25 RX ADMIN — MEROPENEM 500 MG: 500 INJECTION INTRAVENOUS at 18:51

## 2022-03-25 RX ADMIN — DAKIN'S SOLUTION 0.125% (QUARTER STRENGTH): 0.12 SOLUTION at 09:49

## 2022-03-25 RX ADMIN — VANCOMYCIN HYDROCHLORIDE 125 MG: KIT at 00:28

## 2022-04-04 PROBLEM — T83.511A URINARY TRACT INFECTION ASSOCIATED WITH INDWELLING URETHRAL CATHETER (HCC): Status: ACTIVE | Noted: 2022-04-04

## 2022-04-04 PROBLEM — N39.0 URINARY TRACT INFECTION ASSOCIATED WITH INDWELLING URETHRAL CATHETER (HCC): Status: ACTIVE | Noted: 2022-04-04

## 2022-04-04 NOTE — PROGRESS NOTES
"Enter Query Response Below      Query Response:       Sepsis due to Urinary Tract Infection associated with Shultz Catheter was Present on Admission       If applicable, please update the problem list.       Patient: Betty Givens        : 1935  Account: [id number]           Admit Date: 3/20/2022        Options to Respond to Query:    1. Access the Encounter     a. From the To-Do Side bar, click Respond With Note.     b. Click New Note     c. Answer query within the yellow box.                d. Update the Problem List if applicable.     ,     The patient was admitted 3/20/22 with a catheter associated ESBL E Coli Urinary Tract Infection, Acute kidney injury, Altered Mental Status, Acute on Chronic Combined Heart Failure, and later CDiff, among other diagnoses.  The 3/24/22 Progress Note includes  \"progressive sepsis.\" Vital Signs on 3/20/22 included a Temperature of 97.6 Oral, Heart Rate 19-92, Respirations 16-18.  Vital Signs 3/24/22 noted a Temperature as low as 93.8 Rectal; Heart Rate 53-90, and Respirations 18.  Treatment included a Normal Saline Bolus 3/20 and 3/21, Rocephin 3/20, Merrem 3/21-3/25, a Lactated Ringers Infusion 3/21, and Oral Vancomycin started 3/22.     3/20/22 White Blood Cell Count 4.93; Lactate 2.5; Procalcitonin 0.34  3/24/22 White Blood Cell Count 4.55  43846 Lactate 1.4; Procalcitonin 0.26    After study clarify the meaning of Progressive Sepsis such as:    Sepsis due to Urinary Tract Infection associated with Shultz Catheter was Present on Admission  Sepsis due to Urinary Tract Infection associated with Shultz Catheter developed after admission  Other _____________________________  Unable to determine    By submitting this query, we are merely seeking further clarification of documentation to accurately reflect all conditions that you are monitoring, evaluating, treating or that extend the hospitalization or utilize additional resources of care. Please utilize your " independent clinical judgment when addressing the question(s) above.     This query and your response, once completed, will be entered into the legal medical record.    Sincerely,  Roz PADILLA, RN, CDIS  Clinical Documentation Integrity Program   [email or web address]